# Patient Record
Sex: FEMALE | Race: WHITE | Employment: UNEMPLOYED | URBAN - METROPOLITAN AREA
[De-identification: names, ages, dates, MRNs, and addresses within clinical notes are randomized per-mention and may not be internally consistent; named-entity substitution may affect disease eponyms.]

---

## 2018-02-24 ENCOUNTER — HOSPITAL ENCOUNTER (EMERGENCY)
Age: 63
Discharge: HOME | End: 2018-02-24
Attending: EMERGENCY MEDICINE
Payer: MEDICARE

## 2018-02-24 VITALS
TEMPERATURE: 98.2 F | RESPIRATION RATE: 16 BRPM | WEIGHT: 155.87 LBS | HEART RATE: 66 BPM | HEIGHT: 55 IN | BODY MASS INDEX: 36.07 KG/M2 | DIASTOLIC BLOOD PRESSURE: 67 MMHG | OXYGEN SATURATION: 98 % | SYSTOLIC BLOOD PRESSURE: 128 MMHG

## 2018-02-24 DIAGNOSIS — K64.9 HEMORRHOIDS, UNSPECIFIED HEMORRHOID TYPE: ICD-10-CM

## 2018-02-24 DIAGNOSIS — K62.5 RECTAL BLEEDING: Primary | ICD-10-CM

## 2018-02-24 LAB
ALBUMIN SERPL-MCNC: 3.3 G/DL (ref 3.5–5)
ALBUMIN/GLOB SERPL: 0.7 {RATIO} (ref 1.1–2.2)
ALP SERPL-CCNC: 93 U/L (ref 45–117)
ALT SERPL-CCNC: 25 U/L (ref 12–78)
ANION GAP SERPL CALC-SCNC: 6 MMOL/L (ref 5–15)
APPEARANCE UR: ABNORMAL
AST SERPL-CCNC: 51 U/L (ref 15–37)
BACTERIA URNS QL MICRO: ABNORMAL /HPF
BASOPHILS # BLD: 0 K/UL (ref 0–0.1)
BASOPHILS NFR BLD: 0 % (ref 0–1)
BILIRUB SERPL-MCNC: 0.6 MG/DL (ref 0.2–1)
BILIRUB UR QL: NEGATIVE
BUN SERPL-MCNC: 18 MG/DL (ref 6–20)
BUN/CREAT SERPL: 22 (ref 12–20)
CALCIUM SERPL-MCNC: 8.6 MG/DL (ref 8.5–10.1)
CHLORIDE SERPL-SCNC: 101 MMOL/L (ref 97–108)
CO2 SERPL-SCNC: 28 MMOL/L (ref 21–32)
COLOR UR: ABNORMAL
CREAT SERPL-MCNC: 0.82 MG/DL (ref 0.55–1.02)
DIFFERENTIAL METHOD BLD: ABNORMAL
EOSINOPHIL # BLD: 0 K/UL (ref 0–0.4)
EOSINOPHIL NFR BLD: 0 % (ref 0–7)
EPITH CASTS URNS QL MICRO: ABNORMAL /LPF
ERYTHROCYTE [DISTWIDTH] IN BLOOD BY AUTOMATED COUNT: 14 % (ref 11.5–14.5)
GLOBULIN SER CALC-MCNC: 4.8 G/DL (ref 2–4)
GLUCOSE SERPL-MCNC: 93 MG/DL (ref 65–100)
GLUCOSE UR STRIP.AUTO-MCNC: NEGATIVE MG/DL
HCT VFR BLD AUTO: 51.8 % (ref 35–47)
HGB BLD-MCNC: 17.1 G/DL (ref 11.5–16)
HGB UR QL STRIP: ABNORMAL
IMM GRANULOCYTES # BLD: 0 K/UL (ref 0–0.04)
IMM GRANULOCYTES NFR BLD AUTO: 0 % (ref 0–0.5)
KETONES UR QL STRIP.AUTO: ABNORMAL MG/DL
LEUKOCYTE ESTERASE UR QL STRIP.AUTO: ABNORMAL
LYMPHOCYTES # BLD: 0.4 K/UL (ref 0.8–3.5)
LYMPHOCYTES NFR BLD: 4 % (ref 12–49)
MCH RBC QN AUTO: 31 PG (ref 26–34)
MCHC RBC AUTO-ENTMCNC: 33 G/DL (ref 30–36.5)
MCV RBC AUTO: 94 FL (ref 80–99)
MONOCYTES # BLD: 0.4 K/UL (ref 0–1)
MONOCYTES NFR BLD: 4 % (ref 5–13)
NEUTS SEG # BLD: 9.1 K/UL (ref 1.8–8)
NEUTS SEG NFR BLD: 92 % (ref 32–75)
NITRITE UR QL STRIP.AUTO: NEGATIVE
NRBC # BLD: 0 K/UL (ref 0–0.01)
NRBC BLD-RTO: 0 PER 100 WBC
PH UR STRIP: 5 [PH] (ref 5–8)
PLATELET # BLD AUTO: 280 K/UL (ref 150–400)
PMV BLD AUTO: 10 FL (ref 8.9–12.9)
POTASSIUM SERPL-SCNC: 4.8 MMOL/L (ref 3.5–5.1)
PROT SERPL-MCNC: 8.1 G/DL (ref 6.4–8.2)
PROT UR STRIP-MCNC: NEGATIVE MG/DL
RBC # BLD AUTO: 5.51 M/UL (ref 3.8–5.2)
RBC #/AREA URNS HPF: ABNORMAL /HPF (ref 0–5)
RBC MORPH BLD: ABNORMAL
SODIUM SERPL-SCNC: 135 MMOL/L (ref 136–145)
SP GR UR REFRACTOMETRY: 1.02 (ref 1–1.03)
UA: UC IF INDICATED,UAUC: ABNORMAL
UROBILINOGEN UR QL STRIP.AUTO: 0.2 EU/DL (ref 0.2–1)
WBC # BLD AUTO: 9.9 K/UL (ref 3.6–11)
WBC URNS QL MICRO: ABNORMAL /HPF (ref 0–4)

## 2018-02-24 PROCEDURE — 81001 URINALYSIS AUTO W/SCOPE: CPT | Performed by: EMERGENCY MEDICINE

## 2018-02-24 PROCEDURE — 80053 COMPREHEN METABOLIC PANEL: CPT | Performed by: EMERGENCY MEDICINE

## 2018-02-24 PROCEDURE — 36415 COLL VENOUS BLD VENIPUNCTURE: CPT | Performed by: EMERGENCY MEDICINE

## 2018-02-24 PROCEDURE — 85025 COMPLETE CBC W/AUTO DIFF WBC: CPT | Performed by: EMERGENCY MEDICINE

## 2018-02-24 PROCEDURE — 87086 URINE CULTURE/COLONY COUNT: CPT | Performed by: EMERGENCY MEDICINE

## 2018-02-24 PROCEDURE — 99283 EMERGENCY DEPT VISIT LOW MDM: CPT

## 2018-02-24 RX ORDER — LEVOTHYROXINE SODIUM 75 UG/1
75 TABLET ORAL
COMMUNITY

## 2018-02-24 RX ORDER — GUAIFENESIN 100 MG/5ML
81 LIQUID (ML) ORAL DAILY
COMMUNITY

## 2018-02-24 RX ORDER — DOXYCYCLINE 100 MG/1
100 CAPSULE ORAL 2 TIMES DAILY
COMMUNITY
End: 2018-04-28

## 2018-02-24 NOTE — ED PROVIDER NOTES
EMERGENCY DEPARTMENT HISTORY AND PHYSICAL EXAM      Date: 2/24/2018  Patient Name: Ruslan Moore    History of Presenting Illness     Chief Complaint   Patient presents with    Abdominal Pain     Pain across low abd since today with BRB per rectum. Some diarrhea per family. Started on AB yesterday for abscess       History Provided By: Patient's Brother    HPI: Ruslan Moore, 60 y.o. female with PMHx significant for down syndrome and hypothyroidism, presents via wheelchair to the ED for further evaluation of an acute onset of bright red blood rectal bleeding with noticed clots since this morning. The pt's brother reports associated sx of diffuse lower abdominal pain as well. He expresses that the pt lives with her mother in independent living and is unable to specify her discomfort but upon having a BM this morning noticed a moderate amount of BRB per rectum. He discloses that the pt has had several more episodes as the day progressed leading him to bring the pt to the ED. Per brother the pt strains to have BMs but is unsure if she had any internal/external hemorrhoids. The brother notes the pt is currently on Doxycycline for an abscess to the right foot but denies any other complications. The brother denies the pt has had any fevers, chills, chest pain, SOB, nausea, vomiting, or diarrhea. PCP: Betty Cho MD    There are no other complaints, changes, or physical findings at this time. Past History     Past Medical History:  No past medical history on file. Past Surgical History:  No past surgical history on file. Family History:  No family history on file. Social History:  Social History   Substance Use Topics    Smoking status: Not on file    Smokeless tobacco: Not on file    Alcohol use Not on file       Allergies:  No Known Allergies      Review of Systems   Review of Systems   Constitutional: Negative for chills, fatigue and fever. HENT: Negative. Eyes: Negative. Respiratory: Negative for shortness of breath and wheezing. Cardiovascular: Negative for chest pain and leg swelling. Gastrointestinal: Positive for abdominal pain (diffuse lower ) and anal bleeding (BRB ). Negative for blood in stool, constipation, diarrhea, nausea and vomiting. Endocrine: Negative. Genitourinary: Negative for difficulty urinating and dysuria. Musculoskeletal: Negative. Skin: Positive for wound (abscess right foot ). Negative for rash. Allergic/Immunologic: Negative. Neurological: Negative for weakness and numbness. Hematological: Negative. Psychiatric/Behavioral: Negative. Physical Exam   Physical Exam   Constitutional: She is oriented to person, place, and time. She appears well-developed and well-nourished. No distress. HENT:   Head: Normocephalic and atraumatic. Mouth/Throat: Oropharynx is clear and moist.   Eyes: Conjunctivae and EOM are normal.   Neck: Neck supple. No JVD present. No tracheal deviation present. Cardiovascular: Normal rate, regular rhythm and intact distal pulses. Exam reveals no gallop and no friction rub. No murmur heard. Pulmonary/Chest: Effort normal and breath sounds normal. No stridor. No respiratory distress. She has no wheezes. Abdominal: Soft. Bowel sounds are normal. She exhibits no distension and no mass. There is no tenderness. There is no guarding. Musculoskeletal: Normal range of motion. She exhibits no edema or tenderness. No deformity   Neurological: She is alert and oriented to person, place, and time. She has normal strength. No focal deficits   Skin: Skin is warm, dry and intact. No rash noted. Psychiatric: She has a normal mood and affect. Her behavior is normal. Judgment and thought content normal.   Nursing note and vitals reviewed.         Diagnostic Study Results     Labs -     Recent Results (from the past 12 hour(s))   CBC WITH AUTOMATED DIFF    Collection Time: 02/24/18  4:51 PM   Result Value Ref Range    WBC 9.9 3.6 - 11.0 K/uL    RBC 5.51 (H) 3.80 - 5.20 M/uL    HGB 17.1 (H) 11.5 - 16.0 g/dL    HCT 51.8 (H) 35.0 - 47.0 %    MCV 94.0 80.0 - 99.0 FL    MCH 31.0 26.0 - 34.0 PG    MCHC 33.0 30.0 - 36.5 g/dL    RDW 14.0 11.5 - 14.5 %    PLATELET 158 738 - 080 K/uL    MPV 10.0 8.9 - 12.9 FL    NRBC 0.0 0  WBC    ABSOLUTE NRBC 0.00 0.00 - 0.01 K/uL    NEUTROPHILS 92 (H) 32 - 75 %    LYMPHOCYTES 4 (L) 12 - 49 %    MONOCYTES 4 (L) 5 - 13 %    EOSINOPHILS 0 0 - 7 %    BASOPHILS 0 0 - 1 %    IMMATURE GRANULOCYTES 0 0.0 - 0.5 %    ABS. NEUTROPHILS 9.1 (H) 1.8 - 8.0 K/UL    ABS. LYMPHOCYTES 0.4 (L) 0.8 - 3.5 K/UL    ABS. MONOCYTES 0.4 0.0 - 1.0 K/UL    ABS. EOSINOPHILS 0.0 0.0 - 0.4 K/UL    ABS. BASOPHILS 0.0 0.0 - 0.1 K/UL    ABS. IMM. GRANS. 0.0 0.00 - 0.04 K/UL    DF SMEAR SCANNED      RBC COMMENTS NORMOCYTIC, NORMOCHROMIC     METABOLIC PANEL, COMPREHENSIVE    Collection Time: 02/24/18  4:51 PM   Result Value Ref Range    Sodium 135 (L) 136 - 145 mmol/L    Potassium 4.8 3.5 - 5.1 mmol/L    Chloride 101 97 - 108 mmol/L    CO2 28 21 - 32 mmol/L    Anion gap 6 5 - 15 mmol/L    Glucose 93 65 - 100 mg/dL    BUN 18 6 - 20 MG/DL    Creatinine 0.82 0.55 - 1.02 MG/DL    BUN/Creatinine ratio 22 (H) 12 - 20      GFR est AA >60 >60 ml/min/1.73m2    GFR est non-AA >60 >60 ml/min/1.73m2    Calcium 8.6 8.5 - 10.1 MG/DL    Bilirubin, total 0.6 0.2 - 1.0 MG/DL    ALT (SGPT) 25 12 - 78 U/L    AST (SGOT) 51 (H) 15 - 37 U/L    Alk.  phosphatase 93 45 - 117 U/L    Protein, total 8.1 6.4 - 8.2 g/dL    Albumin 3.3 (L) 3.5 - 5.0 g/dL    Globulin 4.8 (H) 2.0 - 4.0 g/dL    A-G Ratio 0.7 (L) 1.1 - 2.2     URINALYSIS W/ REFLEX CULTURE    Collection Time: 02/24/18  4:51 PM   Result Value Ref Range    Color YELLOW/STRAW      Appearance CLOUDY (A) CLEAR      Specific gravity 1.021 1.003 - 1.030      pH (UA) 5.0 5.0 - 8.0      Protein NEGATIVE  NEG mg/dL    Glucose NEGATIVE  NEG mg/dL    Ketone TRACE (A) NEG mg/dL    Bilirubin NEGATIVE  NEG Blood LARGE (A) NEG      Urobilinogen 0.2 0.2 - 1.0 EU/dL    Nitrites NEGATIVE  NEG      Leukocyte Esterase MODERATE (A) NEG      WBC 5-10 0 - 4 /hpf    RBC 0-5 0 - 5 /hpf    Epithelial cells MODERATE (A) FEW /lpf    Bacteria 2+ (A) NEG /hpf    UA:UC IF INDICATED URINE CULTURE ORDERED (A) CNI         Medical Decision Making   I am the first provider for this patient. I reviewed the vital signs, available nursing notes, past medical history, past surgical history, family history and social history. Vital Signs-Reviewed the patient's vital signs. Patient Vitals for the past 12 hrs:   Temp Pulse Resp BP SpO2   02/24/18 1939 - 66 16 128/67 98 %   02/24/18 1635 98.2 °F (36.8 °C) 86 16 131/74 94 %       Pulse Oximetry Analysis - 94% on room air    Cardiac Monitor:   Rate: 86 bpm  Rhythm: Normal Sinus Rhythm      Records Reviewed: Nursing Notes, Old Medical Records, Previous Radiology Studies and Previous Laboratory Studies    Provider Notes (Medical Decision Making): Anal fissure, hemorrhoids, diverticulosis, anemia, AVM, malignancy. ED Course:   Initial assessment performed. The patients presenting problems have been discussed, and they are in agreement with the care plan formulated and outlined with them. I have encouraged them to ask questions as they arise throughout their visit. Progress Notes:    Procedure Note - Rectal Exam:   7:25 PM  Performed by: Lashon Harry DO  Chaperoned by: Easton Alston RN  Rectal exam performed. No stool was collected. Other findings: no active bleeding, no clots visualized, internal hemorrhoids  The procedure took 1-15 minutes, and pt tolerated well.     7:28 PM  The pt has been re-evaluated. The pt's sister-in-law reports that the pt had an episode of diarrhea this morning likely from the abx. Critical Care Time: 0 minutes    Disposition:  Discharge Note:  7:28 PM  The patient is ready for discharge.  The patient's signs, symptoms, diagnosis, and discharge instruction have been discussed and the patient has conveyed their understanding. The patient is to follow up as recommended or return to the ER should their symptoms worsen. Plan has been discussed and the patient is in agreement. Written by Emelyn Mtz ED Scribe, as dictated by Florinda Reis DO    PLAN:  1. Discharge Medication List as of 2/24/2018  7:29 PM        2. Follow-up Information     Follow up With Details Comments Contact Info    Ramsey Connell MD Schedule an appointment as soon as possible for a visit  [address] [place], [place] [id number]  [phone]      \[facility]\"" EMERGENCY DEPT  As needed, If symptoms worsen [address] [place] [address]  [phone]        Return to ED if worse     Diagnosis     Clinical Impression:   1. Rectal bleeding    2. Hemorrhoids, unspecified hemorrhoid type        Attestations:    Attestation: This note is prepared by Amber Mtz, acting as Scribe for Florinda Reis DO. Florinda Reis DO: The scribe's documentation has been prepared under my direction and personally reviewed by me in its entirety. I confirm that the note above accurately reflects all work, treatment, procedures, and medical decision making performed by me.

## 2018-02-24 NOTE — ED NOTES
Assumed care of pt from triage. Per family, pt with onset of bright red rectal bleeding today. Pt also c/o lower abdominal pain. Pt placed on monitor x2. VSS. Pt in position of comfort with call bell within reach. No signs of acute distress noted. Family at bedside.

## 2018-02-25 NOTE — ED NOTES
Uma Barton DO   has done a bedside review of the discharge instructions. The patient is in understanding. The patients line(s) are removed. The patient is dressed, and belongings together for discharge.

## 2018-02-25 NOTE — ED NOTES
Dr. Ginette Schwarz reviewed discharge instructions with the patient. The patient verbalized understanding. All questions and concerns were addressed. The patient is discharged via wheelchair in the care of family members with instructions and prescriptions in hand. Pt is alert and oriented x 4. Respirations are clear and unlabored.

## 2018-02-25 NOTE — DISCHARGE INSTRUCTIONS
Hemorrhoids: Care Instructions  Your Care Instructions    Hemorrhoids are enlarged veins that develop in the anal canal. Bleeding during bowel movements, itching, swelling, and rectal pain are the most common symptoms. They can be uncomfortable at times, but hemorrhoids rarely are a serious problem. You can treat most hemorrhoids with simple changes to your diet and bowel habits. These changes include eating more fiber and not straining to pass stools. Most hemorrhoids do not need surgery or other treatment unless they are very large and painful or bleed a lot. Follow-up care is a key part of your treatment and safety. Be sure to make and go to all appointments, and call your doctor if you are having problems. It's also a good idea to know your test results and keep a list of the medicines you take. How can you care for yourself at home? · Sit in a few inches of warm water (sitz bath) 3 times a day and after bowel movements. The warm water helps with pain and itching. · Put ice on your anal area several times a day for 10 minutes at a time. Put a thin cloth between the ice and your skin. Follow this by placing a warm, wet towel on the area for another 10 to 20 minutes. · Take pain medicines exactly as directed. ¨ If the doctor gave you a prescription medicine for pain, take it as prescribed. ¨ If you are not taking a prescription pain medicine, ask your doctor if you can take an over-the-counter medicine. · Keep the anal area clean, but be gentle. Use water and a fragrance-free soap, such as [place], or use baby wipes or medicated pads, such as Tucks. · Wear cotton underwear and loose clothing to decrease moisture in the anal area. · Eat more fiber. Include foods such as whole-grain breads and cereals, raw vegetables, raw and dried fruits, and beans. · Drink plenty of fluids, enough so that your urine is light yellow or clear like water.  If you have kidney, heart, or liver disease and have to limit fluids, talk with your doctor before you increase the amount of fluids you drink. · Use a stool softener that contains bran or psyllium. You can save money by buying bran or psyllium (available in bulk at most health food stores) and sprinkling it on foods or stirring it into fruit juice. Or you can use a product such as Metamucil or Hydrocil. · Practice healthy bowel habits. ¨ Go to the bathroom as soon as you have the urge. ¨ Avoid straining to pass stools. Relax and give yourself time to let things happen naturally. ¨ Do not hold your breath while passing stools. ¨ Do not read while sitting on the toilet. Get off the toilet as soon as you have finished. · Take your medicines exactly as prescribed. Call your doctor if you think you are having a problem with your medicine. When should you call for help? Call 911 anytime you think you may need emergency care. For example, call if:  ? · You pass maroon or very bloody stools. ?Call your doctor now or seek immediate medical care if:  ? · You have increased pain. ? · You have increased bleeding. ? Watch closely for changes in your health, and be sure to contact your doctor if:  ? · Your symptoms have not improved after 3 or 4 days. Where can you learn more? Go to http://josh-gurmeet.info/. Enter F228 in the search box to learn more about \"Hemorrhoids: Care Instructions. \"  Current as of: May 12, 2017  Content Version: 11.4  © 4731-5465 [employer]. Care instructions adapted under license by [employer] (which disclaims liability or warranty for this information). If you have questions about a medical condition or this instruction, always ask your healthcare professional. [place] [address] any warranty or liability for your use of this information. Rectal Bleeding: Care Instructions  Your Care Instructions    Rectal bleeding in small amounts is common.  You may see red spotting on toilet paper or drops of blood in the toilet. Rectal bleeding has many possible causes, from something as minor as hemorrhoids to something as serious as colon cancer. You may need more tests to find the cause of your bleeding. Follow-up care is a key part of your treatment and safety. Be sure to make and go to all appointments, and call your doctor if you are having problems. It's also a good idea to know your test results and keep a list of the medicines you take. How can you care for yourself at home? · Avoid aspirin and other nonsteroidal anti-inflammatory drugs (NSAIDs), such as ibuprofen (Advil, Motrin) and naproxen (Aleve). They can cause you to bleed more. Ask your doctor if you can take acetaminophen (Tylenol). Read and follow all instructions on the label. · Use a stool softener that contains bran or psyllium. You can save money by buying bran or psyllium (available in bulk at most health food stores) and sprinkling it on foods or stirring it into fruit juice. You can also use a product such as Metamucil or Citrucel. · Take your medicines exactly as directed. Call your doctor if you think you are having a problem with your medicine. When should you call for help? Call 911 anytime you think you may need emergency care. For example, call if:  ? · You passed out (lost consciousness). ?Call your doctor now or seek immediate medical care if:  ? · You have new or worse pain. ? · You have new or worse bleeding from the rectum. ? · You are dizzy or light-headed, or you feel like you may faint. ? Watch closely for changes in your health, and be sure to contact your doctor if:  ? · You cannot pass stools or gas. ? · You do not get better as expected. Where can you learn more? Go to http://josh-gurmeet.info/. Enter G326 in the search box to learn more about \"Rectal Bleeding: Care Instructions. \"  Current as of: May 12, 2017  Content Version: 11.4  © 1022-5793 [employer]. Care instructions adapted under license by [employer] (which disclaims liability or warranty for this information). If you have questions about a medical condition or this instruction, always ask your healthcare professional. [address] any warranty or liability for your use of this information.

## 2018-02-26 LAB
BACTERIA SPEC CULT: NORMAL
CC UR VC: NORMAL
SERVICE CMNT-IMP: NORMAL

## 2018-04-28 ENCOUNTER — OFFICE VISIT (OUTPATIENT)
Dept: URGENT CARE | Age: 63
End: 2018-04-28

## 2018-04-28 VITALS
DIASTOLIC BLOOD PRESSURE: 58 MMHG | OXYGEN SATURATION: 97 % | BODY MASS INDEX: 34.71 KG/M2 | HEIGHT: 55 IN | SYSTOLIC BLOOD PRESSURE: 118 MMHG | HEART RATE: 97 BPM | TEMPERATURE: 97.3 F | WEIGHT: 150 LBS | RESPIRATION RATE: 18 BRPM

## 2018-04-28 DIAGNOSIS — J45.909 MILD REACTIVE AIRWAYS DISEASE, UNSPECIFIED WHETHER PERSISTENT: Primary | ICD-10-CM

## 2018-04-28 RX ORDER — BENZONATATE 200 MG/1
200 CAPSULE ORAL
Qty: 21 CAP | Refills: 0 | Status: SHIPPED | OUTPATIENT
Start: 2018-04-28 | End: 2018-05-05

## 2018-04-28 RX ORDER — LORATADINE 10 MG/1
10 TABLET ORAL DAILY
Qty: 15 TAB | Refills: 0 | Status: SHIPPED | OUTPATIENT
Start: 2018-04-28

## 2018-04-28 RX ORDER — ALBUTEROL SULFATE 90 UG/1
2 AEROSOL, METERED RESPIRATORY (INHALATION)
Qty: 1 INHALER | Refills: 0 | Status: SHIPPED | OUTPATIENT
Start: 2018-04-28 | End: 2020-01-01

## 2018-04-28 RX ORDER — METHYLPREDNISOLONE 4 MG/1
TABLET ORAL
Qty: 1 DOSE PACK | Refills: 0 | Status: SHIPPED | OUTPATIENT
Start: 2018-04-28 | End: 2020-01-01

## 2018-04-28 NOTE — PATIENT INSTRUCTIONS
Reactive Airway Disease: Care Instructions  Your Care Instructions    Reactive airway disease is a breathing problem that appears as wheezing, a whistling noise in your airways. It may be caused by a viral or bacterial infection, allergies, tobacco smoke, or something else in the environment. When you are around these triggers, your body releases chemicals that make the airways get tight. Reactive airway disease is a lot like asthma. Both can cause wheezing. But asthma is ongoing, while reactive airway disease may occur only now and then. Tests can be done to tell whether you have asthma. You may take the same medicines used to treat asthma. Good home care and follow-up care with your doctor can help you recover. Follow-up care is a key part of your treatment and safety. Be sure to make and go to all appointments, and call your doctor if you are having problems. It's also a good idea to know your test results and keep a list of the medicines you take. How can you care for yourself at home? · Take your medicines exactly as prescribed. Call your doctor if you think you are having a problem with your medicine. · Do not smoke or allow others to smoke around you. If you need help quitting, talk to your doctor about stop-smoking programs and medicines. These can increase your chances of quitting for good. · If you know what caused your wheezing (such as perfume or the odor of household chemicals), try to avoid it in the future. · Wash your hands several times a day, and consider using hand gels or wipes that contain alcohol. This can prevent colds and other infections. When should you call for help? Call 911 anytime you think you may need emergency care. For example, call if:  ? · You have severe trouble breathing. ? Watch closely for changes in your health, and be sure to contact your doctor if:  ? · You cough up yellow, dark brown, or bloody mucus. ? · You have a fever. ? · Your wheezing gets worse. Where can you learn more? Go to http://josh-gurmeet.info/. Enter J502 in the search box to learn more about \"Reactive Airway Disease: Care Instructions. \"  Current as of: May 12, 2017  Content Version: 11.4  © 7468-0136 Healthwise, [employer] [place]. Care instructions adapted under license by [employer] (which disclaims liability or warranty for this information). If you have questions about a medical condition or this instruction, always ask your healthcare professional. [address] any warranty or liability for your use of this information.

## 2018-04-28 NOTE — PROGRESS NOTES
Patient is a 60 y.o. female presenting with cold symptoms. The history is provided by a relative and a caregiver (brother). The history is limited by the condition of the patient. Cold Symptoms   The history is provided by the patient. This is a new problem. The current episode started 2 days ago. The problem occurs every few minutes. The problem has not changed since onset. The cough is non-productive. There has been no fever. Associated symptoms include rhinorrhea, sore throat, shortness of breath and wheezing. Pertinent negatives include no chest pain and no chills. She has tried nothing for the symptoms. She is not a smoker. Her past medical history does not include asthma. Past Medical History:   Diagnosis Date    Endocrine disease     hypothyroidism    Psychiatric disorder     Down Syndrome        Past Surgical History:   Procedure Laterality Date    HX CHOLECYSTECTOMY           History reviewed. No pertinent family history. Social History     Social History    Marital status: SINGLE     Spouse name: N/A    Number of children: N/A    Years of education: N/A     Occupational History    Not on file. Social History Main Topics    Smoking status: Never Smoker    Smokeless tobacco: Never Used    Alcohol use No    Drug use: No    Sexual activity: Not on file     Other Topics Concern    Not on file     Social History Narrative                ALLERGIES: Review of patient's allergies indicates no known allergies. Review of Systems   Constitutional: Negative for chills. HENT: Positive for rhinorrhea and sore throat. Respiratory: Positive for shortness of breath and wheezing. Cardiovascular: Negative for chest pain. All other systems reviewed and are negative. Vitals:    04/28/18 1806   BP: 118/58   Pulse: 97   Resp: 18   Temp: 97.3 °F (36.3 °C)   SpO2: 97%   Weight: 150 lb (68 kg)   Height: 4' 5\" (1.346 m)       Physical Exam   Constitutional: No distress.    HENT:   Right Ear: Tympanic membrane and ear canal normal.   Left Ear: Tympanic membrane and ear canal normal.   Nose: Nose normal.   Mouth/Throat: No oropharyngeal exudate, posterior oropharyngeal edema or posterior oropharyngeal erythema. Eyes: Conjunctivae are normal. Right eye exhibits no discharge. Left eye exhibits no discharge. Neck: Neck supple. Pulmonary/Chest: Effort normal. No respiratory distress. She has decreased breath sounds. She has no wheezes. She has rhonchi. She has no rales. Lymphadenopathy:     She has no cervical adenopathy. Skin: No rash noted. Nursing note and vitals reviewed. MDM    Procedures      ICD-10-CM ICD-9-CM    1. Mild reactive airways disease, unspecified whether persistent J45.909 493.90      Medications Ordered Today   Medications    benzonatate (TESSALON) 200 mg capsule     Sig: Take 1 Cap by mouth three (3) times daily as needed for Cough for up to 7 days. Dispense:  21 Cap     Refill:  0    albuterol (PROVENTIL HFA, VENTOLIN HFA, PROAIR HFA) 90 mcg/actuation inhaler     Sig: Take 2 Puffs by inhalation every six (6) hours as needed for Wheezing. Dispense:  1 Inhaler     Refill:  0    methylPREDNISolone (MEDROL, KHANH,) 4 mg tablet     Sig: As directed     Dispense:  1 Dose Pack     Refill:  0    loratadine (CLARITIN) 10 mg tablet     Sig: Take 1 Tab by mouth daily. Dispense:  15 Tab     Refill:  0     No results found for any visits on 04/28/18. The patients condition was discussed with the patient and they understand. The patient is to follow up with primary care doctor. If signs and symptoms become worse the pt is to go to the ER. The patient is to take medications as prescribed.

## 2018-04-28 NOTE — MR AVS SNAPSHOT
[address] [address] 
[phone] Patient: Lisandro Munoz MRN: [id number] ILL:[id number] Visit Information Date & Time Provider Department Dept. Phone Encounter #  
 4/28/2018  6:00 PM [facility] [phone] [id number] Follow-up Instructions Return if symptoms worsen or fail to improve, for Follow up with PCP. Upcoming Health Maintenance Date Due Hepatitis C Screening 1955 DTaP/Tdap/Td series (1 - Tdap) 12/4/1975 PAP AKA CERVICAL CYTOLOGY 12/4/1975 FOBT Q 1 YEAR AGE 50-75 12/4/2004 ZOSTER VACCINE AGE 60> 10/4/2014 BREAST CANCER SCRN MAMMOGRAM 4/10/2015 MEDICARE YEARLY EXAM 3/20/2018 Influenza Age 5 to Adult 8/1/2018 Allergies as of 4/28/2018  Review Complete On: 4/28/2018 By: Chantale Glover RN No Known Allergies Current Immunizations  Never Reviewed No immunizations on file. Not reviewed this visit You Were Diagnosed With   
  
 Codes Comments Mild reactive airways disease, unspecified whether persistent    -  Primary ICD-10-CM: J45.909 ICD-9-CM: 493.90 Vitals BP Pulse Temp Resp Height(growth percentile) Weight(growth percentile) 118/58 97 97.3 °F (36.3 °C) 18 4' 5\" (1.346 m) 150 lb (68 kg) SpO2 BMI OB Status Smoking Status 97% 37.54 kg/m2 Postmenopausal Never Smoker BMI and BSA Data Body Mass Index Body Surface Area  
 37.54 kg/m 2 1.59 m 2 Your Updated Medication List  
  
   
This list is accurate as of 4/28/18  6:52 PM.  Always use your most recent med list.  
  
  
  
  
 albuterol 90 mcg/actuation inhaler Commonly known as:  PROVENTIL HFA, VENTOLIN HFA, PROAIR HFA Take 2 Puffs by inhalation every six (6) hours as needed for Wheezing. aspirin 81 mg chewable tablet Take 81 mg by mouth daily. benzonatate 200 mg capsule Commonly known as:  TESSALON  
 Take 1 Cap by mouth three (3) times daily as needed for Cough for up to 7 days. loratadine 10 mg tablet Commonly known as:  Blima [place] Take 1 Tab by mouth daily. methylPREDNISolone 4 mg tablet Commonly known as:  MEDROL (KHANH) As directed  
  
 synthroid 50 mcg tablet Generic drug:  levothyroxine Take 50 mcg by mouth Daily (before breakfast). Prescriptions Printed Refills  
 benzonatate (TESSALON) 200 mg capsule 0 Sig: Take 1 Cap by mouth three (3) times daily as needed for Cough for up to 7 days. Class: Print Route: Oral  
 albuterol (PROVENTIL HFA, VENTOLIN HFA, PROAIR HFA) 90 mcg/actuation inhaler 0 Sig: Take 2 Puffs by inhalation every six (6) hours as needed for Wheezing. Class: Print Route: Inhalation  
 methylPREDNISolone (MEDROL, KHANH,) 4 mg tablet 0 Sig: As directed Class: Print  
 loratadine (CLARITIN) 10 mg tablet 0 Sig: Take 1 Tab by mouth daily. Class: Print Route: Oral  
  
Follow-up Instructions Return if symptoms worsen or fail to improve, for Follow up with PCP. Patient Instructions Reactive Airway Disease: Care Instructions Your Care Instructions Reactive airway disease is a breathing problem that appears as wheezing, a whistling noise in your airways. It may be caused by a viral or bacterial infection, allergies, tobacco smoke, or something else in the environment. When you are around these triggers, your body releases chemicals that make the airways get tight. Reactive airway disease is a lot like asthma. Both can cause wheezing. But asthma is ongoing, while reactive airway disease may occur only now and then. Tests can be done to tell whether you have asthma. You may take the same medicines used to treat asthma. Good home care and follow-up care with your doctor can help you recover. Follow-up care is a key part of your treatment and safety.  Be sure to make and go to all appointments, and call your doctor if you are having problems. It's also a good idea to know your test results and keep a list of the medicines you take. How can you care for yourself at home? · Take your medicines exactly as prescribed. Call your doctor if you think you are having a problem with your medicine. · Do not smoke or allow others to smoke around you. If you need help quitting, talk to your doctor about stop-smoking programs and medicines. These can increase your chances of quitting for good. · If you know what caused your wheezing (such as perfume or the odor of household chemicals), try to avoid it in the future. · Wash your hands several times a day, and consider using hand gels or wipes that contain alcohol. This can prevent colds and other infections. When should you call for help? Call 911 anytime you think you may need emergency care. For example, call if: 
? · You have severe trouble breathing. ? Watch closely for changes in your health, and be sure to contact your doctor if: 
? · You cough up yellow, dark brown, or bloody mucus. ? · You have a fever. ? · Your wheezing gets worse. Where can you learn more? Go to http://josh-gurmeet.info/. Enter R373 in the search box to learn more about \"Reactive Airway Disease: Care Instructions. \" Current as of: May 12, 2017 Content Version: 11.4 © 1449-9716 [employer]. Care instructions adapted under license by [employer] (which disclaims liability or warranty for this information). If you have questions about a medical condition or this instruction, always ask your healthcare professional. [address] any warranty or liability for your use of this information. Introducing [facility]! Carolyn Talbot introduces [employer] patient portal. Now you can access parts of your medical record, email your doctor's office, and request medication refills online. 1. In your internet browser, go to [email or web address] [employer] 2. Click on the First Time User? Click Here link in the Sign In box. You will see the New Member Sign Up page. 3. Enter your [employer] Access Code exactly as it appears below. You will not need to use this code after youve completed the sign-up process. If you do not sign up before the expiration date, you must request a new code. · [employer] Access Code: [email or web address] Expires: 5/25/2018  8:29 PM 
 
4. Enter the last four digits of your Social Security Number (xxxx) and Date of Birth (mm/dd/yyyy) as indicated and click Submit. You will be taken to the next sign-up page. 5. Create a App[employer] ID. This will be your [employer] login ID and cannot be changed, so think of one that is secure and easy to remember. 6. Create a [employer] password. You can change your password at any time. 7. Enter your Password Reset Question and Answer. This can be used at a later time if you forget your password. 8. Enter your e-mail address. You will receive e-mail notification when new information is available in [address] 9. Click Sign Up. You can now view and download portions of your medical record. 10. Click the Download Summary menu link to download a portable copy of your medical information. If you have questions, please visit the Frequently Asked Questions section of the [employer] website. Remember, [employer] is NOT to be used for urgent needs. For medical emergencies, dial 911. Now available from your iPhone and Android! Please provide this summary of care documentation to your next provider. Your primary care clinician is listed as Joseph Lord. If you have any questions after today's visit, please call [phone].

## 2018-11-20 ENCOUNTER — HOSPITAL ENCOUNTER (OUTPATIENT)
Dept: CT IMAGING | Age: 63
Discharge: HOME | End: 2018-11-20
Attending: GENERAL PRACTICE
Payer: MEDICARE

## 2018-11-20 DIAGNOSIS — R41.0 DISORIENTATION, UNSPECIFIED: ICD-10-CM

## 2018-11-20 PROCEDURE — 70470 CT HEAD/BRAIN W/O & W/DYE: CPT

## 2018-11-20 PROCEDURE — 74011636320 HC RX REV CODE- 636/320: Performed by: RADIOLOGY

## 2018-11-20 PROCEDURE — 74011000258 HC RX REV CODE- 258: Performed by: RADIOLOGY

## 2018-11-20 RX ORDER — SODIUM CHLORIDE 0.9 % (FLUSH) 0.9 %
10 SYRINGE (ML) INJECTION
Status: COMPLETED | OUTPATIENT
Start: 2018-11-20 | End: 2018-11-20

## 2018-11-20 RX ADMIN — SODIUM CHLORIDE 100 ML: 900 INJECTION, SOLUTION INTRAVENOUS at 10:40

## 2018-11-20 RX ADMIN — IOPAMIDOL 100 ML: 612 INJECTION, SOLUTION INTRAVENOUS at 10:40

## 2018-11-20 RX ADMIN — Medication 10 ML: at 10:40

## 2020-01-01 ENCOUNTER — TELEPHONE (OUTPATIENT)
Dept: HOSPICE | Facility: HOSPICE | Age: 65
End: 2020-01-01

## 2020-01-01 ENCOUNTER — APPOINTMENT (OUTPATIENT)
Dept: CT IMAGING | Age: 65
DRG: 025 | End: 2020-01-01
Attending: SPECIALIST
Payer: MEDICARE

## 2020-01-01 ENCOUNTER — HOSPITAL ENCOUNTER (INPATIENT)
Age: 65
LOS: 3 days | End: 2020-01-19
Attending: INTERNAL MEDICINE | Admitting: FAMILY MEDICINE

## 2020-01-01 ENCOUNTER — APPOINTMENT (OUTPATIENT)
Dept: CT IMAGING | Age: 65
End: 2020-01-01
Attending: EMERGENCY MEDICINE
Payer: MEDICARE

## 2020-01-01 ENCOUNTER — HOSPITAL ENCOUNTER (INPATIENT)
Age: 65
LOS: 1 days | Discharge: HOSPICE | DRG: 951 | End: 2020-01-16
Attending: FAMILY MEDICINE | Admitting: FAMILY MEDICINE
Payer: OTHER MISCELLANEOUS

## 2020-01-01 ENCOUNTER — APPOINTMENT (OUTPATIENT)
Dept: CT IMAGING | Age: 65
DRG: 025 | End: 2020-01-01
Attending: PSYCHIATRY & NEUROLOGY
Payer: MEDICARE

## 2020-01-01 ENCOUNTER — APPOINTMENT (OUTPATIENT)
Dept: GENERAL RADIOLOGY | Age: 65
DRG: 025 | End: 2020-01-01
Attending: INTERNAL MEDICINE
Payer: MEDICARE

## 2020-01-01 ENCOUNTER — ANESTHESIA (OUTPATIENT)
Dept: SURGERY | Age: 65
DRG: 025 | End: 2020-01-01
Payer: MEDICARE

## 2020-01-01 ENCOUNTER — HOSPICE ADMISSION (OUTPATIENT)
Dept: HOSPICE | Facility: HOSPICE | Age: 65
End: 2020-01-01
Payer: MEDICARE

## 2020-01-01 ENCOUNTER — ANESTHESIA EVENT (OUTPATIENT)
Dept: SURGERY | Age: 65
DRG: 025 | End: 2020-01-01
Payer: MEDICARE

## 2020-01-01 ENCOUNTER — APPOINTMENT (OUTPATIENT)
Dept: CT IMAGING | Age: 65
DRG: 025 | End: 2020-01-01
Attending: NEUROLOGICAL SURGERY
Payer: MEDICARE

## 2020-01-01 ENCOUNTER — HOSPITAL ENCOUNTER (INPATIENT)
Age: 65
LOS: 13 days | Discharge: HOSPICE | DRG: 025 | End: 2020-01-15
Attending: SPECIALIST | Admitting: INTERNAL MEDICINE
Payer: MEDICARE

## 2020-01-01 ENCOUNTER — HOSPITAL ENCOUNTER (EMERGENCY)
Age: 65
Discharge: ANOTHER HOSPITAL | End: 2020-01-02
Attending: EMERGENCY MEDICINE
Payer: MEDICARE

## 2020-01-01 VITALS
TEMPERATURE: 97.4 F | HEART RATE: 67 BPM | OXYGEN SATURATION: 100 % | RESPIRATION RATE: 18 BRPM | WEIGHT: 123.9 LBS | BODY MASS INDEX: 28.67 KG/M2 | DIASTOLIC BLOOD PRESSURE: 40 MMHG | SYSTOLIC BLOOD PRESSURE: 103 MMHG | HEIGHT: 55 IN

## 2020-01-01 VITALS
SYSTOLIC BLOOD PRESSURE: 80 MMHG | DIASTOLIC BLOOD PRESSURE: 40 MMHG | TEMPERATURE: 99 F | RESPIRATION RATE: 16 BRPM | OXYGEN SATURATION: 89 % | HEART RATE: 91 BPM

## 2020-01-01 VITALS
BODY MASS INDEX: 33.67 KG/M2 | RESPIRATION RATE: 15 BRPM | HEART RATE: 77 BPM | SYSTOLIC BLOOD PRESSURE: 120 MMHG | DIASTOLIC BLOOD PRESSURE: 90 MMHG | HEIGHT: 55 IN | WEIGHT: 145.5 LBS | OXYGEN SATURATION: 95 % | TEMPERATURE: 98.7 F

## 2020-01-01 VITALS
RESPIRATION RATE: 12 BRPM | WEIGHT: 136.47 LBS | BODY MASS INDEX: 34.16 KG/M2 | TEMPERATURE: 96.5 F | OXYGEN SATURATION: 90 % | DIASTOLIC BLOOD PRESSURE: 49 MMHG | SYSTOLIC BLOOD PRESSURE: 101 MMHG | HEART RATE: 68 BPM

## 2020-01-01 DIAGNOSIS — R45.1 RESTLESSNESS AND AGITATION: ICD-10-CM

## 2020-01-01 DIAGNOSIS — S06.5XAA SDH (SUBDURAL HEMATOMA): ICD-10-CM

## 2020-01-01 DIAGNOSIS — R06.81 APNEA: ICD-10-CM

## 2020-01-01 DIAGNOSIS — R52 DIFFUSE PAIN: ICD-10-CM

## 2020-01-01 DIAGNOSIS — R56.9 SEIZURE-LIKE ACTIVITY (HCC): ICD-10-CM

## 2020-01-01 DIAGNOSIS — R45.1 RESTLESSNESS: ICD-10-CM

## 2020-01-01 DIAGNOSIS — S06.5XAA SUBDURAL HEMATOMA: Primary | ICD-10-CM

## 2020-01-01 DIAGNOSIS — K11.7 INCREASED OROPHARYNGEAL SECRETIONS: ICD-10-CM

## 2020-01-01 DIAGNOSIS — R06.4 LABORED BREATHING: ICD-10-CM

## 2020-01-01 DIAGNOSIS — G40.219 LOCALIZATION-RELATED (FOCAL) (PARTIAL) SYMPTOMATIC EPILEPSY AND EPILEPTIC SYNDROMES WITH COMPLEX PARTIAL SEIZURES, INTRACTABLE, WITHOUT STATUS EPILEPTICUS (HCC): ICD-10-CM

## 2020-01-01 DIAGNOSIS — Z91.81 AT HIGH RISK FOR INJURY RELATED TO FALL: ICD-10-CM

## 2020-01-01 DIAGNOSIS — R45.1 AGITATION: ICD-10-CM

## 2020-01-01 DIAGNOSIS — R41.89 DECREASED RESPONSIVENESS: ICD-10-CM

## 2020-01-01 DIAGNOSIS — R56.9 SEIZURES (HCC): ICD-10-CM

## 2020-01-01 DIAGNOSIS — Q90.9 DOWN'S SYNDROME: Chronic | ICD-10-CM

## 2020-01-01 DIAGNOSIS — R52 GENERALIZED PAIN: ICD-10-CM

## 2020-01-01 DIAGNOSIS — R63.30 FEEDING DIFFICULTIES: ICD-10-CM

## 2020-01-01 DIAGNOSIS — Z91.89 AT HIGH RISK FOR ASPIRATION: ICD-10-CM

## 2020-01-01 DIAGNOSIS — Z51.5 COMFORT MEASURES ONLY STATUS: ICD-10-CM

## 2020-01-01 LAB
ALBUMIN SERPL-MCNC: 2.5 G/DL (ref 3.5–5)
ALBUMIN SERPL-MCNC: 2.7 G/DL (ref 3.5–5)
ALBUMIN SERPL-MCNC: 2.9 G/DL (ref 3.5–5)
ALBUMIN/GLOB SERPL: 0.6 {RATIO} (ref 1.1–2.2)
ALBUMIN/GLOB SERPL: 0.7 {RATIO} (ref 1.1–2.2)
ALBUMIN/GLOB SERPL: 0.7 {RATIO} (ref 1.1–2.2)
ALP SERPL-CCNC: 104 U/L (ref 45–117)
ALP SERPL-CCNC: 83 U/L (ref 45–117)
ALP SERPL-CCNC: 91 U/L (ref 45–117)
ALT SERPL-CCNC: 14 U/L (ref 12–78)
ALT SERPL-CCNC: 25 U/L (ref 12–78)
ALT SERPL-CCNC: 27 U/L (ref 12–78)
ANION GAP SERPL CALC-SCNC: 2 MMOL/L (ref 5–15)
ANION GAP SERPL CALC-SCNC: 2 MMOL/L (ref 5–15)
ANION GAP SERPL CALC-SCNC: 3 MMOL/L (ref 5–15)
ANION GAP SERPL CALC-SCNC: 3 MMOL/L (ref 5–15)
ANION GAP SERPL CALC-SCNC: 4 MMOL/L (ref 5–15)
ANION GAP SERPL CALC-SCNC: 5 MMOL/L (ref 5–15)
ANION GAP SERPL CALC-SCNC: 5 MMOL/L (ref 5–15)
ANION GAP SERPL CALC-SCNC: 6 MMOL/L (ref 5–15)
ANION GAP SERPL CALC-SCNC: 6 MMOL/L (ref 5–15)
ANION GAP SERPL CALC-SCNC: 7 MMOL/L (ref 5–15)
ANION GAP SERPL CALC-SCNC: 8 MMOL/L (ref 5–15)
APPEARANCE UR: CLEAR
APTT PPP: 23.6 SEC (ref 22.1–32)
AST SERPL-CCNC: 21 U/L (ref 15–37)
AST SERPL-CCNC: 26 U/L (ref 15–37)
AST SERPL-CCNC: 36 U/L (ref 15–37)
BACTERIA URNS QL MICRO: NEGATIVE /HPF
BASOPHILS # BLD: 0 K/UL (ref 0–0.1)
BASOPHILS # BLD: 0.1 K/UL (ref 0–0.1)
BASOPHILS NFR BLD: 0 % (ref 0–1)
BASOPHILS NFR BLD: 1 % (ref 0–1)
BASOPHILS NFR BLD: 2 % (ref 0–1)
BILIRUB SERPL-MCNC: 0.6 MG/DL (ref 0.2–1)
BILIRUB UR QL: NEGATIVE
BUN SERPL-MCNC: 10 MG/DL (ref 6–20)
BUN SERPL-MCNC: 11 MG/DL (ref 6–20)
BUN SERPL-MCNC: 12 MG/DL (ref 6–20)
BUN SERPL-MCNC: 12 MG/DL (ref 6–20)
BUN SERPL-MCNC: 13 MG/DL (ref 6–20)
BUN SERPL-MCNC: 14 MG/DL (ref 6–20)
BUN SERPL-MCNC: 15 MG/DL (ref 6–20)
BUN SERPL-MCNC: 16 MG/DL (ref 6–20)
BUN SERPL-MCNC: 18 MG/DL (ref 6–20)
BUN SERPL-MCNC: 6 MG/DL (ref 6–20)
BUN SERPL-MCNC: 7 MG/DL (ref 6–20)
BUN SERPL-MCNC: 7 MG/DL (ref 6–20)
BUN SERPL-MCNC: 8 MG/DL (ref 6–20)
BUN SERPL-MCNC: 9 MG/DL (ref 6–20)
BUN/CREAT SERPL: 11 (ref 12–20)
BUN/CREAT SERPL: 12 (ref 12–20)
BUN/CREAT SERPL: 14 (ref 12–20)
BUN/CREAT SERPL: 16 (ref 12–20)
BUN/CREAT SERPL: 17 (ref 12–20)
BUN/CREAT SERPL: 17 (ref 12–20)
BUN/CREAT SERPL: 20 (ref 12–20)
BUN/CREAT SERPL: 20 (ref 12–20)
BUN/CREAT SERPL: 23 (ref 12–20)
BUN/CREAT SERPL: 24 (ref 12–20)
BUN/CREAT SERPL: 25 (ref 12–20)
CALCIUM SERPL-MCNC: 7.4 MG/DL (ref 8.5–10.1)
CALCIUM SERPL-MCNC: 7.9 MG/DL (ref 8.5–10.1)
CALCIUM SERPL-MCNC: 8 MG/DL (ref 8.5–10.1)
CALCIUM SERPL-MCNC: 8 MG/DL (ref 8.5–10.1)
CALCIUM SERPL-MCNC: 8.1 MG/DL (ref 8.5–10.1)
CALCIUM SERPL-MCNC: 8.2 MG/DL (ref 8.5–10.1)
CALCIUM SERPL-MCNC: 8.3 MG/DL (ref 8.5–10.1)
CALCIUM SERPL-MCNC: 8.3 MG/DL (ref 8.5–10.1)
CALCIUM SERPL-MCNC: 8.5 MG/DL (ref 8.5–10.1)
CALCIUM SERPL-MCNC: 8.8 MG/DL (ref 8.5–10.1)
CALCIUM SERPL-MCNC: 8.9 MG/DL (ref 8.5–10.1)
CHLORIDE SERPL-SCNC: 100 MMOL/L (ref 97–108)
CHLORIDE SERPL-SCNC: 100 MMOL/L (ref 97–108)
CHLORIDE SERPL-SCNC: 101 MMOL/L (ref 97–108)
CHLORIDE SERPL-SCNC: 102 MMOL/L (ref 97–108)
CHLORIDE SERPL-SCNC: 103 MMOL/L (ref 97–108)
CHLORIDE SERPL-SCNC: 103 MMOL/L (ref 97–108)
CHLORIDE SERPL-SCNC: 104 MMOL/L (ref 97–108)
CHLORIDE SERPL-SCNC: 104 MMOL/L (ref 97–108)
CHLORIDE SERPL-SCNC: 105 MMOL/L (ref 97–108)
CHLORIDE SERPL-SCNC: 105 MMOL/L (ref 97–108)
CHLORIDE SERPL-SCNC: 106 MMOL/L (ref 97–108)
CHLORIDE SERPL-SCNC: 106 MMOL/L (ref 97–108)
CHLORIDE SERPL-SCNC: 107 MMOL/L (ref 97–108)
CHLORIDE SERPL-SCNC: 99 MMOL/L (ref 97–108)
CO2 SERPL-SCNC: 24 MMOL/L (ref 21–32)
CO2 SERPL-SCNC: 25 MMOL/L (ref 21–32)
CO2 SERPL-SCNC: 28 MMOL/L (ref 21–32)
CO2 SERPL-SCNC: 29 MMOL/L (ref 21–32)
CO2 SERPL-SCNC: 31 MMOL/L (ref 21–32)
CO2 SERPL-SCNC: 32 MMOL/L (ref 21–32)
CO2 SERPL-SCNC: 33 MMOL/L (ref 21–32)
CO2 SERPL-SCNC: 34 MMOL/L (ref 21–32)
CO2 SERPL-SCNC: 35 MMOL/L (ref 21–32)
COLOR UR: NORMAL
CREAT SERPL-MCNC: 0.49 MG/DL (ref 0.55–1.02)
CREAT SERPL-MCNC: 0.5 MG/DL (ref 0.55–1.02)
CREAT SERPL-MCNC: 0.56 MG/DL (ref 0.55–1.02)
CREAT SERPL-MCNC: 0.58 MG/DL (ref 0.55–1.02)
CREAT SERPL-MCNC: 0.59 MG/DL (ref 0.55–1.02)
CREAT SERPL-MCNC: 0.59 MG/DL (ref 0.55–1.02)
CREAT SERPL-MCNC: 0.6 MG/DL (ref 0.55–1.02)
CREAT SERPL-MCNC: 0.65 MG/DL (ref 0.55–1.02)
CREAT SERPL-MCNC: 0.66 MG/DL (ref 0.55–1.02)
CREAT SERPL-MCNC: 0.7 MG/DL (ref 0.55–1.02)
CREAT SERPL-MCNC: 0.73 MG/DL (ref 0.55–1.02)
CREAT SERPL-MCNC: 0.74 MG/DL (ref 0.55–1.02)
CREAT SERPL-MCNC: 0.76 MG/DL (ref 0.55–1.02)
CREAT SERPL-MCNC: 0.91 MG/DL (ref 0.55–1.02)
DIFFERENTIAL METHOD BLD: ABNORMAL
DIFFERENTIAL METHOD BLD: NORMAL
EOSINOPHIL # BLD: 0 K/UL (ref 0–0.4)
EOSINOPHIL # BLD: 0.1 K/UL (ref 0–0.4)
EOSINOPHIL # BLD: 0.2 K/UL (ref 0–0.4)
EOSINOPHIL # BLD: 0.3 K/UL (ref 0–0.4)
EOSINOPHIL NFR BLD: 0 % (ref 0–7)
EOSINOPHIL NFR BLD: 0 % (ref 0–7)
EOSINOPHIL NFR BLD: 1 % (ref 0–7)
EOSINOPHIL NFR BLD: 2 % (ref 0–7)
EOSINOPHIL NFR BLD: 3 % (ref 0–7)
EOSINOPHIL NFR BLD: 5 % (ref 0–7)
EOSINOPHIL NFR BLD: 6 % (ref 0–7)
EPITH CASTS URNS QL MICRO: NORMAL /LPF
ERYTHROCYTE [DISTWIDTH] IN BLOOD BY AUTOMATED COUNT: 12.7 % (ref 11.5–14.5)
ERYTHROCYTE [DISTWIDTH] IN BLOOD BY AUTOMATED COUNT: 12.8 % (ref 11.5–14.5)
ERYTHROCYTE [DISTWIDTH] IN BLOOD BY AUTOMATED COUNT: 12.9 % (ref 11.5–14.5)
ERYTHROCYTE [DISTWIDTH] IN BLOOD BY AUTOMATED COUNT: 13 % (ref 11.5–14.5)
ERYTHROCYTE [DISTWIDTH] IN BLOOD BY AUTOMATED COUNT: 13.1 % (ref 11.5–14.5)
ERYTHROCYTE [DISTWIDTH] IN BLOOD BY AUTOMATED COUNT: 13.2 % (ref 11.5–14.5)
ERYTHROCYTE [DISTWIDTH] IN BLOOD BY AUTOMATED COUNT: 13.3 % (ref 11.5–14.5)
ERYTHROCYTE [DISTWIDTH] IN BLOOD BY AUTOMATED COUNT: 13.4 % (ref 11.5–14.5)
ERYTHROCYTE [DISTWIDTH] IN BLOOD BY AUTOMATED COUNT: 13.6 % (ref 11.5–14.5)
GLOBULIN SER CALC-MCNC: 3.8 G/DL (ref 2–4)
GLOBULIN SER CALC-MCNC: 3.9 G/DL (ref 2–4)
GLOBULIN SER CALC-MCNC: 4.1 G/DL (ref 2–4)
GLUCOSE BLD STRIP.AUTO-MCNC: 101 MG/DL (ref 65–100)
GLUCOSE BLD STRIP.AUTO-MCNC: 103 MG/DL (ref 65–100)
GLUCOSE BLD STRIP.AUTO-MCNC: 105 MG/DL (ref 65–100)
GLUCOSE BLD STRIP.AUTO-MCNC: 107 MG/DL (ref 65–100)
GLUCOSE BLD STRIP.AUTO-MCNC: 113 MG/DL (ref 65–100)
GLUCOSE BLD STRIP.AUTO-MCNC: 114 MG/DL (ref 65–100)
GLUCOSE BLD STRIP.AUTO-MCNC: 120 MG/DL (ref 65–100)
GLUCOSE BLD STRIP.AUTO-MCNC: 122 MG/DL (ref 65–100)
GLUCOSE BLD STRIP.AUTO-MCNC: 127 MG/DL (ref 65–100)
GLUCOSE BLD STRIP.AUTO-MCNC: 128 MG/DL (ref 65–100)
GLUCOSE BLD STRIP.AUTO-MCNC: 132 MG/DL (ref 65–100)
GLUCOSE BLD STRIP.AUTO-MCNC: 69 MG/DL (ref 65–100)
GLUCOSE BLD STRIP.AUTO-MCNC: 69 MG/DL (ref 65–100)
GLUCOSE BLD STRIP.AUTO-MCNC: 86 MG/DL (ref 65–100)
GLUCOSE BLD STRIP.AUTO-MCNC: 92 MG/DL (ref 65–100)
GLUCOSE BLD STRIP.AUTO-MCNC: 99 MG/DL (ref 65–100)
GLUCOSE SERPL-MCNC: 104 MG/DL (ref 65–100)
GLUCOSE SERPL-MCNC: 112 MG/DL (ref 65–100)
GLUCOSE SERPL-MCNC: 112 MG/DL (ref 65–100)
GLUCOSE SERPL-MCNC: 115 MG/DL (ref 65–100)
GLUCOSE SERPL-MCNC: 119 MG/DL (ref 65–100)
GLUCOSE SERPL-MCNC: 119 MG/DL (ref 65–100)
GLUCOSE SERPL-MCNC: 130 MG/DL (ref 65–100)
GLUCOSE SERPL-MCNC: 133 MG/DL (ref 65–100)
GLUCOSE SERPL-MCNC: 140 MG/DL (ref 65–100)
GLUCOSE SERPL-MCNC: 141 MG/DL (ref 65–100)
GLUCOSE SERPL-MCNC: 80 MG/DL (ref 65–100)
GLUCOSE SERPL-MCNC: 83 MG/DL (ref 65–100)
GLUCOSE SERPL-MCNC: 92 MG/DL (ref 65–100)
GLUCOSE SERPL-MCNC: 95 MG/DL (ref 65–100)
GLUCOSE UR STRIP.AUTO-MCNC: NEGATIVE MG/DL
HCT VFR BLD AUTO: 35.7 % (ref 35–47)
HCT VFR BLD AUTO: 36.4 % (ref 35–47)
HCT VFR BLD AUTO: 37.9 % (ref 35–47)
HCT VFR BLD AUTO: 39.1 % (ref 35–47)
HCT VFR BLD AUTO: 39.3 % (ref 35–47)
HCT VFR BLD AUTO: 39.4 % (ref 35–47)
HCT VFR BLD AUTO: 39.6 % (ref 35–47)
HCT VFR BLD AUTO: 40 % (ref 35–47)
HCT VFR BLD AUTO: 40 % (ref 35–47)
HCT VFR BLD AUTO: 41.3 % (ref 35–47)
HCT VFR BLD AUTO: 43.1 % (ref 35–47)
HCT VFR BLD AUTO: 46 % (ref 35–47)
HCT VFR BLD AUTO: 47 % (ref 35–47)
HGB BLD-MCNC: 11.4 G/DL (ref 11.5–16)
HGB BLD-MCNC: 11.9 G/DL (ref 11.5–16)
HGB BLD-MCNC: 12.2 G/DL (ref 11.5–16)
HGB BLD-MCNC: 12.6 G/DL (ref 11.5–16)
HGB BLD-MCNC: 12.7 G/DL (ref 11.5–16)
HGB BLD-MCNC: 12.9 G/DL (ref 11.5–16)
HGB BLD-MCNC: 13.1 G/DL (ref 11.5–16)
HGB BLD-MCNC: 13.2 G/DL (ref 11.5–16)
HGB BLD-MCNC: 13.3 G/DL (ref 11.5–16)
HGB BLD-MCNC: 13.3 G/DL (ref 11.5–16)
HGB BLD-MCNC: 14.1 G/DL (ref 11.5–16)
HGB BLD-MCNC: 14.6 G/DL (ref 11.5–16)
HGB BLD-MCNC: 15.3 G/DL (ref 11.5–16)
HGB UR QL STRIP: NEGATIVE
HYALINE CASTS URNS QL MICRO: NORMAL /LPF (ref 0–5)
IMM GRANULOCYTES # BLD AUTO: 0 K/UL (ref 0–0.04)
IMM GRANULOCYTES NFR BLD AUTO: 0 % (ref 0–0.5)
INR PPP: 1.1 (ref 0.9–1.1)
KETONES UR QL STRIP.AUTO: NEGATIVE MG/DL
LEUKOCYTE ESTERASE UR QL STRIP.AUTO: NEGATIVE
LEVETIRACETAM SERPL-MCNC: 80.3 UG/ML (ref 10–40)
LYMPHOCYTES # BLD: 0.3 K/UL (ref 0.8–3.5)
LYMPHOCYTES # BLD: 0.5 K/UL (ref 0.8–3.5)
LYMPHOCYTES # BLD: 0.6 K/UL (ref 0.8–3.5)
LYMPHOCYTES # BLD: 0.6 K/UL (ref 0.8–3.5)
LYMPHOCYTES # BLD: 0.7 K/UL (ref 0.8–3.5)
LYMPHOCYTES # BLD: 0.9 K/UL (ref 0.8–3.5)
LYMPHOCYTES # BLD: 1 K/UL (ref 0.8–3.5)
LYMPHOCYTES # BLD: 1.1 K/UL (ref 0.8–3.5)
LYMPHOCYTES NFR BLD: 13 % (ref 12–49)
LYMPHOCYTES NFR BLD: 14 % (ref 12–49)
LYMPHOCYTES NFR BLD: 14 % (ref 12–49)
LYMPHOCYTES NFR BLD: 15 % (ref 12–49)
LYMPHOCYTES NFR BLD: 17 % (ref 12–49)
LYMPHOCYTES NFR BLD: 18 % (ref 12–49)
LYMPHOCYTES NFR BLD: 19 % (ref 12–49)
LYMPHOCYTES NFR BLD: 19 % (ref 12–49)
LYMPHOCYTES NFR BLD: 20 % (ref 12–49)
LYMPHOCYTES NFR BLD: 4 % (ref 12–49)
LYMPHOCYTES NFR BLD: 6 % (ref 12–49)
LYMPHOCYTES NFR BLD: 9 % (ref 12–49)
MAGNESIUM SERPL-MCNC: 1.7 MG/DL (ref 1.6–2.4)
MAGNESIUM SERPL-MCNC: 1.9 MG/DL (ref 1.6–2.4)
MAGNESIUM SERPL-MCNC: 1.9 MG/DL (ref 1.6–2.4)
MAGNESIUM SERPL-MCNC: 2 MG/DL (ref 1.6–2.4)
MAGNESIUM SERPL-MCNC: 2 MG/DL (ref 1.6–2.4)
MAGNESIUM SERPL-MCNC: 2.1 MG/DL (ref 1.6–2.4)
MAGNESIUM SERPL-MCNC: 2.1 MG/DL (ref 1.6–2.4)
MAGNESIUM SERPL-MCNC: 2.3 MG/DL (ref 1.6–2.4)
MAGNESIUM SERPL-MCNC: 2.4 MG/DL (ref 1.6–2.4)
MAGNESIUM SERPL-MCNC: 2.4 MG/DL (ref 1.6–2.4)
MAGNESIUM SERPL-MCNC: 2.6 MG/DL (ref 1.6–2.4)
MCH RBC QN AUTO: 30.7 PG (ref 26–34)
MCH RBC QN AUTO: 30.8 PG (ref 26–34)
MCH RBC QN AUTO: 30.8 PG (ref 26–34)
MCH RBC QN AUTO: 30.9 PG (ref 26–34)
MCH RBC QN AUTO: 31.1 PG (ref 26–34)
MCH RBC QN AUTO: 31.2 PG (ref 26–34)
MCH RBC QN AUTO: 31.2 PG (ref 26–34)
MCH RBC QN AUTO: 31.4 PG (ref 26–34)
MCH RBC QN AUTO: 31.5 PG (ref 26–34)
MCH RBC QN AUTO: 31.7 PG (ref 26–34)
MCH RBC QN AUTO: 31.7 PG (ref 26–34)
MCH RBC QN AUTO: 31.8 PG (ref 26–34)
MCH RBC QN AUTO: 31.9 PG (ref 26–34)
MCHC RBC AUTO-ENTMCNC: 31.1 G/DL (ref 30–36.5)
MCHC RBC AUTO-ENTMCNC: 31.8 G/DL (ref 30–36.5)
MCHC RBC AUTO-ENTMCNC: 31.9 G/DL (ref 30–36.5)
MCHC RBC AUTO-ENTMCNC: 32.2 G/DL (ref 30–36.5)
MCHC RBC AUTO-ENTMCNC: 32.2 G/DL (ref 30–36.5)
MCHC RBC AUTO-ENTMCNC: 32.3 G/DL (ref 30–36.5)
MCHC RBC AUTO-ENTMCNC: 32.5 G/DL (ref 30–36.5)
MCHC RBC AUTO-ENTMCNC: 32.7 G/DL (ref 30–36.5)
MCHC RBC AUTO-ENTMCNC: 32.7 G/DL (ref 30–36.5)
MCHC RBC AUTO-ENTMCNC: 33.2 G/DL (ref 30–36.5)
MCHC RBC AUTO-ENTMCNC: 33.3 G/DL (ref 30–36.5)
MCHC RBC AUTO-ENTMCNC: 33.3 G/DL (ref 30–36.5)
MCHC RBC AUTO-ENTMCNC: 33.6 G/DL (ref 30–36.5)
MCV RBC AUTO: 102.2 FL (ref 80–99)
MCV RBC AUTO: 94.5 FL (ref 80–99)
MCV RBC AUTO: 94.5 FL (ref 80–99)
MCV RBC AUTO: 94.8 FL (ref 80–99)
MCV RBC AUTO: 95.5 FL (ref 80–99)
MCV RBC AUTO: 95.6 FL (ref 80–99)
MCV RBC AUTO: 95.7 FL (ref 80–99)
MCV RBC AUTO: 95.8 FL (ref 80–99)
MCV RBC AUTO: 96 FL (ref 80–99)
MCV RBC AUTO: 96.4 FL (ref 80–99)
MCV RBC AUTO: 96.5 FL (ref 80–99)
MCV RBC AUTO: 96.6 FL (ref 80–99)
MCV RBC AUTO: 97.1 FL (ref 80–99)
MONOCYTES # BLD: 0.2 K/UL (ref 0–1)
MONOCYTES # BLD: 0.5 K/UL (ref 0–1)
MONOCYTES # BLD: 0.6 K/UL (ref 0–1)
MONOCYTES # BLD: 0.6 K/UL (ref 0–1)
MONOCYTES # BLD: 0.7 K/UL (ref 0–1)
MONOCYTES # BLD: 1.1 K/UL (ref 0–1)
MONOCYTES NFR BLD: 10 % (ref 5–13)
MONOCYTES NFR BLD: 12 % (ref 5–13)
MONOCYTES NFR BLD: 12 % (ref 5–13)
MONOCYTES NFR BLD: 13 % (ref 5–13)
MONOCYTES NFR BLD: 14 % (ref 5–13)
MONOCYTES NFR BLD: 16 % (ref 5–13)
MONOCYTES NFR BLD: 2 % (ref 5–13)
MONOCYTES NFR BLD: 8 % (ref 5–13)
MONOCYTES NFR BLD: 9 % (ref 5–13)
NEUTS SEG # BLD: 2.2 K/UL (ref 1.8–8)
NEUTS SEG # BLD: 2.7 K/UL (ref 1.8–8)
NEUTS SEG # BLD: 2.8 K/UL (ref 1.8–8)
NEUTS SEG # BLD: 2.8 K/UL (ref 1.8–8)
NEUTS SEG # BLD: 3.2 K/UL (ref 1.8–8)
NEUTS SEG # BLD: 3.5 K/UL (ref 1.8–8)
NEUTS SEG # BLD: 4 K/UL (ref 1.8–8)
NEUTS SEG # BLD: 4.6 K/UL (ref 1.8–8)
NEUTS SEG # BLD: 4.6 K/UL (ref 1.8–8)
NEUTS SEG # BLD: 5.2 K/UL (ref 1.8–8)
NEUTS SEG # BLD: 6.9 K/UL (ref 1.8–8)
NEUTS SEG # BLD: 7.5 K/UL (ref 1.8–8)
NEUTS SEG NFR BLD: 60 % (ref 32–75)
NEUTS SEG NFR BLD: 62 % (ref 32–75)
NEUTS SEG NFR BLD: 63 % (ref 32–75)
NEUTS SEG NFR BLD: 65 % (ref 32–75)
NEUTS SEG NFR BLD: 66 % (ref 32–75)
NEUTS SEG NFR BLD: 66 % (ref 32–75)
NEUTS SEG NFR BLD: 68 % (ref 32–75)
NEUTS SEG NFR BLD: 72 % (ref 32–75)
NEUTS SEG NFR BLD: 72 % (ref 32–75)
NEUTS SEG NFR BLD: 79 % (ref 32–75)
NEUTS SEG NFR BLD: 85 % (ref 32–75)
NEUTS SEG NFR BLD: 94 % (ref 32–75)
NITRITE UR QL STRIP.AUTO: NEGATIVE
NRBC # BLD: 0 K/UL (ref 0–0.01)
NRBC BLD-RTO: 0 PER 100 WBC
PH UR STRIP: 7 [PH] (ref 5–8)
PHOSPHATE SERPL-MCNC: 1.8 MG/DL (ref 2.6–4.7)
PHOSPHATE SERPL-MCNC: 1.9 MG/DL (ref 2.6–4.7)
PHOSPHATE SERPL-MCNC: 2.4 MG/DL (ref 2.6–4.7)
PHOSPHATE SERPL-MCNC: 2.9 MG/DL (ref 2.6–4.7)
PHOSPHATE SERPL-MCNC: 3 MG/DL (ref 2.6–4.7)
PHOSPHATE SERPL-MCNC: 3 MG/DL (ref 2.6–4.7)
PHOSPHATE SERPL-MCNC: 3.2 MG/DL (ref 2.6–4.7)
PHOSPHATE SERPL-MCNC: 3.5 MG/DL (ref 2.6–4.7)
PLATELET # BLD AUTO: 192 K/UL (ref 150–400)
PLATELET # BLD AUTO: 210 K/UL (ref 150–400)
PLATELET # BLD AUTO: 220 K/UL (ref 150–400)
PLATELET # BLD AUTO: 227 K/UL (ref 150–400)
PLATELET # BLD AUTO: 243 K/UL (ref 150–400)
PLATELET # BLD AUTO: 247 K/UL (ref 150–400)
PLATELET # BLD AUTO: 261 K/UL (ref 150–400)
PLATELET # BLD AUTO: 276 K/UL (ref 150–400)
PLATELET # BLD AUTO: 283 K/UL (ref 150–400)
PLATELET # BLD AUTO: 286 K/UL (ref 150–400)
PLATELET # BLD AUTO: 287 K/UL (ref 150–400)
PLATELET # BLD AUTO: 305 K/UL (ref 150–400)
PLATELET # BLD AUTO: 321 K/UL (ref 150–400)
PMV BLD AUTO: 10 FL (ref 8.9–12.9)
PMV BLD AUTO: 10 FL (ref 8.9–12.9)
PMV BLD AUTO: 10.1 FL (ref 8.9–12.9)
PMV BLD AUTO: 10.2 FL (ref 8.9–12.9)
PMV BLD AUTO: 10.2 FL (ref 8.9–12.9)
PMV BLD AUTO: 10.3 FL (ref 8.9–12.9)
PMV BLD AUTO: 10.3 FL (ref 8.9–12.9)
PMV BLD AUTO: 10.4 FL (ref 8.9–12.9)
PMV BLD AUTO: 10.5 FL (ref 8.9–12.9)
PMV BLD AUTO: 10.6 FL (ref 8.9–12.9)
PMV BLD AUTO: 9.8 FL (ref 8.9–12.9)
POTASSIUM SERPL-SCNC: 3.2 MMOL/L (ref 3.5–5.1)
POTASSIUM SERPL-SCNC: 3.3 MMOL/L (ref 3.5–5.1)
POTASSIUM SERPL-SCNC: 3.3 MMOL/L (ref 3.5–5.1)
POTASSIUM SERPL-SCNC: 3.4 MMOL/L (ref 3.5–5.1)
POTASSIUM SERPL-SCNC: 3.4 MMOL/L (ref 3.5–5.1)
POTASSIUM SERPL-SCNC: 3.5 MMOL/L (ref 3.5–5.1)
POTASSIUM SERPL-SCNC: 3.6 MMOL/L (ref 3.5–5.1)
POTASSIUM SERPL-SCNC: 3.9 MMOL/L (ref 3.5–5.1)
POTASSIUM SERPL-SCNC: 3.9 MMOL/L (ref 3.5–5.1)
POTASSIUM SERPL-SCNC: 4 MMOL/L (ref 3.5–5.1)
POTASSIUM SERPL-SCNC: 4.2 MMOL/L (ref 3.5–5.1)
POTASSIUM SERPL-SCNC: 4.3 MMOL/L (ref 3.5–5.1)
PROT SERPL-MCNC: 6.4 G/DL (ref 6.4–8.2)
PROT SERPL-MCNC: 6.5 G/DL (ref 6.4–8.2)
PROT SERPL-MCNC: 7 G/DL (ref 6.4–8.2)
PROT UR STRIP-MCNC: NEGATIVE MG/DL
PROTHROMBIN TIME: 11.1 SEC (ref 9–11.1)
RBC # BLD AUTO: 3.7 M/UL (ref 3.8–5.2)
RBC # BLD AUTO: 3.81 M/UL (ref 3.8–5.2)
RBC # BLD AUTO: 3.96 M/UL (ref 3.8–5.2)
RBC # BLD AUTO: 4.09 M/UL (ref 3.8–5.2)
RBC # BLD AUTO: 4.11 M/UL (ref 3.8–5.2)
RBC # BLD AUTO: 4.14 M/UL (ref 3.8–5.2)
RBC # BLD AUTO: 4.16 M/UL (ref 3.8–5.2)
RBC # BLD AUTO: 4.17 M/UL (ref 3.8–5.2)
RBC # BLD AUTO: 4.22 M/UL (ref 3.8–5.2)
RBC # BLD AUTO: 4.3 M/UL (ref 3.8–5.2)
RBC # BLD AUTO: 4.44 M/UL (ref 3.8–5.2)
RBC # BLD AUTO: 4.6 M/UL (ref 3.8–5.2)
RBC # BLD AUTO: 4.8 M/UL (ref 3.8–5.2)
RBC #/AREA URNS HPF: NORMAL /HPF (ref 0–5)
RBC MORPH BLD: ABNORMAL
SERVICE CMNT-IMP: ABNORMAL
SERVICE CMNT-IMP: NORMAL
SODIUM SERPL-SCNC: 134 MMOL/L (ref 136–145)
SODIUM SERPL-SCNC: 136 MMOL/L (ref 136–145)
SODIUM SERPL-SCNC: 137 MMOL/L (ref 136–145)
SODIUM SERPL-SCNC: 138 MMOL/L (ref 136–145)
SODIUM SERPL-SCNC: 138 MMOL/L (ref 136–145)
SODIUM SERPL-SCNC: 139 MMOL/L (ref 136–145)
SODIUM SERPL-SCNC: 140 MMOL/L (ref 136–145)
SODIUM SERPL-SCNC: 140 MMOL/L (ref 136–145)
SODIUM SERPL-SCNC: 141 MMOL/L (ref 136–145)
SODIUM SERPL-SCNC: 142 MMOL/L (ref 136–145)
SP GR UR REFRACTOMETRY: 1.02 (ref 1–1.03)
THERAPEUTIC RANGE,PTTT: NORMAL SECS (ref 58–77)
UA: UC IF INDICATED,UAUC: NORMAL
UROBILINOGEN UR QL STRIP.AUTO: 1 EU/DL (ref 0.2–1)
VALPROATE SERPL-MCNC: 81 UG/ML (ref 50–100)
WBC # BLD AUTO: 3.7 K/UL (ref 3.6–11)
WBC # BLD AUTO: 4.2 K/UL (ref 3.6–11)
WBC # BLD AUTO: 4.5 K/UL (ref 3.6–11)
WBC # BLD AUTO: 4.6 K/UL (ref 3.6–11)
WBC # BLD AUTO: 4.8 K/UL (ref 3.6–11)
WBC # BLD AUTO: 5.3 K/UL (ref 3.6–11)
WBC # BLD AUTO: 5.6 K/UL (ref 3.6–11)
WBC # BLD AUTO: 6.4 K/UL (ref 3.6–11)
WBC # BLD AUTO: 6.7 K/UL (ref 3.6–11)
WBC # BLD AUTO: 6.8 K/UL (ref 3.6–11)
WBC # BLD AUTO: 6.9 K/UL (ref 3.6–11)
WBC # BLD AUTO: 8 K/UL (ref 3.6–11)
WBC # BLD AUTO: 8.2 K/UL (ref 3.6–11)
WBC URNS QL MICRO: NORMAL /HPF (ref 0–4)

## 2020-01-01 PROCEDURE — 74011000250 HC RX REV CODE- 250: Performed by: ANESTHESIOLOGY

## 2020-01-01 PROCEDURE — 85025 COMPLETE CBC W/AUTO DIFF WBC: CPT

## 2020-01-01 PROCEDURE — 80048 BASIC METABOLIC PNL TOTAL CA: CPT

## 2020-01-01 PROCEDURE — 83735 ASSAY OF MAGNESIUM: CPT

## 2020-01-01 PROCEDURE — 88304 TISSUE EXAM BY PATHOLOGIST: CPT

## 2020-01-01 PROCEDURE — 82962 GLUCOSE BLOOD TEST: CPT

## 2020-01-01 PROCEDURE — 74011250637 HC RX REV CODE- 250/637: Performed by: INTERNAL MEDICINE

## 2020-01-01 PROCEDURE — 99285 EMERGENCY DEPT VISIT HI MDM: CPT

## 2020-01-01 PROCEDURE — 92610 EVALUATE SWALLOWING FUNCTION: CPT | Performed by: SPEECH-LANGUAGE PATHOLOGIST

## 2020-01-01 PROCEDURE — 97116 GAIT TRAINING THERAPY: CPT

## 2020-01-01 PROCEDURE — 3336500001 HSPC ELECTION

## 2020-01-01 PROCEDURE — 92526 ORAL FUNCTION THERAPY: CPT

## 2020-01-01 PROCEDURE — 84100 ASSAY OF PHOSPHORUS: CPT

## 2020-01-01 PROCEDURE — 77030002946 HC SUT NRLN J&J -B: Performed by: SPECIALIST

## 2020-01-01 PROCEDURE — C1713 ANCHOR/SCREW BN/BN,TIS/BN: HCPCS | Performed by: SPECIALIST

## 2020-01-01 PROCEDURE — 36415 COLL VENOUS BLD VENIPUNCTURE: CPT

## 2020-01-01 PROCEDURE — 0656 HSPC GENERAL INPATIENT

## 2020-01-01 PROCEDURE — 74011250636 HC RX REV CODE- 250/636: Performed by: ANESTHESIOLOGY

## 2020-01-01 PROCEDURE — 97162 PT EVAL MOD COMPLEX 30 MIN: CPT

## 2020-01-01 PROCEDURE — 74011250637 HC RX REV CODE- 250/637: Performed by: PSYCHIATRY & NEUROLOGY

## 2020-01-01 PROCEDURE — 74011000258 HC RX REV CODE- 258: Performed by: FAMILY MEDICINE

## 2020-01-01 PROCEDURE — 77030040361 HC SLV COMPR DVT MDII -B: Performed by: SPECIALIST

## 2020-01-01 PROCEDURE — 74011250637 HC RX REV CODE- 250/637: Performed by: ANESTHESIOLOGY

## 2020-01-01 PROCEDURE — 74011250637 HC RX REV CODE- 250/637: Performed by: NURSE PRACTITIONER

## 2020-01-01 PROCEDURE — 74011250636 HC RX REV CODE- 250/636: Performed by: FAMILY MEDICINE

## 2020-01-01 PROCEDURE — 74011000250 HC RX REV CODE- 250: Performed by: FAMILY MEDICINE

## 2020-01-01 PROCEDURE — 65610000006 HC RM INTENSIVE CARE

## 2020-01-01 PROCEDURE — 80164 ASSAY DIPROPYLACETIC ACD TOT: CPT

## 2020-01-01 PROCEDURE — 74011250636 HC RX REV CODE- 250/636

## 2020-01-01 PROCEDURE — 80053 COMPREHEN METABOLIC PANEL: CPT

## 2020-01-01 PROCEDURE — 65270000029 HC RM PRIVATE

## 2020-01-01 PROCEDURE — 94762 N-INVAS EAR/PLS OXIMTRY CONT: CPT

## 2020-01-01 PROCEDURE — 77030008684 HC TU ET CUF COVD -B: Performed by: ANESTHESIOLOGY

## 2020-01-01 PROCEDURE — 77030009081 HC CLP NEUR GUN SET MEDT -B: Performed by: SPECIALIST

## 2020-01-01 PROCEDURE — 74011250636 HC RX REV CODE- 250/636: Performed by: SPECIALIST

## 2020-01-01 PROCEDURE — 74011000258 HC RX REV CODE- 258: Performed by: ANESTHESIOLOGY

## 2020-01-01 PROCEDURE — 70450 CT HEAD/BRAIN W/O DYE: CPT

## 2020-01-01 PROCEDURE — 81001 URINALYSIS AUTO W/SCOPE: CPT

## 2020-01-01 PROCEDURE — G0299 HHS/HOSPICE OF RN EA 15 MIN: HCPCS

## 2020-01-01 PROCEDURE — 74011000258 HC RX REV CODE- 258: Performed by: NURSE PRACTITIONER

## 2020-01-01 PROCEDURE — 74011250636 HC RX REV CODE- 250/636: Performed by: NURSE PRACTITIONER

## 2020-01-01 PROCEDURE — 74011000258 HC RX REV CODE- 258: Performed by: NURSE ANESTHETIST, CERTIFIED REGISTERED

## 2020-01-01 PROCEDURE — 95714 VEEG EA 12-26 HR UNMNTR: CPT | Performed by: INTERNAL MEDICINE

## 2020-01-01 PROCEDURE — 74011250636 HC RX REV CODE- 250/636: Performed by: INTERNAL MEDICINE

## 2020-01-01 PROCEDURE — P9045 ALBUMIN (HUMAN), 5%, 250 ML: HCPCS

## 2020-01-01 PROCEDURE — 74011250637 HC RX REV CODE- 250/637: Performed by: HOSPITALIST

## 2020-01-01 PROCEDURE — 74011250636 HC RX REV CODE- 250/636: Performed by: HOSPITALIST

## 2020-01-01 PROCEDURE — 97535 SELF CARE MNGMENT TRAINING: CPT

## 2020-01-01 PROCEDURE — 99233 SBSQ HOSP IP/OBS HIGH 50: CPT | Performed by: FAMILY MEDICINE

## 2020-01-01 PROCEDURE — 02HV33Z INSERTION OF INFUSION DEVICE INTO SUPERIOR VENA CAVA, PERCUTANEOUS APPROACH: ICD-10-PCS

## 2020-01-01 PROCEDURE — 74011250636 HC RX REV CODE- 250/636: Performed by: NURSE ANESTHETIST, CERTIFIED REGISTERED

## 2020-01-01 PROCEDURE — 74011250637 HC RX REV CODE- 250/637: Performed by: FAMILY MEDICINE

## 2020-01-01 PROCEDURE — 36592 COLLECT BLOOD FROM PICC: CPT

## 2020-01-01 PROCEDURE — 74011250637 HC RX REV CODE- 250/637: Performed by: SPECIALIST

## 2020-01-01 PROCEDURE — 51701 INSERT BLADDER CATHETER: CPT

## 2020-01-01 PROCEDURE — 77030011640 HC PAD GRND REM COVD -A: Performed by: SPECIALIST

## 2020-01-01 PROCEDURE — 65660000000 HC RM CCU STEPDOWN

## 2020-01-01 PROCEDURE — 77010033678 HC OXYGEN DAILY

## 2020-01-01 PROCEDURE — 76210000016 HC OR PH I REC 1 TO 1.5 HR: Performed by: SPECIALIST

## 2020-01-01 PROCEDURE — 74011000250 HC RX REV CODE- 250: Performed by: NURSE ANESTHETIST, CERTIFIED REGISTERED

## 2020-01-01 PROCEDURE — 85610 PROTHROMBIN TIME: CPT

## 2020-01-01 PROCEDURE — 76010000171 HC OR TIME 2 TO 2.5 HR INTENSV-TIER 1: Performed by: SPECIALIST

## 2020-01-01 PROCEDURE — 77030014650 HC SEAL MTRX FLOSEL BAXT -C: Performed by: SPECIALIST

## 2020-01-01 PROCEDURE — 74011000250 HC RX REV CODE- 250: Performed by: INTERNAL MEDICINE

## 2020-01-01 PROCEDURE — 74011000272 HC RX REV CODE- 272: Performed by: SPECIALIST

## 2020-01-01 PROCEDURE — 74011000250 HC RX REV CODE- 250: Performed by: SPECIALIST

## 2020-01-01 PROCEDURE — 95816 EEG AWAKE AND DROWSY: CPT | Performed by: ANESTHESIOLOGY

## 2020-01-01 PROCEDURE — 77030026438 HC STYL ET INTUB CARD -A: Performed by: ANESTHESIOLOGY

## 2020-01-01 PROCEDURE — 77030040506 HC DRN WND MDII -A: Performed by: SPECIALIST

## 2020-01-01 PROCEDURE — 74011000250 HC RX REV CODE- 250: Performed by: NURSE PRACTITIONER

## 2020-01-01 PROCEDURE — 76060000035 HC ANESTHESIA 2 TO 2.5 HR: Performed by: SPECIALIST

## 2020-01-01 PROCEDURE — 76937 US GUIDE VASCULAR ACCESS: CPT

## 2020-01-01 PROCEDURE — 70496 CT ANGIOGRAPHY HEAD: CPT

## 2020-01-01 PROCEDURE — 74018 RADEX ABDOMEN 1 VIEW: CPT

## 2020-01-01 PROCEDURE — 74011000258 HC RX REV CODE- 258: Performed by: SPECIALIST

## 2020-01-01 PROCEDURE — 85730 THROMBOPLASTIN TIME PARTIAL: CPT

## 2020-01-01 PROCEDURE — 71045 X-RAY EXAM CHEST 1 VIEW: CPT

## 2020-01-01 PROCEDURE — C9254 INJECTION, LACOSAMIDE: HCPCS | Performed by: ANESTHESIOLOGY

## 2020-01-01 PROCEDURE — 99233 SBSQ HOSP IP/OBS HIGH 50: CPT | Performed by: INTERNAL MEDICINE

## 2020-01-01 PROCEDURE — 97530 THERAPEUTIC ACTIVITIES: CPT

## 2020-01-01 PROCEDURE — 74011250636 HC RX REV CODE- 250/636: Performed by: EMERGENCY MEDICINE

## 2020-01-01 PROCEDURE — 77030034696 HC CATH URETH FOL 2W BARD -A: Performed by: SPECIALIST

## 2020-01-01 PROCEDURE — 77030003029 HC SUT VCRL J&J -B: Performed by: SPECIALIST

## 2020-01-01 PROCEDURE — 74011636320 HC RX REV CODE- 636/320: Performed by: EMERGENCY MEDICINE

## 2020-01-01 PROCEDURE — 77030020365 HC SOL INJ SOD CL 0.9% 50ML

## 2020-01-01 PROCEDURE — 80177 DRUG SCRN QUAN LEVETIRACETAM: CPT

## 2020-01-01 PROCEDURE — 77030018836 HC SOL IRR NACL ICUM -A: Performed by: SPECIALIST

## 2020-01-01 PROCEDURE — 85027 COMPLETE CBC AUTOMATED: CPT

## 2020-01-01 PROCEDURE — 99223 1ST HOSP IP/OBS HIGH 75: CPT | Performed by: FAMILY MEDICINE

## 2020-01-01 PROCEDURE — 74011000258 HC RX REV CODE- 258: Performed by: INTERNAL MEDICINE

## 2020-01-01 PROCEDURE — 92610 EVALUATE SWALLOWING FUNCTION: CPT

## 2020-01-01 PROCEDURE — 009400Z DRAINAGE OF INTRACRANIAL SUBDURAL SPACE WITH DRAINAGE DEVICE, OPEN APPROACH: ICD-10-PCS | Performed by: SPECIALIST

## 2020-01-01 PROCEDURE — 77030040704 HC NSL TU RETAIN SYS BRDL PRO AMR -B

## 2020-01-01 PROCEDURE — 77030004472 HC BUR TAPR MEDT -B: Performed by: SPECIALIST

## 2020-01-01 PROCEDURE — 77030014008 HC SPNG HEMSTAT J&J -C: Performed by: SPECIALIST

## 2020-01-01 PROCEDURE — 95714 VEEG EA 12-26 HR UNMNTR: CPT | Performed by: PSYCHIATRY & NEUROLOGY

## 2020-01-01 PROCEDURE — 36573 INSJ PICC RS&I 5 YR+: CPT | Performed by: INTERNAL MEDICINE

## 2020-01-01 PROCEDURE — 0DH67UZ INSERTION OF FEEDING DEVICE INTO STOMACH, VIA NATURAL OR ARTIFICIAL OPENING: ICD-10-PCS | Performed by: INTERNAL MEDICINE

## 2020-01-01 PROCEDURE — 51798 US URINE CAPACITY MEASURE: CPT

## 2020-01-01 PROCEDURE — C1751 CATH, INF, PER/CENT/MIDLINE: HCPCS

## 2020-01-01 PROCEDURE — 77030003892 HC BIT DRL TWST MEDT -B: Performed by: SPECIALIST

## 2020-01-01 PROCEDURE — 97165 OT EVAL LOW COMPLEX 30 MIN: CPT

## 2020-01-01 PROCEDURE — G0300 HHS/HOSPICE OF LPN EA 15 MIN: HCPCS

## 2020-01-01 DEVICE — SCREW BONE L4MM DIA1.5MM CRANIOMAXILLOFACIAL GLD TI ST: Type: IMPLANTABLE DEVICE | Site: CRANIAL | Status: FUNCTIONAL

## 2020-01-01 DEVICE — PLATE BONE SZ 1.5MM REG 6 H SLV DBL Y SHP TRAUMAONE LORENZ: Type: IMPLANTABLE DEVICE | Site: CRANIAL | Status: FUNCTIONAL

## 2020-01-01 RX ORDER — FENTANYL CITRATE 50 UG/ML
INJECTION, SOLUTION INTRAMUSCULAR; INTRAVENOUS AS NEEDED
Status: DISCONTINUED | OUTPATIENT
Start: 2020-01-01 | End: 2020-01-01 | Stop reason: HOSPADM

## 2020-01-01 RX ORDER — ONDANSETRON 2 MG/ML
4 INJECTION INTRAMUSCULAR; INTRAVENOUS AS NEEDED
Status: DISCONTINUED | OUTPATIENT
Start: 2020-01-01 | End: 2020-01-01 | Stop reason: HOSPADM

## 2020-01-01 RX ORDER — MAGNESIUM SULFATE HEPTAHYDRATE 40 MG/ML
2 INJECTION, SOLUTION INTRAVENOUS ONCE
Status: COMPLETED | OUTPATIENT
Start: 2020-01-01 | End: 2020-01-01

## 2020-01-01 RX ORDER — QUETIAPINE FUMARATE 25 MG/1
TABLET, FILM COATED ORAL
Status: DISPENSED
Start: 2020-01-01 | End: 2020-01-01

## 2020-01-01 RX ORDER — SODIUM CHLORIDE 0.9 % (FLUSH) 0.9 %
5-40 SYRINGE (ML) INJECTION AS NEEDED
Status: DISCONTINUED | OUTPATIENT
Start: 2020-01-01 | End: 2020-01-01 | Stop reason: HOSPADM

## 2020-01-01 RX ORDER — MIDAZOLAM HYDROCHLORIDE 1 MG/ML
1 INJECTION, SOLUTION INTRAMUSCULAR; INTRAVENOUS AS NEEDED
Status: DISCONTINUED | OUTPATIENT
Start: 2020-01-01 | End: 2020-01-01 | Stop reason: HOSPADM

## 2020-01-01 RX ORDER — LEVOTHYROXINE SODIUM 150 UG/1
75 TABLET ORAL DAILY
Status: DISCONTINUED | OUTPATIENT
Start: 2020-01-01 | End: 2020-01-01 | Stop reason: HOSPADM

## 2020-01-01 RX ORDER — MORPHINE SULFATE 2 MG/ML
2 INJECTION, SOLUTION INTRAMUSCULAR; INTRAVENOUS
Status: DISCONTINUED | OUTPATIENT
Start: 2020-01-01 | End: 2020-01-01 | Stop reason: HOSPADM

## 2020-01-01 RX ORDER — DEXTROSE MONOHYDRATE 100 MG/ML
INJECTION, SOLUTION INTRAVENOUS
Status: DISPENSED
Start: 2020-01-01 | End: 2020-01-01

## 2020-01-01 RX ORDER — GLYCOPYRROLATE 0.2 MG/ML
INJECTION INTRAMUSCULAR; INTRAVENOUS AS NEEDED
Status: DISCONTINUED | OUTPATIENT
Start: 2020-01-01 | End: 2020-01-01 | Stop reason: HOSPADM

## 2020-01-01 RX ORDER — POTASSIUM CHLORIDE 14.9 MG/ML
10 INJECTION INTRAVENOUS
Status: COMPLETED | OUTPATIENT
Start: 2020-01-01 | End: 2020-01-01

## 2020-01-01 RX ORDER — PHENYLEPHRINE HCL IN 0.9% NACL 0.4MG/10ML
SYRINGE (ML) INTRAVENOUS AS NEEDED
Status: DISCONTINUED | OUTPATIENT
Start: 2020-01-01 | End: 2020-01-01 | Stop reason: HOSPADM

## 2020-01-01 RX ORDER — MIDAZOLAM HYDROCHLORIDE 1 MG/ML
0.5 INJECTION, SOLUTION INTRAMUSCULAR; INTRAVENOUS
Status: DISCONTINUED | OUTPATIENT
Start: 2020-01-01 | End: 2020-01-01 | Stop reason: HOSPADM

## 2020-01-01 RX ORDER — HALOPERIDOL 5 MG/ML
2 INJECTION INTRAMUSCULAR ONCE
Status: DISCONTINUED | OUTPATIENT
Start: 2020-01-01 | End: 2020-01-01

## 2020-01-01 RX ORDER — FACIAL-BODY WIPES
10 EACH TOPICAL
Status: DISCONTINUED | OUTPATIENT
Start: 2020-01-01 | End: 2020-01-01 | Stop reason: HOSPADM

## 2020-01-01 RX ORDER — SODIUM CHLORIDE, SODIUM LACTATE, POTASSIUM CHLORIDE, CALCIUM CHLORIDE 600; 310; 30; 20 MG/100ML; MG/100ML; MG/100ML; MG/100ML
INJECTION, SOLUTION INTRAVENOUS
Status: DISCONTINUED | OUTPATIENT
Start: 2020-01-01 | End: 2020-01-01 | Stop reason: HOSPADM

## 2020-01-01 RX ORDER — LIDOCAINE HYDROCHLORIDE 10 MG/ML
0.1 INJECTION, SOLUTION EPIDURAL; INFILTRATION; INTRACAUDAL; PERINEURAL AS NEEDED
Status: DISCONTINUED | OUTPATIENT
Start: 2020-01-01 | End: 2020-01-01 | Stop reason: HOSPADM

## 2020-01-01 RX ORDER — LORATADINE 10 MG/1
10 TABLET ORAL DAILY
Status: DISCONTINUED | OUTPATIENT
Start: 2020-01-01 | End: 2020-01-01

## 2020-01-01 RX ORDER — ACETAMINOPHEN 325 MG/1
650 TABLET ORAL ONCE
Status: DISCONTINUED | OUTPATIENT
Start: 2020-01-01 | End: 2020-01-01 | Stop reason: HOSPADM

## 2020-01-01 RX ORDER — SODIUM CHLORIDE 0.9 % (FLUSH) 0.9 %
5-40 SYRINGE (ML) INJECTION EVERY 8 HOURS
Status: DISCONTINUED | OUTPATIENT
Start: 2020-01-01 | End: 2020-01-01 | Stop reason: HOSPADM

## 2020-01-01 RX ORDER — LORAZEPAM 2 MG/ML
2 INJECTION INTRAMUSCULAR
Status: DISCONTINUED | OUTPATIENT
Start: 2020-01-01 | End: 2020-01-01

## 2020-01-01 RX ORDER — MORPHINE SULFATE 10 MG/ML
2 INJECTION, SOLUTION INTRAMUSCULAR; INTRAVENOUS
Status: DISCONTINUED | OUTPATIENT
Start: 2020-01-01 | End: 2020-01-01 | Stop reason: HOSPADM

## 2020-01-01 RX ORDER — ALBUMIN HUMAN 50 G/1000ML
25 SOLUTION INTRAVENOUS ONCE
Status: COMPLETED | OUTPATIENT
Start: 2020-01-01 | End: 2020-01-01

## 2020-01-01 RX ORDER — SODIUM CHLORIDE 9 MG/ML
25 INJECTION, SOLUTION INTRAVENOUS CONTINUOUS
Status: DISCONTINUED | OUTPATIENT
Start: 2020-01-01 | End: 2020-01-01 | Stop reason: HOSPADM

## 2020-01-01 RX ORDER — SODIUM CHLORIDE, SODIUM LACTATE, POTASSIUM CHLORIDE, CALCIUM CHLORIDE 600; 310; 30; 20 MG/100ML; MG/100ML; MG/100ML; MG/100ML
500 INJECTION, SOLUTION INTRAVENOUS ONCE
Status: COMPLETED | OUTPATIENT
Start: 2020-01-01 | End: 2020-01-01

## 2020-01-01 RX ORDER — MORPHINE SULFATE 2 MG/ML
2 INJECTION, SOLUTION INTRAMUSCULAR; INTRAVENOUS
Status: DISCONTINUED | OUTPATIENT
Start: 2020-01-01 | End: 2020-01-01 | Stop reason: ALTCHOICE

## 2020-01-01 RX ORDER — DOCUSATE SODIUM 50 MG/5ML
100 LIQUID ORAL DAILY
Status: DISCONTINUED | OUTPATIENT
Start: 2020-01-01 | End: 2020-01-01 | Stop reason: HOSPADM

## 2020-01-01 RX ORDER — QUETIAPINE FUMARATE 25 MG/1
25 TABLET, FILM COATED ORAL 2 TIMES DAILY
COMMUNITY

## 2020-01-01 RX ORDER — SODIUM CHLORIDE, SODIUM LACTATE, POTASSIUM CHLORIDE, CALCIUM CHLORIDE 600; 310; 30; 20 MG/100ML; MG/100ML; MG/100ML; MG/100ML
100 INJECTION, SOLUTION INTRAVENOUS CONTINUOUS
Status: DISCONTINUED | OUTPATIENT
Start: 2020-01-01 | End: 2020-01-01 | Stop reason: HOSPADM

## 2020-01-01 RX ORDER — SODIUM,POTASSIUM PHOSPHATES 280-250MG
2 POWDER IN PACKET (EA) ORAL 2 TIMES DAILY
Status: COMPLETED | OUTPATIENT
Start: 2020-01-01 | End: 2020-01-01

## 2020-01-01 RX ORDER — EPHEDRINE SULFATE/0.9% NACL/PF 50 MG/5 ML
5 SYRINGE (ML) INTRAVENOUS AS NEEDED
Status: DISCONTINUED | OUTPATIENT
Start: 2020-01-01 | End: 2020-01-01 | Stop reason: HOSPADM

## 2020-01-01 RX ORDER — CEFAZOLIN SODIUM 1 G/3ML
INJECTION, POWDER, FOR SOLUTION INTRAMUSCULAR; INTRAVENOUS AS NEEDED
Status: DISCONTINUED | OUTPATIENT
Start: 2020-01-01 | End: 2020-01-01 | Stop reason: HOSPADM

## 2020-01-01 RX ORDER — LACOSAMIDE 50 MG/1
100 TABLET ORAL EVERY 12 HOURS
Status: DISCONTINUED | OUTPATIENT
Start: 2020-01-01 | End: 2020-01-01

## 2020-01-01 RX ORDER — LABETALOL HYDROCHLORIDE 5 MG/ML
10 INJECTION, SOLUTION INTRAVENOUS
Status: DISCONTINUED | OUTPATIENT
Start: 2020-01-01 | End: 2020-01-01

## 2020-01-01 RX ORDER — GLYCOPYRROLATE 0.2 MG/ML
0.2 INJECTION INTRAMUSCULAR; INTRAVENOUS
Status: DISCONTINUED | OUTPATIENT
Start: 2020-01-01 | End: 2020-01-01 | Stop reason: HOSPADM

## 2020-01-01 RX ORDER — MICONAZOLE NITRATE 2 %
POWDER (GRAM) TOPICAL 2 TIMES DAILY
Status: DISCONTINUED | OUTPATIENT
Start: 2020-01-01 | End: 2020-01-01 | Stop reason: HOSPADM

## 2020-01-01 RX ORDER — ROPIVACAINE HYDROCHLORIDE 5 MG/ML
150 INJECTION, SOLUTION EPIDURAL; INFILTRATION; PERINEURAL AS NEEDED
Status: DISCONTINUED | OUTPATIENT
Start: 2020-01-01 | End: 2020-01-01 | Stop reason: HOSPADM

## 2020-01-01 RX ORDER — FAMOTIDINE 40 MG/5ML
20 POWDER, FOR SUSPENSION ORAL 2 TIMES DAILY
Status: DISCONTINUED | OUTPATIENT
Start: 2020-01-01 | End: 2020-01-01 | Stop reason: HOSPADM

## 2020-01-01 RX ORDER — FENTANYL CITRATE 50 UG/ML
50 INJECTION, SOLUTION INTRAMUSCULAR; INTRAVENOUS AS NEEDED
Status: DISCONTINUED | OUTPATIENT
Start: 2020-01-01 | End: 2020-01-01 | Stop reason: HOSPADM

## 2020-01-01 RX ORDER — LORAZEPAM 2 MG/ML
2 INJECTION INTRAMUSCULAR
Status: COMPLETED | OUTPATIENT
Start: 2020-01-01 | End: 2020-01-01

## 2020-01-01 RX ORDER — HYDRALAZINE HYDROCHLORIDE 20 MG/ML
10 INJECTION INTRAMUSCULAR; INTRAVENOUS
Status: DISCONTINUED | OUTPATIENT
Start: 2020-01-01 | End: 2020-01-01

## 2020-01-01 RX ORDER — LORAZEPAM 2 MG/ML
1 INJECTION INTRAMUSCULAR EVERY 4 HOURS
Status: DISCONTINUED | OUTPATIENT
Start: 2020-01-01 | End: 2020-01-01 | Stop reason: HOSPADM

## 2020-01-01 RX ORDER — LORAZEPAM 2 MG/ML
1 INJECTION INTRAMUSCULAR ONCE
Status: COMPLETED | OUTPATIENT
Start: 2020-01-01 | End: 2020-01-01

## 2020-01-01 RX ORDER — PROPOFOL 10 MG/ML
INJECTION, EMULSION INTRAVENOUS AS NEEDED
Status: DISCONTINUED | OUTPATIENT
Start: 2020-01-01 | End: 2020-01-01 | Stop reason: HOSPADM

## 2020-01-01 RX ORDER — HYDROCODONE BITARTRATE AND ACETAMINOPHEN 5; 325 MG/1; MG/1
1 TABLET ORAL
Status: DISCONTINUED | OUTPATIENT
Start: 2020-01-01 | End: 2020-01-01

## 2020-01-01 RX ORDER — MAGNESIUM SULFATE 1 G/100ML
1 INJECTION INTRAVENOUS ONCE
Status: COMPLETED | OUTPATIENT
Start: 2020-01-01 | End: 2020-01-01

## 2020-01-01 RX ORDER — TRAZODONE HYDROCHLORIDE 50 MG/1
50 TABLET ORAL
Status: DISCONTINUED | OUTPATIENT
Start: 2020-01-01 | End: 2020-01-01 | Stop reason: HOSPADM

## 2020-01-01 RX ORDER — ONDANSETRON 2 MG/ML
4 INJECTION INTRAMUSCULAR; INTRAVENOUS
Status: DISCONTINUED | OUTPATIENT
Start: 2020-01-01 | End: 2020-01-01 | Stop reason: HOSPADM

## 2020-01-01 RX ORDER — QUETIAPINE FUMARATE 25 MG/1
25 TABLET, FILM COATED ORAL 2 TIMES DAILY
Status: DISCONTINUED | OUTPATIENT
Start: 2020-01-01 | End: 2020-01-01

## 2020-01-01 RX ORDER — ALBUMIN HUMAN 50 G/1000ML
SOLUTION INTRAVENOUS
Status: COMPLETED
Start: 2020-01-01 | End: 2020-01-01

## 2020-01-01 RX ORDER — LIDOCAINE HYDROCHLORIDE 20 MG/ML
INJECTION, SOLUTION EPIDURAL; INFILTRATION; INTRACAUDAL; PERINEURAL AS NEEDED
Status: DISCONTINUED | OUTPATIENT
Start: 2020-01-01 | End: 2020-01-01 | Stop reason: HOSPADM

## 2020-01-01 RX ORDER — ONDANSETRON 2 MG/ML
INJECTION INTRAMUSCULAR; INTRAVENOUS AS NEEDED
Status: DISCONTINUED | OUTPATIENT
Start: 2020-01-01 | End: 2020-01-01 | Stop reason: HOSPADM

## 2020-01-01 RX ORDER — FENTANYL CITRATE 50 UG/ML
25 INJECTION, SOLUTION INTRAMUSCULAR; INTRAVENOUS
Status: DISCONTINUED | OUTPATIENT
Start: 2020-01-01 | End: 2020-01-01 | Stop reason: HOSPADM

## 2020-01-01 RX ORDER — FACIAL-BODY WIPES
10 EACH TOPICAL DAILY PRN
Status: DISCONTINUED | OUTPATIENT
Start: 2020-01-01 | End: 2020-01-01 | Stop reason: HOSPADM

## 2020-01-01 RX ORDER — LACOSAMIDE 50 MG/1
50 TABLET ORAL EVERY 12 HOURS
Status: COMPLETED | OUTPATIENT
Start: 2020-01-01 | End: 2020-01-01

## 2020-01-01 RX ORDER — NALOXONE HYDROCHLORIDE 0.4 MG/ML
0.4 INJECTION, SOLUTION INTRAMUSCULAR; INTRAVENOUS; SUBCUTANEOUS AS NEEDED
Status: DISCONTINUED | OUTPATIENT
Start: 2020-01-01 | End: 2020-01-01 | Stop reason: HOSPADM

## 2020-01-01 RX ORDER — CEFAZOLIN SODIUM/WATER 2 G/20 ML
2 SYRINGE (ML) INTRAVENOUS EVERY 8 HOURS
Status: COMPLETED | OUTPATIENT
Start: 2020-01-01 | End: 2020-01-01

## 2020-01-01 RX ORDER — LEVETIRACETAM 500 MG/1
1000 TABLET ORAL 2 TIMES DAILY
Status: DISCONTINUED | OUTPATIENT
Start: 2020-01-01 | End: 2020-01-01

## 2020-01-01 RX ORDER — TRAZODONE HYDROCHLORIDE 50 MG/1
50 TABLET ORAL EVERY EVENING
Status: DISCONTINUED | OUTPATIENT
Start: 2020-01-01 | End: 2020-01-01

## 2020-01-01 RX ORDER — LEVOTHYROXINE SODIUM 75 UG/1
75 TABLET ORAL
Status: DISCONTINUED | OUTPATIENT
Start: 2020-01-01 | End: 2020-01-01

## 2020-01-01 RX ORDER — HALOPERIDOL 5 MG/ML
INJECTION INTRAMUSCULAR
Status: DISPENSED
Start: 2020-01-01 | End: 2020-01-01

## 2020-01-01 RX ORDER — SODIUM CHLORIDE, SODIUM LACTATE, POTASSIUM CHLORIDE, CALCIUM CHLORIDE 600; 310; 30; 20 MG/100ML; MG/100ML; MG/100ML; MG/100ML
1000 INJECTION, SOLUTION INTRAVENOUS CONTINUOUS
Status: DISCONTINUED | OUTPATIENT
Start: 2020-01-01 | End: 2020-01-01 | Stop reason: HOSPADM

## 2020-01-01 RX ORDER — ONDANSETRON 2 MG/ML
4 INJECTION INTRAMUSCULAR; INTRAVENOUS
Status: DISCONTINUED | OUTPATIENT
Start: 2020-01-01 | End: 2020-01-01 | Stop reason: SDUPTHER

## 2020-01-01 RX ORDER — ROCURONIUM BROMIDE 10 MG/ML
INJECTION, SOLUTION INTRAVENOUS AS NEEDED
Status: DISCONTINUED | OUTPATIENT
Start: 2020-01-01 | End: 2020-01-01 | Stop reason: HOSPADM

## 2020-01-01 RX ORDER — LORAZEPAM 2 MG/ML
1 INJECTION INTRAMUSCULAR EVERY 6 HOURS
Status: DISCONTINUED | OUTPATIENT
Start: 2020-01-01 | End: 2020-01-01

## 2020-01-01 RX ORDER — HALOPERIDOL 5 MG/ML
2 INJECTION INTRAMUSCULAR ONCE
Status: COMPLETED | OUTPATIENT
Start: 2020-01-01 | End: 2020-01-01

## 2020-01-01 RX ORDER — QUETIAPINE FUMARATE 25 MG/1
25 TABLET, FILM COATED ORAL
Status: DISCONTINUED | OUTPATIENT
Start: 2020-01-01 | End: 2020-01-01 | Stop reason: HOSPADM

## 2020-01-01 RX ORDER — LORAZEPAM 2 MG/ML
INJECTION INTRAMUSCULAR
Status: DISPENSED
Start: 2020-01-01 | End: 2020-01-01

## 2020-01-01 RX ORDER — FENTANYL CITRATE 50 UG/ML
25-50 INJECTION, SOLUTION INTRAMUSCULAR; INTRAVENOUS
Status: DISCONTINUED | OUTPATIENT
Start: 2020-01-01 | End: 2020-01-01

## 2020-01-01 RX ORDER — QUETIAPINE FUMARATE 25 MG/1
12.5 TABLET, FILM COATED ORAL DAILY
Status: DISCONTINUED | OUTPATIENT
Start: 2020-01-01 | End: 2020-01-01 | Stop reason: HOSPADM

## 2020-01-01 RX ORDER — POTASSIUM CHLORIDE 29.8 MG/ML
20 INJECTION INTRAVENOUS
Status: COMPLETED | OUTPATIENT
Start: 2020-01-01 | End: 2020-01-01

## 2020-01-01 RX ORDER — SCOLOPAMINE TRANSDERMAL SYSTEM 1 MG/1
1 PATCH, EXTENDED RELEASE TRANSDERMAL
Status: DISCONTINUED | OUTPATIENT
Start: 2020-01-01 | End: 2020-01-01 | Stop reason: HOSPADM

## 2020-01-01 RX ORDER — SENNOSIDES 8.6 MG/1
2 TABLET ORAL DAILY
Status: DISCONTINUED | OUTPATIENT
Start: 2020-01-01 | End: 2020-01-01 | Stop reason: HOSPADM

## 2020-01-01 RX ORDER — DEXAMETHASONE SODIUM PHOSPHATE 4 MG/ML
INJECTION, SOLUTION INTRA-ARTICULAR; INTRALESIONAL; INTRAMUSCULAR; INTRAVENOUS; SOFT TISSUE AS NEEDED
Status: DISCONTINUED | OUTPATIENT
Start: 2020-01-01 | End: 2020-01-01 | Stop reason: HOSPADM

## 2020-01-01 RX ORDER — HYDROMORPHONE HYDROCHLORIDE 1 MG/ML
0.2 INJECTION, SOLUTION INTRAMUSCULAR; INTRAVENOUS; SUBCUTANEOUS
Status: ACTIVE | OUTPATIENT
Start: 2020-01-01 | End: 2020-01-01

## 2020-01-01 RX ORDER — ACETAMINOPHEN 650 MG/1
650 SUPPOSITORY RECTAL
Status: DISCONTINUED | OUTPATIENT
Start: 2020-01-01 | End: 2020-01-01 | Stop reason: HOSPADM

## 2020-01-01 RX ORDER — KETOROLAC TROMETHAMINE 30 MG/ML
15 INJECTION, SOLUTION INTRAMUSCULAR; INTRAVENOUS
Status: DISCONTINUED | OUTPATIENT
Start: 2020-01-01 | End: 2020-01-01 | Stop reason: HOSPADM

## 2020-01-01 RX ORDER — DIPHENHYDRAMINE HYDROCHLORIDE 50 MG/ML
12.5 INJECTION, SOLUTION INTRAMUSCULAR; INTRAVENOUS AS NEEDED
Status: ACTIVE | OUTPATIENT
Start: 2020-01-01 | End: 2020-01-01

## 2020-01-01 RX ORDER — LIDOCAINE HYDROCHLORIDE AND EPINEPHRINE 10; 10 MG/ML; UG/ML
INJECTION, SOLUTION INFILTRATION; PERINEURAL AS NEEDED
Status: DISCONTINUED | OUTPATIENT
Start: 2020-01-01 | End: 2020-01-01 | Stop reason: HOSPADM

## 2020-01-01 RX ORDER — LORAZEPAM 2 MG/ML
2 INJECTION INTRAMUSCULAR
Status: DISCONTINUED | OUTPATIENT
Start: 2020-01-01 | End: 2020-01-01 | Stop reason: HOSPADM

## 2020-01-01 RX ORDER — SODIUM CHLORIDE 0.9 % (FLUSH) 0.9 %
10 SYRINGE (ML) INJECTION
Status: COMPLETED | OUTPATIENT
Start: 2020-01-01 | End: 2020-01-01

## 2020-01-01 RX ORDER — SODIUM CHLORIDE 9 MG/ML
1000 INJECTION, SOLUTION INTRAVENOUS ONCE
Status: COMPLETED | OUTPATIENT
Start: 2020-01-01 | End: 2020-01-01

## 2020-01-01 RX ORDER — VALPROIC ACID 250 MG/5ML
500 SOLUTION ORAL EVERY 8 HOURS
Status: DISCONTINUED | OUTPATIENT
Start: 2020-01-01 | End: 2020-01-01 | Stop reason: HOSPADM

## 2020-01-01 RX ORDER — DOCUSATE SODIUM 100 MG/1
100 CAPSULE, LIQUID FILLED ORAL 2 TIMES DAILY
Status: DISCONTINUED | OUTPATIENT
Start: 2020-01-01 | End: 2020-01-01

## 2020-01-01 RX ORDER — GLYCOPYRROLATE 0.2 MG/ML
0.2 INJECTION INTRAMUSCULAR; INTRAVENOUS
Status: DISCONTINUED | OUTPATIENT
Start: 2020-01-01 | End: 2020-01-01

## 2020-01-01 RX ORDER — LORAZEPAM 2 MG/ML
1 CONCENTRATE ORAL
COMMUNITY

## 2020-01-01 RX ORDER — HYDROMORPHONE HYDROCHLORIDE 1 MG/ML
0.5 INJECTION, SOLUTION INTRAMUSCULAR; INTRAVENOUS; SUBCUTANEOUS
Status: DISCONTINUED | OUTPATIENT
Start: 2020-01-01 | End: 2020-01-01 | Stop reason: ALTCHOICE

## 2020-01-01 RX ORDER — PROPOFOL 10 MG/ML
INJECTION, EMULSION INTRAVENOUS
Status: DISCONTINUED | OUTPATIENT
Start: 2020-01-01 | End: 2020-01-01 | Stop reason: HOSPADM

## 2020-01-01 RX ORDER — HYDROXYZINE HYDROCHLORIDE 10 MG/5ML
25 SYRUP ORAL
Status: DISCONTINUED | OUTPATIENT
Start: 2020-01-01 | End: 2020-01-01

## 2020-01-01 RX ORDER — QUETIAPINE FUMARATE 25 MG/1
25 TABLET, FILM COATED ORAL 2 TIMES DAILY
Status: DISCONTINUED | OUTPATIENT
Start: 2020-01-01 | End: 2020-01-01 | Stop reason: SDUPTHER

## 2020-01-01 RX ORDER — POTASSIUM CHLORIDE 14.9 MG/ML
10 INJECTION INTRAVENOUS EVERY 4 HOURS
Status: COMPLETED | OUTPATIENT
Start: 2020-01-01 | End: 2020-01-01

## 2020-01-01 RX ORDER — OXYCODONE HYDROCHLORIDE 5 MG/1
5 TABLET ORAL AS NEEDED
Status: DISCONTINUED | OUTPATIENT
Start: 2020-01-01 | End: 2020-01-01 | Stop reason: HOSPADM

## 2020-01-01 RX ORDER — SODIUM CHLORIDE 9 MG/ML
75 INJECTION, SOLUTION INTRAVENOUS CONTINUOUS
Status: DISCONTINUED | OUTPATIENT
Start: 2020-01-01 | End: 2020-01-01

## 2020-01-01 RX ORDER — QUETIAPINE FUMARATE 25 MG/1
25 TABLET, FILM COATED ORAL ONCE
Status: COMPLETED | OUTPATIENT
Start: 2020-01-01 | End: 2020-01-01

## 2020-01-01 RX ORDER — SUCCINYLCHOLINE CHLORIDE 20 MG/ML
INJECTION INTRAMUSCULAR; INTRAVENOUS AS NEEDED
Status: DISCONTINUED | OUTPATIENT
Start: 2020-01-01 | End: 2020-01-01 | Stop reason: HOSPADM

## 2020-01-01 RX ORDER — LEVETIRACETAM 500 MG/1
500 TABLET ORAL 2 TIMES DAILY
Status: DISCONTINUED | OUTPATIENT
Start: 2020-01-01 | End: 2020-01-01

## 2020-01-01 RX ORDER — LORAZEPAM 2 MG/ML
1 INJECTION INTRAMUSCULAR
Status: DISCONTINUED | OUTPATIENT
Start: 2020-01-01 | End: 2020-01-01 | Stop reason: HOSPADM

## 2020-01-01 RX ORDER — QUETIAPINE FUMARATE 25 MG/1
25 TABLET, FILM COATED ORAL
Status: DISCONTINUED | OUTPATIENT
Start: 2020-01-01 | End: 2020-01-01

## 2020-01-01 RX ADMIN — POTASSIUM & SODIUM PHOSPHATES POWDER PACK 280-160-250 MG 2 PACKET: 280-160-250 PACK at 11:01

## 2020-01-01 RX ADMIN — LORAZEPAM 1 MG: 2 INJECTION INTRAMUSCULAR; INTRAVENOUS at 15:47

## 2020-01-01 RX ADMIN — LORAZEPAM 2 MG: 2 INJECTION, SOLUTION INTRAMUSCULAR; INTRAVENOUS at 00:36

## 2020-01-01 RX ADMIN — LORAZEPAM 1 MG: 2 INJECTION INTRAMUSCULAR; INTRAVENOUS at 00:16

## 2020-01-01 RX ADMIN — ALBUMIN (HUMAN) 12.5 G: 12.5 INJECTION, SOLUTION INTRAVENOUS at 23:30

## 2020-01-01 RX ADMIN — LORAZEPAM 1 MG: 2 INJECTION INTRAMUSCULAR; INTRAVENOUS at 16:30

## 2020-01-01 RX ADMIN — Medication 10 ML: at 22:07

## 2020-01-01 RX ADMIN — SODIUM CHLORIDE, SODIUM LACTATE, POTASSIUM CHLORIDE, AND CALCIUM CHLORIDE 500 ML: 600; 310; 30; 20 INJECTION, SOLUTION INTRAVENOUS at 12:02

## 2020-01-01 RX ADMIN — Medication 10 ML: at 15:57

## 2020-01-01 RX ADMIN — SODIUM CHLORIDE, SODIUM LACTATE, POTASSIUM CHLORIDE, AND CALCIUM CHLORIDE 500 ML: 600; 310; 30; 20 INJECTION, SOLUTION INTRAVENOUS at 14:07

## 2020-01-01 RX ADMIN — LORAZEPAM 1 MG: 2 INJECTION INTRAMUSCULAR; INTRAVENOUS at 20:14

## 2020-01-01 RX ADMIN — LEVETIRACETAM 1500 MG: 100 SOLUTION ORAL at 22:36

## 2020-01-01 RX ADMIN — SODIUM CHLORIDE, SODIUM LACTATE, POTASSIUM CHLORIDE, AND CALCIUM CHLORIDE 500 ML/HR: 600; 310; 30; 20 INJECTION, SOLUTION INTRAVENOUS at 13:00

## 2020-01-01 RX ADMIN — VALPROATE SODIUM 375 MG: 100 INJECTION, SOLUTION INTRAVENOUS at 12:30

## 2020-01-01 RX ADMIN — Medication 10 ML: at 06:11

## 2020-01-01 RX ADMIN — LORAZEPAM 1 MG: 2 INJECTION INTRAMUSCULAR; INTRAVENOUS at 07:59

## 2020-01-01 RX ADMIN — MICONAZOLE NITRATE 2 % TOPICAL POWDER: at 08:34

## 2020-01-01 RX ADMIN — Medication 10 ML: at 14:03

## 2020-01-01 RX ADMIN — Medication 10 ML: at 21:31

## 2020-01-01 RX ADMIN — LEVETIRACETAM 500 MG: 500 TABLET ORAL at 18:32

## 2020-01-01 RX ADMIN — MICONAZOLE NITRATE 2 % TOPICAL POWDER: at 18:00

## 2020-01-01 RX ADMIN — POTASSIUM CHLORIDE 10 MEQ: 200 INJECTION, SOLUTION INTRAVENOUS at 08:34

## 2020-01-01 RX ADMIN — LORAZEPAM 1 MG: 2 INJECTION INTRAMUSCULAR; INTRAVENOUS at 19:33

## 2020-01-01 RX ADMIN — POTASSIUM & SODIUM PHOSPHATES POWDER PACK 280-160-250 MG 2 PACKET: 280-160-250 PACK at 17:44

## 2020-01-01 RX ADMIN — HYDROXYZINE HYDROCHLORIDE 25 MG: 10 SYRUP ORAL at 20:39

## 2020-01-01 RX ADMIN — DEXMEDETOMIDINE HYDROCHLORIDE 0.4 MCG/KG/HR: 100 INJECTION, SOLUTION, CONCENTRATE INTRAVENOUS at 20:51

## 2020-01-01 RX ADMIN — SODIUM CHLORIDE 100 MG: 9 INJECTION, SOLUTION INTRAVENOUS at 21:08

## 2020-01-01 RX ADMIN — LORAZEPAM 1 MG: 2 INJECTION INTRAMUSCULAR; INTRAVENOUS at 23:29

## 2020-01-01 RX ADMIN — SUGAMMADEX 200 MG: 100 INJECTION, SOLUTION INTRAVENOUS at 21:46

## 2020-01-01 RX ADMIN — POTASSIUM CHLORIDE 20 MEQ: 400 INJECTION, SOLUTION INTRAVENOUS at 08:34

## 2020-01-01 RX ADMIN — Medication 10 ML: at 14:00

## 2020-01-01 RX ADMIN — SENNOSIDES 17.2 MG: 8.6 TABLET, FILM COATED ORAL at 10:10

## 2020-01-01 RX ADMIN — Medication 10 ML: at 21:35

## 2020-01-01 RX ADMIN — LORAZEPAM 1 MG: 2 INJECTION INTRAMUSCULAR; INTRAVENOUS at 18:08

## 2020-01-01 RX ADMIN — Medication 10 ML: at 05:33

## 2020-01-01 RX ADMIN — Medication 10 ML: at 05:47

## 2020-01-01 RX ADMIN — LORAZEPAM 2 MG: 2 INJECTION INTRAMUSCULAR; INTRAVENOUS at 16:21

## 2020-01-01 RX ADMIN — Medication 10 ML: at 05:51

## 2020-01-01 RX ADMIN — LORAZEPAM 1 MG: 2 INJECTION INTRAMUSCULAR; INTRAVENOUS at 11:03

## 2020-01-01 RX ADMIN — POTASSIUM CHLORIDE 10 MEQ: 200 INJECTION, SOLUTION INTRAVENOUS at 07:00

## 2020-01-01 RX ADMIN — SODIUM CHLORIDE 1000 ML: 900 INJECTION, SOLUTION INTRAVENOUS at 03:13

## 2020-01-01 RX ADMIN — FAMOTIDINE 20 MG: 10 INJECTION, SOLUTION INTRAVENOUS at 14:13

## 2020-01-01 RX ADMIN — Medication 2 G: at 21:25

## 2020-01-01 RX ADMIN — DEXTROSE MONOHYDRATE 125 ML: 100 INJECTION, SOLUTION INTRAVENOUS at 02:34

## 2020-01-01 RX ADMIN — LEVETIRACETAM 1000 MG: 100 SOLUTION ORAL at 22:11

## 2020-01-01 RX ADMIN — KETOROLAC TROMETHAMINE 15 MG: 30 INJECTION, SOLUTION INTRAMUSCULAR at 18:04

## 2020-01-01 RX ADMIN — SODIUM CHLORIDE 100 MG: 9 INJECTION, SOLUTION INTRAVENOUS at 21:20

## 2020-01-01 RX ADMIN — MORPHINE SULFATE 2 MG: 2 INJECTION, SOLUTION INTRAMUSCULAR; INTRAVENOUS at 08:03

## 2020-01-01 RX ADMIN — MORPHINE SULFATE 2 MG: 2 INJECTION, SOLUTION INTRAMUSCULAR; INTRAVENOUS at 10:05

## 2020-01-01 RX ADMIN — Medication 10 ML: at 13:10

## 2020-01-01 RX ADMIN — POTASSIUM & SODIUM PHOSPHATES POWDER PACK 280-160-250 MG 2 PACKET: 280-160-250 PACK at 11:09

## 2020-01-01 RX ADMIN — MORPHINE SULFATE 2 MG: 2 INJECTION, SOLUTION INTRAMUSCULAR; INTRAVENOUS at 13:13

## 2020-01-01 RX ADMIN — LEVOTHYROXINE SODIUM 75 MCG: 75 TABLET ORAL at 07:00

## 2020-01-01 RX ADMIN — MICONAZOLE NITRATE 2 % TOPICAL POWDER: at 11:00

## 2020-01-01 RX ADMIN — Medication 10 ML: at 14:09

## 2020-01-01 RX ADMIN — LEVOTHYROXINE SODIUM 75 MCG: 75 TABLET ORAL at 06:48

## 2020-01-01 RX ADMIN — QUETIAPINE FUMARATE 25 MG: 25 TABLET ORAL at 18:10

## 2020-01-01 RX ADMIN — VALPROATE SODIUM 375 MG: 100 INJECTION, SOLUTION INTRAVENOUS at 06:43

## 2020-01-01 RX ADMIN — MICONAZOLE NITRATE 2 % TOPICAL POWDER: at 17:10

## 2020-01-01 RX ADMIN — Medication 10 ML: at 22:36

## 2020-01-01 RX ADMIN — LORAZEPAM 1 MG: 2 INJECTION INTRAMUSCULAR; INTRAVENOUS at 07:35

## 2020-01-01 RX ADMIN — VALPROIC ACID 500 MG: 250 SOLUTION ORAL at 21:26

## 2020-01-01 RX ADMIN — SENNOSIDES 17.2 MG: 8.6 TABLET, FILM COATED ORAL at 09:18

## 2020-01-01 RX ADMIN — MICONAZOLE NITRATE 2 % TOPICAL POWDER: at 10:12

## 2020-01-01 RX ADMIN — MICONAZOLE NITRATE 2 % TOPICAL POWDER: at 18:50

## 2020-01-01 RX ADMIN — LACOSAMIDE 100 MG: 50 TABLET, FILM COATED ORAL at 21:06

## 2020-01-01 RX ADMIN — Medication 10 ML: at 06:30

## 2020-01-01 RX ADMIN — FAMOTIDINE 20 MG: 10 INJECTION, SOLUTION INTRAVENOUS at 02:24

## 2020-01-01 RX ADMIN — FAMOTIDINE 20 MG: 40 POWDER, FOR SUSPENSION ORAL at 18:09

## 2020-01-01 RX ADMIN — FAMOTIDINE 20 MG: 10 INJECTION, SOLUTION INTRAVENOUS at 02:15

## 2020-01-01 RX ADMIN — POTASSIUM CHLORIDE 10 MEQ: 200 INJECTION, SOLUTION INTRAVENOUS at 06:28

## 2020-01-01 RX ADMIN — LEVETIRACETAM 500 MG: 100 INJECTION, SOLUTION, CONCENTRATE INTRAVENOUS at 18:48

## 2020-01-01 RX ADMIN — Medication 10 ML: at 18:16

## 2020-01-01 RX ADMIN — GLYCOPYRROLATE 0.2 MG: 0.2 INJECTION, SOLUTION INTRAMUSCULAR; INTRAVENOUS at 09:56

## 2020-01-01 RX ADMIN — LEVOTHYROXINE SODIUM 75 MCG: 75 TABLET ORAL at 07:02

## 2020-01-01 RX ADMIN — FAMOTIDINE 20 MG: 10 INJECTION, SOLUTION INTRAVENOUS at 16:00

## 2020-01-01 RX ADMIN — FAMOTIDINE 20 MG: 40 POWDER, FOR SUSPENSION ORAL at 08:28

## 2020-01-01 RX ADMIN — LACOSAMIDE 100 MG: 50 TABLET, FILM COATED ORAL at 20:44

## 2020-01-01 RX ADMIN — TRAZODONE HYDROCHLORIDE 50 MG: 50 TABLET ORAL at 18:32

## 2020-01-01 RX ADMIN — FAMOTIDINE 20 MG: 10 INJECTION, SOLUTION INTRAVENOUS at 15:57

## 2020-01-01 RX ADMIN — Medication 10 ML: at 21:56

## 2020-01-01 RX ADMIN — LACOSAMIDE 50 MG: 50 TABLET, FILM COATED ORAL at 20:40

## 2020-01-01 RX ADMIN — LORAZEPAM 2 MG: 2 INJECTION INTRAMUSCULAR; INTRAVENOUS at 01:57

## 2020-01-01 RX ADMIN — LEVETIRACETAM 1000 MG: 100 SOLUTION ORAL at 00:58

## 2020-01-01 RX ADMIN — ROCURONIUM BROMIDE 30 MG: 10 SOLUTION INTRAVENOUS at 20:03

## 2020-01-01 RX ADMIN — LORAZEPAM 2 MG: 2 INJECTION INTRAMUSCULAR; INTRAVENOUS at 01:21

## 2020-01-01 RX ADMIN — Medication 10 ML: at 21:02

## 2020-01-01 RX ADMIN — Medication 10 ML: at 05:48

## 2020-01-01 RX ADMIN — QUETIAPINE FUMARATE 25 MG: 25 TABLET ORAL at 17:09

## 2020-01-01 RX ADMIN — LORAZEPAM 1 MG: 2 INJECTION INTRAMUSCULAR; INTRAVENOUS at 02:31

## 2020-01-01 RX ADMIN — FAMOTIDINE 20 MG: 40 POWDER, FOR SUSPENSION ORAL at 09:28

## 2020-01-01 RX ADMIN — Medication 10 ML: at 14:20

## 2020-01-01 RX ADMIN — POTASSIUM CHLORIDE 10 MEQ: 200 INJECTION, SOLUTION INTRAVENOUS at 05:42

## 2020-01-01 RX ADMIN — LEVETIRACETAM 1000 MG: 100 INJECTION, SOLUTION, CONCENTRATE INTRAVENOUS at 22:36

## 2020-01-01 RX ADMIN — Medication 10 ML: at 18:24

## 2020-01-01 RX ADMIN — Medication 10 ML: at 15:49

## 2020-01-01 RX ADMIN — SODIUM CHLORIDE 100 MG: 9 INJECTION, SOLUTION INTRAVENOUS at 08:16

## 2020-01-01 RX ADMIN — MICONAZOLE NITRATE 2 % TOPICAL POWDER: at 09:19

## 2020-01-01 RX ADMIN — LEVETIRACETAM 1500 MG: 100 SOLUTION ORAL at 22:06

## 2020-01-01 RX ADMIN — SODIUM CHLORIDE, POTASSIUM CHLORIDE, SODIUM LACTATE AND CALCIUM CHLORIDE: 600; 310; 30; 20 INJECTION, SOLUTION INTRAVENOUS at 19:39

## 2020-01-01 RX ADMIN — Medication 10 ML: at 05:42

## 2020-01-01 RX ADMIN — GLYCOPYRROLATE 0.2 MG: 0.2 INJECTION, SOLUTION INTRAMUSCULAR; INTRAVENOUS at 17:23

## 2020-01-01 RX ADMIN — Medication 10 ML: at 00:59

## 2020-01-01 RX ADMIN — MORPHINE SULFATE 2 MG: 2 INJECTION, SOLUTION INTRAMUSCULAR; INTRAVENOUS at 20:06

## 2020-01-01 RX ADMIN — FAMOTIDINE 20 MG: 10 INJECTION, SOLUTION INTRAVENOUS at 03:44

## 2020-01-01 RX ADMIN — FAMOTIDINE 20 MG: 40 POWDER, FOR SUSPENSION ORAL at 17:44

## 2020-01-01 RX ADMIN — LORAZEPAM 1 MG: 2 INJECTION INTRAMUSCULAR; INTRAVENOUS at 15:27

## 2020-01-01 RX ADMIN — KETOROLAC TROMETHAMINE 15 MG: 30 INJECTION, SOLUTION INTRAMUSCULAR at 12:12

## 2020-01-01 RX ADMIN — LORAZEPAM 1 MG: 2 INJECTION INTRAMUSCULAR; INTRAVENOUS at 12:30

## 2020-01-01 RX ADMIN — MICONAZOLE NITRATE 2 % TOPICAL POWDER: at 18:15

## 2020-01-01 RX ADMIN — MICONAZOLE NITRATE 2 % TOPICAL POWDER: at 17:43

## 2020-01-01 RX ADMIN — LEVETIRACETAM 1500 MG: 100 INJECTION, SOLUTION INTRAVENOUS at 11:35

## 2020-01-01 RX ADMIN — DOCUSATE SODIUM 100 MG: 100 CAPSULE, LIQUID FILLED ORAL at 18:32

## 2020-01-01 RX ADMIN — LORAZEPAM 1 MG: 2 INJECTION INTRAMUSCULAR; INTRAVENOUS at 04:07

## 2020-01-01 RX ADMIN — Medication 10 ML: at 15:20

## 2020-01-01 RX ADMIN — Medication 10 ML: at 05:30

## 2020-01-01 RX ADMIN — MORPHINE SULFATE 2 MG: 2 INJECTION, SOLUTION INTRAMUSCULAR; INTRAVENOUS at 19:17

## 2020-01-01 RX ADMIN — Medication 10 ML: at 13:16

## 2020-01-01 RX ADMIN — LORAZEPAM 1 MG: 2 INJECTION INTRAMUSCULAR; INTRAVENOUS at 20:33

## 2020-01-01 RX ADMIN — Medication 10 ML: at 13:15

## 2020-01-01 RX ADMIN — SODIUM CHLORIDE 100 MG: 9 INJECTION, SOLUTION INTRAVENOUS at 19:59

## 2020-01-01 RX ADMIN — DEXTROSE MONOHYDRATE 125 ML: 100 INJECTION, SOLUTION INTRAVENOUS at 07:05

## 2020-01-01 RX ADMIN — POTASSIUM CHLORIDE 10 MEQ: 200 INJECTION, SOLUTION INTRAVENOUS at 12:24

## 2020-01-01 RX ADMIN — QUETIAPINE FUMARATE 25 MG: 25 TABLET ORAL at 09:32

## 2020-01-01 RX ADMIN — MICONAZOLE NITRATE 2 % TOPICAL POWDER: at 09:35

## 2020-01-01 RX ADMIN — SODIUM CHLORIDE 100 MG: 9 INJECTION, SOLUTION INTRAVENOUS at 09:51

## 2020-01-01 RX ADMIN — Medication 10 ML: at 21:26

## 2020-01-01 RX ADMIN — Medication 2 G: at 12:11

## 2020-01-01 RX ADMIN — LACOSAMIDE 100 MG: 50 TABLET, FILM COATED ORAL at 08:27

## 2020-01-01 RX ADMIN — GLYCOPYRROLATE 0.2 MG: 0.2 INJECTION, SOLUTION INTRAMUSCULAR; INTRAVENOUS at 20:53

## 2020-01-01 RX ADMIN — Medication 10 ML: at 06:16

## 2020-01-01 RX ADMIN — Medication 10 ML: at 22:46

## 2020-01-01 RX ADMIN — FENTANYL CITRATE 50 MCG: 50 INJECTION, SOLUTION INTRAMUSCULAR; INTRAVENOUS at 19:43

## 2020-01-01 RX ADMIN — LEVETIRACETAM 1000 MG: 100 INJECTION, SOLUTION, CONCENTRATE INTRAVENOUS at 09:18

## 2020-01-01 RX ADMIN — LEVETIRACETAM 1000 MG: 100 INJECTION, SOLUTION, CONCENTRATE INTRAVENOUS at 09:42

## 2020-01-01 RX ADMIN — MICONAZOLE NITRATE 2 % TOPICAL POWDER: at 12:30

## 2020-01-01 RX ADMIN — FAMOTIDINE 20 MG: 10 INJECTION, SOLUTION INTRAVENOUS at 13:50

## 2020-01-01 RX ADMIN — Medication 10 ML: at 06:00

## 2020-01-01 RX ADMIN — LEVETIRACETAM 1000 MG: 100 SOLUTION ORAL at 12:28

## 2020-01-01 RX ADMIN — LORAZEPAM 1 MG: 2 INJECTION INTRAMUSCULAR; INTRAVENOUS at 04:12

## 2020-01-01 RX ADMIN — SODIUM CHLORIDE 500 ML: 900 INJECTION, SOLUTION INTRAVENOUS at 05:57

## 2020-01-01 RX ADMIN — SODIUM CHLORIDE 75 ML/HR: 900 INJECTION, SOLUTION INTRAVENOUS at 12:00

## 2020-01-01 RX ADMIN — CEFAZOLIN 2 G: 330 INJECTION, POWDER, FOR SOLUTION INTRAMUSCULAR; INTRAVENOUS at 19:54

## 2020-01-01 RX ADMIN — FAMOTIDINE 20 MG: 40 POWDER, FOR SUSPENSION ORAL at 10:12

## 2020-01-01 RX ADMIN — HYDROXYZINE HYDROCHLORIDE 25 MG: 10 SYRUP ORAL at 02:20

## 2020-01-01 RX ADMIN — LEVETIRACETAM 500 MG: 100 INJECTION, SOLUTION INTRAVENOUS at 09:58

## 2020-01-01 RX ADMIN — MICONAZOLE NITRATE 2 % TOPICAL POWDER: at 20:17

## 2020-01-01 RX ADMIN — MICONAZOLE NITRATE 2 % TOPICAL POWDER: at 08:47

## 2020-01-01 RX ADMIN — LIDOCAINE HYDROCHLORIDE 60 MG: 20 INJECTION, SOLUTION EPIDURAL; INFILTRATION; INTRACAUDAL; PERINEURAL at 19:43

## 2020-01-01 RX ADMIN — DEXAMETHASONE SODIUM PHOSPHATE 4 MG: 4 INJECTION, SOLUTION INTRAMUSCULAR; INTRAVENOUS at 19:52

## 2020-01-01 RX ADMIN — QUETIAPINE FUMARATE 25 MG: 25 TABLET ORAL at 09:19

## 2020-01-01 RX ADMIN — LORAZEPAM 2 MG: 2 INJECTION INTRAMUSCULAR; INTRAVENOUS at 22:45

## 2020-01-01 RX ADMIN — DOCUSATE SODIUM 100 MG: 50 LIQUID ORAL at 09:30

## 2020-01-01 RX ADMIN — LORAZEPAM 1 MG: 2 INJECTION INTRAMUSCULAR; INTRAVENOUS at 20:06

## 2020-01-01 RX ADMIN — LEVETIRACETAM 1000 MG: 100 SOLUTION ORAL at 23:38

## 2020-01-01 RX ADMIN — VALPROIC ACID 500 MG: 250 SOLUTION ORAL at 13:00

## 2020-01-01 RX ADMIN — POTASSIUM CHLORIDE 10 MEQ: 200 INJECTION, SOLUTION INTRAVENOUS at 09:50

## 2020-01-01 RX ADMIN — VALPROATE SODIUM 375 MG: 100 INJECTION, SOLUTION INTRAVENOUS at 02:26

## 2020-01-01 RX ADMIN — LORAZEPAM 1 MG: 2 INJECTION INTRAMUSCULAR; INTRAVENOUS at 06:42

## 2020-01-01 RX ADMIN — POTASSIUM CHLORIDE 10 MEQ: 200 INJECTION, SOLUTION INTRAVENOUS at 07:11

## 2020-01-01 RX ADMIN — LEVETIRACETAM 500 MG: 100 INJECTION, SOLUTION INTRAVENOUS at 19:50

## 2020-01-01 RX ADMIN — FAMOTIDINE 20 MG: 40 POWDER, FOR SUSPENSION ORAL at 18:07

## 2020-01-01 RX ADMIN — SUCCINYLCHOLINE CHLORIDE 140 MG: 20 INJECTION, SOLUTION INTRAMUSCULAR; INTRAVENOUS at 19:43

## 2020-01-01 RX ADMIN — LEVETIRACETAM 1000 MG: 100 SOLUTION ORAL at 23:51

## 2020-01-01 RX ADMIN — ROCURONIUM BROMIDE 5 MG: 10 SOLUTION INTRAVENOUS at 19:43

## 2020-01-01 RX ADMIN — PROPOFOL 160 MG: 10 INJECTION, EMULSION INTRAVENOUS at 19:43

## 2020-01-01 RX ADMIN — Medication 10 ML: at 16:04

## 2020-01-01 RX ADMIN — LEVETIRACETAM 1500 MG: 100 INJECTION, SOLUTION INTRAVENOUS at 22:07

## 2020-01-01 RX ADMIN — SENNOSIDES 17.2 MG: 8.6 TABLET, FILM COATED ORAL at 08:27

## 2020-01-01 RX ADMIN — SODIUM CHLORIDE 75 ML/HR: 900 INJECTION, SOLUTION INTRAVENOUS at 16:04

## 2020-01-01 RX ADMIN — SODIUM CHLORIDE 75 ML/HR: 900 INJECTION, SOLUTION INTRAVENOUS at 05:31

## 2020-01-01 RX ADMIN — GLYCOPYRROLATE 0.2 MG: 0.2 INJECTION, SOLUTION INTRAMUSCULAR; INTRAVENOUS at 18:19

## 2020-01-01 RX ADMIN — LACOSAMIDE 100 MG: 50 TABLET, FILM COATED ORAL at 08:33

## 2020-01-01 RX ADMIN — SENNOSIDES 17.2 MG: 8.6 TABLET, FILM COATED ORAL at 09:29

## 2020-01-01 RX ADMIN — SODIUM CHLORIDE 75 ML/HR: 900 INJECTION, SOLUTION INTRAVENOUS at 02:00

## 2020-01-01 RX ADMIN — SODIUM CHLORIDE 1000 ML: 900 INJECTION, SOLUTION INTRAVENOUS at 15:17

## 2020-01-01 RX ADMIN — Medication 10 ML: at 16:01

## 2020-01-01 RX ADMIN — LEVETIRACETAM 1000 MG: 100 SOLUTION ORAL at 22:37

## 2020-01-01 RX ADMIN — FAMOTIDINE 20 MG: 40 POWDER, FOR SUSPENSION ORAL at 17:24

## 2020-01-01 RX ADMIN — LEVETIRACETAM 1000 MG: 100 INJECTION, SOLUTION, CONCENTRATE INTRAVENOUS at 22:10

## 2020-01-01 RX ADMIN — SODIUM CHLORIDE 75 ML/HR: 900 INJECTION, SOLUTION INTRAVENOUS at 18:14

## 2020-01-01 RX ADMIN — QUETIAPINE FUMARATE 25 MG: 25 TABLET ORAL at 21:25

## 2020-01-01 RX ADMIN — TRAZODONE HYDROCHLORIDE 50 MG: 50 TABLET ORAL at 04:17

## 2020-01-01 RX ADMIN — QUETIAPINE FUMARATE 25 MG: 25 TABLET ORAL at 21:34

## 2020-01-01 RX ADMIN — ACETAMINOPHEN 650 MG: 650 SOLUTION ORAL at 17:24

## 2020-01-01 RX ADMIN — Medication 10 ML: at 21:25

## 2020-01-01 RX ADMIN — LORAZEPAM 2 MG: 2 INJECTION INTRAMUSCULAR; INTRAVENOUS at 06:37

## 2020-01-01 RX ADMIN — POTASSIUM CHLORIDE 10 MEQ: 200 INJECTION, SOLUTION INTRAVENOUS at 06:48

## 2020-01-01 RX ADMIN — SODIUM CHLORIDE 100 MG: 9 INJECTION, SOLUTION INTRAVENOUS at 09:30

## 2020-01-01 RX ADMIN — LORAZEPAM 1 MG: 2 INJECTION INTRAMUSCULAR; INTRAVENOUS at 23:50

## 2020-01-01 RX ADMIN — Medication 10 ML: at 22:10

## 2020-01-01 RX ADMIN — GLYCOPYRROLATE 0.2 MG: 0.2 INJECTION, SOLUTION INTRAMUSCULAR; INTRAVENOUS at 13:13

## 2020-01-01 RX ADMIN — POTASSIUM BICARBONATE 20 MEQ: 782 TABLET, EFFERVESCENT ORAL at 18:10

## 2020-01-01 RX ADMIN — MORPHINE SULFATE 2 MG: 2 INJECTION, SOLUTION INTRAMUSCULAR; INTRAVENOUS at 15:27

## 2020-01-01 RX ADMIN — QUETIAPINE FUMARATE 25 MG: 25 TABLET ORAL at 00:36

## 2020-01-01 RX ADMIN — MAGNESIUM SULFATE HEPTAHYDRATE 1 G: 1 INJECTION, SOLUTION INTRAVENOUS at 11:51

## 2020-01-01 RX ADMIN — FAMOTIDINE 20 MG: 40 POWDER, FOR SUSPENSION ORAL at 08:33

## 2020-01-01 RX ADMIN — Medication 10 ML: at 22:11

## 2020-01-01 RX ADMIN — LEVOTHYROXINE SODIUM 75 MCG: 75 TABLET ORAL at 06:40

## 2020-01-01 RX ADMIN — LEVETIRACETAM 1500 MG: 100 INJECTION, SOLUTION INTRAVENOUS at 10:58

## 2020-01-01 RX ADMIN — PHENYLEPHRINE HYDROCHLORIDE 120 MCG/MIN: 10 INJECTION INTRAVENOUS at 19:46

## 2020-01-01 RX ADMIN — Medication 2 G: at 04:35

## 2020-01-01 RX ADMIN — POTASSIUM CHLORIDE 10 MEQ: 200 INJECTION, SOLUTION INTRAVENOUS at 08:44

## 2020-01-01 RX ADMIN — TRAZODONE HYDROCHLORIDE 50 MG: 50 TABLET ORAL at 00:58

## 2020-01-01 RX ADMIN — Medication 10 ML: at 06:12

## 2020-01-01 RX ADMIN — LEVETIRACETAM 1000 MG: 100 SOLUTION ORAL at 11:30

## 2020-01-01 RX ADMIN — LEVETIRACETAM 1500 MG: 100 SOLUTION ORAL at 11:01

## 2020-01-01 RX ADMIN — MAGNESIUM SULFATE HEPTAHYDRATE 2 G: 40 INJECTION, SOLUTION INTRAVENOUS at 12:08

## 2020-01-01 RX ADMIN — SODIUM CHLORIDE 2000 MG: 900 INJECTION, SOLUTION INTRAVENOUS at 02:00

## 2020-01-01 RX ADMIN — LEVETIRACETAM 1000 MG: 100 SOLUTION ORAL at 22:00

## 2020-01-01 RX ADMIN — LACOSAMIDE 100 MG: 50 TABLET, FILM COATED ORAL at 08:46

## 2020-01-01 RX ADMIN — IOPAMIDOL 60 ML: 755 INJECTION, SOLUTION INTRAVENOUS at 15:48

## 2020-01-01 RX ADMIN — GLYCOPYRROLATE 0.2 MG: 0.2 INJECTION, SOLUTION INTRAMUSCULAR; INTRAVENOUS at 20:30

## 2020-01-01 RX ADMIN — LORAZEPAM 2 MG: 2 INJECTION INTRAMUSCULAR; INTRAVENOUS at 22:51

## 2020-01-01 RX ADMIN — LEVETIRACETAM 500 MG: 100 INJECTION, SOLUTION INTRAVENOUS at 23:45

## 2020-01-01 RX ADMIN — PROPOFOL 75 MCG/KG/MIN: 10 INJECTION, EMULSION INTRAVENOUS at 19:54

## 2020-01-01 RX ADMIN — FAMOTIDINE 20 MG: 40 POWDER, FOR SUSPENSION ORAL at 09:09

## 2020-01-01 RX ADMIN — QUETIAPINE FUMARATE 25 MG: 25 TABLET ORAL at 09:05

## 2020-01-01 RX ADMIN — FENTANYL CITRATE 25 MCG: 50 INJECTION, SOLUTION INTRAMUSCULAR; INTRAVENOUS at 11:20

## 2020-01-01 RX ADMIN — ONDANSETRON HYDROCHLORIDE 4 MG: 2 INJECTION, SOLUTION INTRAMUSCULAR; INTRAVENOUS at 21:11

## 2020-01-01 RX ADMIN — TRAZODONE HYDROCHLORIDE 50 MG: 50 TABLET ORAL at 21:26

## 2020-01-01 RX ADMIN — MICONAZOLE NITRATE 2 % TOPICAL POWDER: at 17:06

## 2020-01-01 RX ADMIN — LORAZEPAM 1 MG: 2 INJECTION INTRAMUSCULAR; INTRAVENOUS at 12:05

## 2020-01-01 RX ADMIN — POTASSIUM CHLORIDE 20 MEQ: 400 INJECTION, SOLUTION INTRAVENOUS at 06:13

## 2020-01-01 RX ADMIN — QUETIAPINE FUMARATE 12.5 MG: 25 TABLET ORAL at 09:30

## 2020-01-01 RX ADMIN — POTASSIUM BICARBONATE 20 MEQ: 782 TABLET, EFFERVESCENT ORAL at 12:31

## 2020-01-01 RX ADMIN — ALBUMIN HUMAN 12.5 G: 50 SOLUTION INTRAVENOUS at 23:30

## 2020-01-01 RX ADMIN — FAMOTIDINE 20 MG: 40 POWDER, FOR SUSPENSION ORAL at 09:25

## 2020-01-01 RX ADMIN — LEVETIRACETAM 1000 MG: 100 SOLUTION ORAL at 10:02

## 2020-01-01 RX ADMIN — FAMOTIDINE 20 MG: 40 POWDER, FOR SUSPENSION ORAL at 17:09

## 2020-01-01 RX ADMIN — QUETIAPINE FUMARATE 25 MG: 25 TABLET ORAL at 21:26

## 2020-01-01 RX ADMIN — DOCUSATE SODIUM 100 MG: 50 LIQUID ORAL at 09:04

## 2020-01-01 RX ADMIN — VALPROATE SODIUM 375 MG: 100 INJECTION, SOLUTION INTRAVENOUS at 19:33

## 2020-01-01 RX ADMIN — VALPROIC ACID 500 MG: 250 SOLUTION ORAL at 05:27

## 2020-01-01 RX ADMIN — FAMOTIDINE 20 MG: 40 POWDER, FOR SUSPENSION ORAL at 17:06

## 2020-01-01 RX ADMIN — MICONAZOLE NITRATE 2 % TOPICAL POWDER: at 09:30

## 2020-01-01 RX ADMIN — POTASSIUM BICARBONATE 20 MEQ: 782 TABLET, EFFERVESCENT ORAL at 17:44

## 2020-01-01 RX ADMIN — MICONAZOLE NITRATE 2 % TOPICAL POWDER: at 18:16

## 2020-01-01 RX ADMIN — LEVOTHYROXINE SODIUM 75 MCG: 75 TABLET ORAL at 07:03

## 2020-01-01 RX ADMIN — MICONAZOLE NITRATE 2 % TOPICAL POWDER: at 17:44

## 2020-01-01 RX ADMIN — POTASSIUM CHLORIDE 10 MEQ: 200 INJECTION, SOLUTION INTRAVENOUS at 07:15

## 2020-01-01 RX ADMIN — LEVOTHYROXINE SODIUM 75 MCG: 75 TABLET ORAL at 14:07

## 2020-01-01 RX ADMIN — POTASSIUM & SODIUM PHOSPHATES POWDER PACK 280-160-250 MG 2 PACKET: 280-160-250 PACK at 17:49

## 2020-01-01 RX ADMIN — LACOSAMIDE 100 MG: 50 TABLET, FILM COATED ORAL at 20:06

## 2020-01-01 RX ADMIN — SENNOSIDES 17.2 MG: 8.6 TABLET, FILM COATED ORAL at 09:30

## 2020-01-01 RX ADMIN — Medication 10 ML: at 06:09

## 2020-01-01 RX ADMIN — Medication 10 ML: at 05:24

## 2020-01-01 RX ADMIN — LORAZEPAM 2 MG: 2 INJECTION INTRAMUSCULAR; INTRAVENOUS at 05:50

## 2020-01-01 RX ADMIN — LEVETIRACETAM 1000 MG: 100 SOLUTION ORAL at 12:11

## 2020-01-01 RX ADMIN — DOCUSATE SODIUM 100 MG: 50 LIQUID ORAL at 09:18

## 2020-01-01 RX ADMIN — MICONAZOLE NITRATE 2 % TOPICAL POWDER: at 18:23

## 2020-01-01 RX ADMIN — Medication 120 MCG: at 19:46

## 2020-01-01 RX ADMIN — LEVETIRACETAM 1500 MG: 100 SOLUTION ORAL at 11:03

## 2020-01-01 RX ADMIN — LORAZEPAM 1 MG: 2 INJECTION, SOLUTION INTRAMUSCULAR; INTRAVENOUS at 09:06

## 2020-01-01 RX ADMIN — Medication 10 ML: at 05:31

## 2020-01-01 RX ADMIN — MICONAZOLE NITRATE 2 % TOPICAL POWDER: at 08:39

## 2020-01-01 RX ADMIN — POTASSIUM BICARBONATE 20 MEQ: 782 TABLET, EFFERVESCENT ORAL at 10:03

## 2020-01-01 RX ADMIN — Medication 10 ML: at 06:10

## 2020-01-01 RX ADMIN — FAMOTIDINE 20 MG: 10 INJECTION, SOLUTION INTRAVENOUS at 02:25

## 2020-01-01 RX ADMIN — SODIUM CHLORIDE 1000 ML: 900 INJECTION, SOLUTION INTRAVENOUS at 09:07

## 2020-01-01 RX ADMIN — MICONAZOLE NITRATE 2 % TOPICAL POWDER: at 08:17

## 2020-01-01 RX ADMIN — TRAZODONE HYDROCHLORIDE 50 MG: 50 TABLET ORAL at 21:31

## 2020-01-01 RX ADMIN — MORPHINE SULFATE 2 MG: 2 INJECTION, SOLUTION INTRAMUSCULAR; INTRAVENOUS at 11:10

## 2020-01-01 RX ADMIN — Medication 10 ML: at 05:23

## 2020-01-01 RX ADMIN — MICONAZOLE NITRATE 2 % TOPICAL POWDER: at 08:28

## 2020-01-01 RX ADMIN — GLYCOPYRROLATE 0.2 MG: 0.2 INJECTION, SOLUTION INTRAMUSCULAR; INTRAVENOUS at 04:16

## 2020-01-01 RX ADMIN — SENNOSIDES 17.2 MG: 8.6 TABLET, FILM COATED ORAL at 09:05

## 2020-01-01 RX ADMIN — DOCUSATE SODIUM 100 MG: 50 LIQUID ORAL at 08:27

## 2020-01-01 RX ADMIN — DOCUSATE SODIUM 100 MG: 50 LIQUID ORAL at 09:29

## 2020-01-01 RX ADMIN — FAMOTIDINE 20 MG: 40 POWDER, FOR SUSPENSION ORAL at 09:30

## 2020-01-01 RX ADMIN — LEVOTHYROXINE SODIUM 75 MCG: 75 TABLET ORAL at 06:32

## 2020-01-01 RX ADMIN — DOCUSATE SODIUM 100 MG: 50 LIQUID ORAL at 10:12

## 2020-01-01 RX ADMIN — FAMOTIDINE 20 MG: 40 POWDER, FOR SUSPENSION ORAL at 18:22

## 2020-01-01 RX ADMIN — Medication 10 ML: at 15:21

## 2020-01-01 RX ADMIN — LEVETIRACETAM 1000 MG: 100 SOLUTION ORAL at 11:49

## 2020-01-01 RX ADMIN — Medication 10 ML: at 15:58

## 2020-01-01 RX ADMIN — HALOPERIDOL LACTATE 2 MG: 5 INJECTION INTRAMUSCULAR at 18:06

## 2020-01-01 RX ADMIN — MORPHINE SULFATE 2 MG: 2 INJECTION, SOLUTION INTRAMUSCULAR; INTRAVENOUS at 20:53

## 2020-01-01 RX ADMIN — LORAZEPAM 1 MG: 2 INJECTION, SOLUTION INTRAMUSCULAR; INTRAVENOUS at 08:56

## 2020-01-02 PROBLEM — Q90.9 DOWN'S SYNDROME: Chronic | Status: ACTIVE | Noted: 2020-01-01

## 2020-01-02 PROBLEM — S06.5XAA SDH (SUBDURAL HEMATOMA): Status: ACTIVE | Noted: 2020-01-01

## 2020-01-02 PROBLEM — E03.9 ACQUIRED HYPOTHYROIDISM: Chronic | Status: ACTIVE | Noted: 2020-01-01

## 2020-01-02 NOTE — ED NOTES
Patient arrives to ER via EMS coming from home. EMS states that the pt has down syndrome. Patient's family and home healthcare CNA states that since yesterday pt has started to appear lethargic/altered, and have increased fatigue/extremity weakness. EMS states upon arrival pt was leaning on L side and appears to be weaker on L side. Pt is able to move all extremities without difficulty upon arrival to ER. Family states pt appeared to have an abnormal gait at home, which is not at baseline. Pt family states sometimes the patient \"plays games\" when it comes to her symptoms/complaints/medical hx.

## 2020-01-02 NOTE — ED NOTES
TRANSFER - OUT REPORT:    Verbal report given to Nicole Gonzalez RN (name) on Mordechai March  being transferred to [facility] ER   Report consisted of patients Situation, Background, Assessment and   Recommendations(SBAR). Information from the following report(s) SBAR was reviewed with the receiving nurse. Lines:   Peripheral IV 01/02/20 Left Antecubital (Active)   Site Assessment Clean, dry, & intact 1/2/2020  1:33 PM   Phlebitis Assessment 0 1/2/2020  1:33 PM   Infiltration Assessment 0 1/2/2020  1:33 PM   Dressing Status Clean, dry, & intact 1/2/2020  1:33 PM   Dressing Type Tape;Transparent 1/2/2020  1:33 PM   Hub Color/Line Status Pink;Flushed 1/2/2020  1:33 PM       Peripheral IV 01/02/20 Right Hand (Active)   Site Assessment Clean, dry, & intact 1/2/2020  5:00 PM   Phlebitis Assessment 0 1/2/2020  5:00 PM   Infiltration Assessment 0 1/2/2020  5:00 PM   Dressing Status Clean, dry, & intact 1/2/2020  5:00 PM   Dressing Type Tape;Transparent 1/2/2020  5:00 PM   Hub Color/Line Status Blue;Flushed 1/2/2020  5:00 PM        Opportunity for questions and clarification was provided. Patient transported by [address] [facility] transport [facility].

## 2020-01-02 NOTE — ED NOTES
Transport has been made for pt to go to [facility] within the hour.

## 2020-01-02 NOTE — ED PROVIDER NOTES
EMERGENCY DEPARTMENT HISTORY AND PHYSICAL EXAM      Date: 1/2/2020  Patient Name: Chris Manning  Patient Age and Sex: 71 y.o. female    History of Presenting Illness     Chief Complaint   Patient presents with    Fatigue    Altered mental status       History Provided By: Patient    Ability to gather history was limited by: Down syndrome    HPI: Chris Manning, 71 y.o. female with history of Down syndrome, hypothyroidism, brought to the emergency department by her home caretaker for concerns for altered mental status. Since this morning patient has been noted to be generally limp, withdrawn. Having difficulty standing on her feet without significant assistance, which is not normal for her. She has been slumped over to her left at times. She has not complained of any pain. There have been no other notable focal neuro deficits. Her last seen normal was approximately 18 hours ago. The patient herself is unable to provide any meaningful history. Straight was obtained from her caretaker. Pt denies any other alleviating or exacerbating factors. There are no other complaints, changes or physical findings at this time. Past Medical History:   Diagnosis Date    Endocrine disease     hypothyroidism    Psychiatric disorder     Down Syndrome     Past Surgical History:   Procedure Laterality Date    HX CHOLECYSTECTOMY         PCP: Argelia Guevara NP    Past History     Past Medical History:  Past Medical History:   Diagnosis Date    Endocrine disease     hypothyroidism    Psychiatric disorder     Down Syndrome       Past Surgical History:  Past Surgical History:   Procedure Laterality Date    HX CHOLECYSTECTOMY         Family History:  History reviewed. No pertinent family history.     Social History:  Social History     Tobacco Use    Smoking status: Never Smoker    Smokeless tobacco: Never Used   Substance Use Topics    Alcohol use: No    Drug use: No       Allergies:  No Known Allergies    Current Medications:  No current facility-administered medications on file prior to encounter. Current Outpatient Medications on File Prior to Encounter   Medication Sig Dispense Refill    calcium-cholecalciferol, d3, (CALCIUM 600 + D) 600-125 mg-unit tab Take  by mouth.  QUEtiapine (SEROQUEL) 25 mg tablet Take 25 mg by mouth two (2) times a day.  trazodone HCl (TRAZODONE PO) Take  by mouth.  loratadine (CLARITIN) 10 mg tablet Take 1 Tab by mouth daily. 15 Tab 0    aspirin 81 mg chewable tablet Take 81 mg by mouth daily.  levothyroxine (SYNTHROID) 50 mcg tablet Take 50 mcg by mouth Daily (before breakfast).  [DISCONTINUED] albuterol (PROVENTIL HFA, VENTOLIN HFA, PROAIR HFA) 90 mcg/actuation inhaler Take 2 Puffs by inhalation every six (6) hours as needed for Wheezing. 1 Inhaler 0    [DISCONTINUED] methylPREDNISolone (MEDROL, KHANH,) 4 mg tablet As directed 1 Dose Pack 0       Review of Systems   Review of Systems   Constitutional: Positive for fatigue. Negative for fever. Neurological: Positive for weakness ( generalized. No focal weakness). Negative for seizures, facial asymmetry and headaches. All other systems reviewed and are negative. Physical Exam   Vital Signs  Patient Vitals for the past 24 hrs:   Temp Pulse Resp BP SpO2   01/02/20 1855 98.7 °F (37.1 °C) 77 15 120/90 95 %   01/02/20 1832  78 16 120/52 94 %   01/02/20 1830  74 17 98/47 95 %   01/02/20 1822  77 15  95 %   01/02/20 1745  79 14 119/60    01/02/20 1730  81 13 113/43    01/02/20 1630  82 20 107/86 98 %   01/02/20 1400  78 20 102/59    01/02/20 1330  80 16 106/53 95 %   01/02/20 1307 97.6 °F (36.4 °C) 80 14 101/40 96 %       Physical Exam  Vitals signs and nursing note reviewed. Constitutional:       General: She is not in acute distress. Appearance: She is well-developed. HENT:      Head: Normocephalic and atraumatic. Mouth/Throat:      Mouth: Mucous membranes are dry.    Eyes:      General: Right eye: No discharge. Left eye: No discharge. Extraocular Movements:      Right eye: Abnormal extraocular motion present. Conjunctiva/sclera: Conjunctivae normal.      Comments: Right eye is medially deviated. Sounds as though this ending waxes and wanes chronically. pupils equally round and reactive   Neck:      Musculoskeletal: Normal range of motion and neck supple. Cardiovascular:      Rate and Rhythm: Normal rate and regular rhythm. Heart sounds: Normal heart sounds. No murmur. Pulmonary:      Effort: Pulmonary effort is normal. No respiratory distress. Breath sounds: Normal breath sounds. No wheezing. Abdominal:      General: There is no distension. Palpations: Abdomen is soft. Tenderness: There is no tenderness. Musculoskeletal: Normal range of motion. General: No deformity. Skin:     General: Skin is warm and dry. Findings: No rash. Neurological:      General: No focal deficit present. Mental Status: She is alert and oriented to person, place, and time. Sensory: Sensation is intact. Motor: Motor function is intact. No weakness or abnormal muscle tone. Comments: No focal deficits noted other than medially deviated right eye.  5 out of 5 strength in all 4 extremities. Apparently normal sensation. Psychiatric:         Mood and Affect: Affect is blunt. Behavior: Behavior is slowed and withdrawn. Cognition and Memory: Cognition is impaired.          Diagnostic Study Results   Labs  Recent Results (from the past 24 hour(s))   CBC WITH AUTOMATED DIFF    Collection Time: 01/02/20  2:16 PM   Result Value Ref Range    WBC 6.7 3.6 - 11.0 K/uL    RBC 4.80 3.80 - 5.20 M/uL    HGB 15.3 11.5 - 16.0 g/dL    HCT 46.0 35.0 - 47.0 %    MCV 95.8 80.0 - 99.0 FL    MCH 31.9 26.0 - 34.0 PG    MCHC 33.3 30.0 - 36.5 g/dL    RDW 13.6 11.5 - 14.5 %    PLATELET 686 878 - 952 K/uL    MPV 10.1 8.9 - 12.9 FL    NRBC 0.0 0 PER 100 WBC    ABSOLUTE NRBC 0.00 0.00 - 0.01 K/uL    NEUTROPHILS 79 (H) 32 - 75 %    LYMPHOCYTES 9 (L) 12 - 49 %    MONOCYTES 10 5 - 13 %    EOSINOPHILS 1 0 - 7 %    BASOPHILS 1 0 - 1 %    IMMATURE GRANULOCYTES 0 0.0 - 0.5 %    ABS. NEUTROPHILS 5.2 1.8 - 8.0 K/UL    ABS. LYMPHOCYTES 0.6 (L) 0.8 - 3.5 K/UL    ABS. MONOCYTES 0.7 0.0 - 1.0 K/UL    ABS. EOSINOPHILS 0.1 0.0 - 0.4 K/UL    ABS. BASOPHILS 0.1 0.0 - 0.1 K/UL    ABS. IMM. GRANS. 0.0 0.00 - 0.04 K/UL    DF AUTOMATED      RBC COMMENTS NORMOCYTIC, NORMOCHROMIC     METABOLIC PANEL, COMPREHENSIVE    Collection Time: 01/02/20  2:16 PM   Result Value Ref Range    Sodium 141 136 - 145 mmol/L    Potassium 4.0 3.5 - 5.1 mmol/L    Chloride 105 97 - 108 mmol/L    CO2 29 21 - 32 mmol/L    Anion gap 7 5 - 15 mmol/L    Glucose 104 (H) 65 - 100 mg/dL    BUN 15 6 - 20 MG/DL    Creatinine 0.91 0.55 - 1.02 MG/DL    BUN/Creatinine ratio 16 12 - 20      GFR est AA >60 >60 ml/min/1.73m2    GFR est non-AA >60 >60 ml/min/1.73m2    Calcium 8.9 8.5 - 10.1 MG/DL    Bilirubin, total 0.6 0.2 - 1.0 MG/DL    ALT (SGPT) 27 12 - 78 U/L    AST (SGOT) 26 15 - 37 U/L    Alk.  phosphatase 104 45 - 117 U/L    Protein, total 7.0 6.4 - 8.2 g/dL    Albumin 2.9 (L) 3.5 - 5.0 g/dL    Globulin 4.1 (H) 2.0 - 4.0 g/dL    A-G Ratio 0.7 (L) 1.1 - 2.2     URINALYSIS W/ REFLEX CULTURE    Collection Time: 01/02/20  2:31 PM   Result Value Ref Range    Color YELLOW/STRAW      Appearance CLEAR CLEAR      Specific gravity 1.020 1.003 - 1.030      pH (UA) 7.0 5.0 - 8.0      Protein NEGATIVE  NEG mg/dL    Glucose NEGATIVE  NEG mg/dL    Ketone NEGATIVE  NEG mg/dL    Bilirubin NEGATIVE  NEG      Blood NEGATIVE  NEG      Urobilinogen 1.0 0.2 - 1.0 EU/dL    Nitrites NEGATIVE  NEG      Leukocyte Esterase NEGATIVE  NEG      WBC 0-4 0 - 4 /hpf    RBC 0-5 0 - 5 /hpf    Epithelial cells FEW FEW /lpf    Bacteria NEGATIVE  NEG /hpf    UA:UC IF INDICATED CULTURE NOT INDICATED BY UA RESULT CNI      Hyaline cast 2-5 0 - 5 /lpf Radiologic Studies  CTA HEAD NECK W CONT   Final Result   IMPRESSION:       CTA Head:   1. No evidence of significant stenosis or aneurysm. 2. Redemonstrated bilateral chronic subdural hematomas, left larger than right,   with rightward midline shift and descending transtentorial herniation. CTA Neck:   1. No evidence of significant stenosis. CT HEAD WO CONT   Final Result   IMPRESSION:   1. Predominantly hypodense left cerebral convexity subdural fluid collection   with hyperdense membranes, most consistent with a chronic subdural hematoma. Mass effect results in 10 mm of rightward midline shift and descending   transtentorial herniation with effacement of the suprasellar cistern. No large   acute component is identified within the subdural hematoma. 2. Additional small right cerebral convexity hypodense subdural fluid collection   measuring up to 8 mm in maximal thickness, most consistent with an additional   chronic subdural hematoma. The findings were called to [facility] on 1/2/2020 at 4:11 PM by Dr. Henry Parra. [phone]           CT Results  (Last 48 hours)               01/02/20 1548  CTA HEAD NECK W CONT Final result    Impression:  IMPRESSION:        CTA Head:   1. No evidence of significant stenosis or aneurysm. 2. Redemonstrated bilateral chronic subdural hematomas, left larger than right,   with rightward midline shift and descending transtentorial herniation. CTA Neck:   1. No evidence of significant stenosis. Narrative:  EXAM:  CTA HEAD NECK W CONT       INDICATION:   altered mental status, limp, slumping over, right eye deviated   inward. Limited exam due to Down's Syndrome       COMPARISON:  CT head 1/2/2020. CONTRAST:  60 mL of Isovue-370. TECHNIQUE:  Unenhanced  images were obtained to localize the volume for   acquisition.   Multislice helical axial CT angiography was performed from the   aortic arch to the top of the head during uneventful rapid bolus intravenous   contrast administration. Coronal and sagittal reformations and 3D post   processing was performed. CT dose reduction was achieved through use of a   standardized protocol tailored for this examination and automatic exposure   control for dose modulation. FINDINGS:       CTA Head:   There is no evidence of large vessel occlusion or flow-limiting stenosis of the   intracranial internal carotid, anterior cerebral, and middle cerebral arteries. The anterior communicating artery is patent. There is no evidence of large vessel occlusion or flow-limiting stenosis of the   intracranial vertebral arteries, basilar artery, or posterior cerebral arteries. Diminutive basilar artery on a congenital basis, with fetal origin of the   bilateral posterior cerebral arteries. There is no evidence of aneurysm or vascular malformation. The dural venous   sinuses and deep cerebral venous system are patent. No evidence of abnormal   parenchymal enhancement on delayed phase images. Redemonstrated bilateral   chronic subdural hematomas, left larger than right, with rightward midline shift   and descending transtentorial herniation. CTA NECK:   NASCET method was utilized for calculating stenosis. The aortic arch is unremarkable. The common carotid arteries are normal in   course and caliber bilaterally. There is no evidence of significant stenosis in   the cervical right internal carotid artery. There is no evidence of significant   stenosis in the cervical left internal carotid artery. There is a codominant vertebrobasilar arterial system. The vertebral arteries   and imaged portion of the basilar artery are normal in course, size and contour   without significant stenosis. Visualized soft tissues of the neck are unremarkable. Atrophic thyroid gland. Visualized lung apices are clear. The proximal thoracic esophagus is dilated and   full of debris.  No acute fracture or aggressive osseous lesion. Scoliosis   partially visualized. Multilevel degenerative disc disease in the cervical spine   most advanced at C3-C4 and C5-C6. Multilevel facet arthropathy throughout the   cervical spine. 01/02/20 1548  CT HEAD WO CONT Final result    Impression:  IMPRESSION:   1. Predominantly hypodense left cerebral convexity subdural fluid collection   with hyperdense membranes, most consistent with a chronic subdural hematoma. Mass effect results in 10 mm of rightward midline shift and descending   transtentorial herniation with effacement of the suprasellar cistern. No large   acute component is identified within the subdural hematoma. 2. Additional small right cerebral convexity hypodense subdural fluid collection   measuring up to 8 mm in maximal thickness, most consistent with an additional   chronic subdural hematoma. [facility]11 PM by Dr. Anai Snow. [phone]           Narrative:  EXAM:  CT HEAD WO CONT       INDICATION:   ams       COMPARISON: CT head 1/2/2020. TECHNIQUE: Unenhanced CT of the head was performed using 5 mm images. Brain and   bone windows were generated. CT dose reduction was achieved through use of a   standardized protocol tailored for this examination and automatic exposure   control for dose modulation. FINDINGS:   Predominantly hypodense to the left cerebral convexity subdural fluid collection   measuring up to 2.0 cm in maximal thickness. There are hyperdense septations   noted within the fluid collection, indicating a chronic component. No other   obvious hyperdense components are identified. There is mass effect on the   adjacent brain parenchyma, and resultant 10 mm of rightward midline shift. There   is also descending transtentorial herniation with effacement of the suprasellar   cistern.        There is a small right cerebral convexity hypodense subdural fluid collection   which measures up to 8 mm in maximal thickness along the frontal convexity. There is mild local mass effect on the adjacent brain parenchyma. There is no acute infarct. The paranasal sinuses, mastoid air cells, and middle   ears are clear. The orbital contents are within normal limits. Disconjugate gaze   with medial deviation of the right globe. There are no significant osseous or   extracranial soft tissue lesions. CXR Results  (Last 48 hours)    None          Procedures   CRITICAL CARE (ASAP ONLY)  Performed by: Marielle Rodriguez MD  Authorized by: Marielle Rodriguez MD     Critical care provider statement:     Critical care time (minutes):  40    Critical care was necessary to treat or prevent imminent or life-threatening deterioration of the following conditions:  CNS failure or compromise    Critical care was time spent personally by me on the following activities:  Discussions with consultants, evaluation of patient's response to treatment, examination of patient, ordering and review of laboratory studies, ordering and review of radiographic studies, pulse oximetry and ordering and performing treatments and interventions        Medical Decision Making     Provider Notes (Medical Decision Making):   48-year-old female with generalized fatigue and weakness, unable to stand on her own feet without assistance, since last night. No other focal neurologic deficits. No fevers. Of note her Seroquel dose was recently increased about a week ago. She has dry mucous membranes, appears withdrawn. She has no focal neurologic deficits as best I can tell. She does have medially deviated right eye, which is chronically waxing and waning. Her laboratories and urinalysis are all normal.  Afebrile. Will check CTA to rule out posterior brainstem infarct.      Mac Lewis MD  2:54 PM    CT demonstrates very large subdural hematoma with midline shift and some evidence of herniation. Neurosurgery Dr. Omar Mai was emergently consulted, recommends transfer to [facility], likely needs neurosurgical intervention and decompression. I spoke with the intensivist Dr. Joce Hills, who accepted the patient for ICU transfer at [facility]. She remained hemodynamically stable, alert/arousable, protecting her airway. She was stable for transfer without any additional airway interventions. Family at bedside was informed and is in agreement with this plan. Aleksandar Andersen MD     Consult required? Yes neurosurgery, Dr. Omar Mai      Medications Administered During ED Course:  Medications   0.9% sodium chloride infusion 1,000 mL (0 mL IntraVENous IV Completed 1/2/20 1818)   iopamidol (ISOVUE-370) 76 % injection 100 mL (60 mL IntraVENous Given 1/2/20 1548)   sodium chloride (NS) flush 10 mL (10 mL IntraVENous Given 1/2/20 1549)          Diagnosis and Disposition     Disposition:  Transferred to Another Facility    Clinical Impression:   1. Subdural hematoma ([address]        Attestation:  I personally performed the services described in this documentation on this date 1/2/2020 for patient Brandon Quiñones. Bhavna Maria MD        I was the first provider for this patient on this visit. To the best of my ability I reviewed relevant prior medical records, electrocardiograms, laboratories, and radiologic studies. The patient's presenting problems were discussed, and the patient was in agreement with the care plan formulated and outlined with them. Bhavna Maria MD    Please note that this dictation was completed with Dragon voice recognition software. Quite often unanticipated grammatical, syntax, homophones, and other interpretive errors are inadvertently transcribed by the computer software. Please disregard these errors and excuse any errors that have escaped final proofreading.

## 2020-01-02 NOTE — ED NOTES
Awaiting call from transfer center in regards to what unit and room number pt will be transferred to at [facility].

## 2020-01-02 NOTE — ED NOTES
Bedside verbal report given to [facility] team. Patients brother signed EMTALA transfer form. Copy made of EMTALA and placed in scan box.

## 2020-01-02 NOTE — ANESTHESIA PREPROCEDURE EVALUATION
Relevant Problems   No relevant active problems       Anesthetic History   No history of anesthetic complications            Review of Systems / Medical History  Patient summary reviewed, nursing notes reviewed and pertinent labs reviewed    Pulmonary  Within defined limits                 Neuro/Psych   Within defined limits           Cardiovascular  Within defined limits                     GI/Hepatic/Renal  Within defined limits              Endo/Other      Hypothyroidism       Other Findings   Comments: Down's Sx  Chronic subdural hematoma              Anesthetic Plan    ASA: 4, emergent  Anesthesia type: general          Induction: Intravenous  Anesthetic plan and risks discussed with: Patient and Healthcare power of

## 2020-01-03 NOTE — PROGRESS NOTES
Neurosurgery Progress Note  Cara Sandifer, ACNP-BC  [phone]        Admit Date: 2020   LOS: 1 day        Daily Progress Note: 1/3/2020    POD:1 Day Post-Op    S/P: Procedure(s):  CRANIOTOMY LEFT FRONTAL FOR SUBDURAL HEMATOMA    Subjective: The patient has a history of Down's syndrome. She was brought to the ER by caregivers due to lethargy and confusion. She also has been having balance issues and difficulty standing recently. She was found to have a large chronic SDH on the left with membranes in it. She transferred to [facility] where she was taken to the OR by Dr. Ko Roblero for a craniotomy to evacuate the SDH. This morning she presents in the ICU with a subdural drain in place. She is trying to get up out of bed and is in mitts. They are working on obtaining a sitter for her. Unable to obtain ROS due to lack of verbal skills. Objective:     Vital signs  Temp (24hrs), Av.7 °F (36.5 °C), Min:96.9 °F (36.1 °C), Max:98.7 °F (37.1 °C)   No intake/output data recorded.  1901 -  0700  In: 1975 [I.V.:1675]  Out: 1330 [Urine:1100; Drains:130]    Visit Vitals  /56   Pulse 73   Temp 97.5 °F (36.4 °C)   Resp 14   Wt 61.8 kg (136 lb 3.9 oz)   SpO2 92%   BMI 34.10 kg/m²    O2 Flow Rate (L/min): 2 l/min O2 Device: Room air     Pain control  Pain Assessment  Pain Scale 1: Adult Nonverbal Pain Scale    PT/OT  Gait                 Physical Exam:  Gen:NAD. Neuro: Awake, alert. Orriented x 1. Answers yes to all other questions. Difficulty following commands. Says \"yep\" Unsure of her speech baseline. Affect pleasant. PERRL. Dysconjugate gaze. Face symmetric. Tongue midline. LOVE spontaneously. Difficult to assess strength at this time. Gait deferred. Skin: Scalp dressing with some dried blood on it. LAMAR drain 130 cc sanguineous drainage since surgery.     CT head without contrast on 01/02/20 shows predominantly hypodense left cerebral convexity subdural fluid collection with hyperdense membranes, most consistent with a chronic subdural hematoma. Mass effect results in 10 mm of rightward midline shift and descending transtentorial herniation with effacement of the suprasellar cistern. No large acute component is identified within the subdural hematoma. Additional small right cerebral convexity hypodense subdural fluid collection measuring up to 8 mm in maximal thickness, most consistent with an additional chronic subdural hematoma. CT head without contrast on 01/03/20 shows interval left frontal craniotomy and drainage of large left multiloculated chronic subdural hematoma. Postoperative findings on the left. Residual right frontal/temporal convexity chronic subdural hematoma without definite interval change. Near complete return of central structures to the midline. 24 hour results:    Recent Results (from the past 24 hour(s))   CBC WITH AUTOMATED DIFF    Collection Time: 01/02/20  2:16 PM   Result Value Ref Range    WBC 6.7 3.6 - 11.0 K/uL    RBC 4.80 3.80 - 5.20 M/uL    HGB 15.3 11.5 - 16.0 g/dL    HCT 46.0 35.0 - 47.0 %    MCV 95.8 80.0 - 99.0 FL    MCH 31.9 26.0 - 34.0 PG    MCHC 33.3 30.0 - 36.5 g/dL    RDW 13.6 11.5 - 14.5 %    PLATELET 277 329 - 987 K/uL    MPV 10.1 8.9 - 12.9 FL    NRBC 0.0 0  WBC    ABSOLUTE NRBC 0.00 0.00 - 0.01 K/uL    NEUTROPHILS 79 (H) 32 - 75 %    LYMPHOCYTES 9 (L) 12 - 49 %    MONOCYTES 10 5 - 13 %    EOSINOPHILS 1 0 - 7 %    BASOPHILS 1 0 - 1 %    IMMATURE GRANULOCYTES 0 0.0 - 0.5 %    ABS. NEUTROPHILS 5.2 1.8 - 8.0 K/UL    ABS. LYMPHOCYTES 0.6 (L) 0.8 - 3.5 K/UL    ABS. MONOCYTES 0.7 0.0 - 1.0 K/UL    ABS. EOSINOPHILS 0.1 0.0 - 0.4 K/UL    ABS. BASOPHILS 0.1 0.0 - 0.1 K/UL    ABS. IMM.  GRANS. 0.0 0.00 - 0.04 K/UL    DF AUTOMATED      RBC COMMENTS NORMOCYTIC, NORMOCHROMIC     METABOLIC PANEL, COMPREHENSIVE    Collection Time: 01/02/20  2:16 PM   Result Value Ref Range    Sodium 141 136 - 145 mmol/L    Potassium 4.0 3.5 - 5.1 mmol/L    Chloride 105 97 - 108 mmol/L    CO2 29 21 - 32 mmol/L    Anion gap 7 5 - 15 mmol/L    Glucose 104 (H) 65 - 100 mg/dL    BUN 15 6 - 20 MG/DL    Creatinine 0.91 0.55 - 1.02 MG/DL    BUN/Creatinine ratio 16 12 - 20      GFR est AA >60 >60 ml/min/1.73m2    GFR est non-AA >60 >60 ml/min/1.73m2    Calcium 8.9 8.5 - 10.1 MG/DL    Bilirubin, total 0.6 0.2 - 1.0 MG/DL    ALT (SGPT) 27 12 - 78 U/L    AST (SGOT) 26 15 - 37 U/L    Alk. phosphatase 104 45 - 117 U/L    Protein, total 7.0 6.4 - 8.2 g/dL    Albumin 2.9 (L) 3.5 - 5.0 g/dL    Globulin 4.1 (H) 2.0 - 4.0 g/dL    A-G Ratio 0.7 (L) 1.1 - 2.2     URINALYSIS W/ REFLEX CULTURE    Collection Time: 01/02/20  2:31 PM   Result Value Ref Range    Color YELLOW/STRAW      Appearance CLEAR CLEAR      Specific gravity 1.020 1.003 - 1.030      pH (UA) 7.0 5.0 - 8.0      Protein NEGATIVE  NEG mg/dL    Glucose NEGATIVE  NEG mg/dL    Ketone NEGATIVE  NEG mg/dL    Bilirubin NEGATIVE  NEG      Blood NEGATIVE  NEG      Urobilinogen 1.0 0.2 - 1.0 EU/dL    Nitrites NEGATIVE  NEG      Leukocyte Esterase NEGATIVE  NEG      WBC 0-4 0 - 4 /hpf    RBC 0-5 0 - 5 /hpf    Epithelial cells FEW FEW /lpf    Bacteria NEGATIVE  NEG /hpf    UA:UC IF INDICATED CULTURE NOT INDICATED BY UA RESULT CNI      Hyaline cast 2-5 0 - 5 /lpf   METABOLIC PANEL, COMPREHENSIVE    Collection Time: 01/03/20  5:18 AM   Result Value Ref Range    Sodium 139 136 - 145 mmol/L    Potassium 4.0 3.5 - 5.1 mmol/L    Chloride 107 97 - 108 mmol/L    CO2 24 21 - 32 mmol/L    Anion gap 8 5 - 15 mmol/L    Glucose 119 (H) 65 - 100 mg/dL    BUN 11 6 - 20 MG/DL    Creatinine 0.76 0.55 - 1.02 MG/DL    BUN/Creatinine ratio 14 12 - 20      GFR est AA >60 >60 ml/min/1.73m2    GFR est non-AA >60 >60 ml/min/1.73m2    Calcium 8.0 (L) 8.5 - 10.1 MG/DL    Bilirubin, total 0.6 0.2 - 1.0 MG/DL    ALT (SGPT) 25 12 - 78 U/L    AST (SGOT) 36 15 - 37 U/L    Alk.  phosphatase 91 45 - 117 U/L    Protein, total 6.5 6.4 - 8.2 g/dL    Albumin 2.7 (L) 3.5 - 5.0 g/dL    Globulin 3.8 2.0 - 4.0 g/dL    A-G Ratio 0.7 (L) 1.1 - 2.2     MAGNESIUM    Collection Time: 01/03/20  5:18 AM   Result Value Ref Range    Magnesium 1.9 1.6 - 2.4 mg/dL   PHOSPHORUS    Collection Time: 01/03/20  5:18 AM   Result Value Ref Range    Phosphorus 3.0 2.6 - 4.7 MG/DL   PROTHROMBIN TIME + INR    Collection Time: 01/03/20  5:18 AM   Result Value Ref Range    INR 1.1 0.9 - 1.1      Prothrombin time 11.1 9.0 - 11.1 sec   PTT    Collection Time: 01/03/20  5:18 AM   Result Value Ref Range    aPTT 23.6 22.1 - 32.0 sec    aPTT, therapeutic range     58.0 - 77.0 SECS   CBC WITH AUTOMATED DIFF    Collection Time: 01/03/20  5:18 AM   Result Value Ref Range    WBC 8.0 3.6 - 11.0 K/uL    RBC 4.44 3.80 - 5.20 M/uL    HGB 14.1 11.5 - 16.0 g/dL    HCT 43.1 35.0 - 47.0 %    MCV 97.1 80.0 - 99.0 FL    MCH 31.8 26.0 - 34.0 PG    MCHC 32.7 30.0 - 36.5 g/dL    RDW 13.1 11.5 - 14.5 %    PLATELET 302 019 - 884 K/uL    MPV 10.3 8.9 - 12.9 FL    NRBC 0.0 0  WBC    ABSOLUTE NRBC 0.00 0.00 - 0.01 K/uL    NEUTROPHILS 94 (H) 32 - 75 %    LYMPHOCYTES 4 (L) 12 - 49 %    MONOCYTES 2 (L) 5 - 13 %    EOSINOPHILS 0 0 - 7 %    BASOPHILS 0 0 - 1 %    IMMATURE GRANULOCYTES 0 0.0 - 0.5 %    ABS. NEUTROPHILS 7.5 1.8 - 8.0 K/UL    ABS. LYMPHOCYTES 0.3 (L) 0.8 - 3.5 K/UL    ABS. MONOCYTES 0.2 0.0 - 1.0 K/UL    ABS. EOSINOPHILS 0.0 0.0 - 0.4 K/UL    ABS. BASOPHILS 0.0 0.0 - 0.1 K/UL    ABS. IMM. GRANS. 0.0 0.00 - 0.04 K/UL    DF SMEAR SCANNED      RBC COMMENTS NORMOCYTIC, NORMOCHROMIC            Assessment:     Principal Problem:    SDH (subdural hematoma) (HCC) (1/2/2020)    Active Problems:    Down's syndrome (1/2/2020)      Acquired hypothyroidism (1/2/2020)        Plan:   1. Chronic subdural hematoma   - s/p crani 01/02   - cont LAMAR drain   - neuro checks every 2 hours   - PT/OT/Speech   - Keep in ICU today while LAMAR drain in place   - Cont Keppra   - normalize and mobilize  2. Brain compression   - due to #1   - plans as above  3.  Brenden Worley syndrome   - Sitter PRN   -  Supportive care  4.  Hypothyroidism    - cont levothyroxine from home    Activity: up with assist  DVT ppx: SCDs  Dispo: tbd    Plan d/w Dr. Sloan Norman, NP

## 2020-01-03 NOTE — ROUTINE PROCESS
TRANSFER - IN REPORT:    Verbal report received from Tay RN(name) on Marlena Pate  being received from PACU(unit) for routine post - op      Report consisted of patients Situation, Background, Assessment and   Recommendations(SBAR). Information from the following report(s) SBAR, Kardex, Procedure Summary, Intake/Output, MAR and Recent Results was reviewed with the receiving nurse. Opportunity for questions and clarification was provided. Assessment completed upon patients arrival to unit and care assumed.

## 2020-01-03 NOTE — BRIEF OP NOTE
BRIEF OPERATIVE NOTE    Date of Procedure: 1/2/2020   Preoperative Diagnosis: Left Chronic subdural hematoma  Postoperative Diagnosis: same  Procedure(s):  CRANIOTOMY LEFT FRONTAL FORSUBDURAL HEMATOMA  Surgeon(s) and Role:     * Smith Stroud MD - Primary         Surgical Assistant: Or staff      Anesthesia: General   Estimated Blood Loss: 50  Specimens:   ID Type Source Tests Collected by Time Destination   1 : Subdural Membrane Fresh Brain  Smith Stroud MD 1/2/2020 2049 Pathology      Findings: Large chronic subdural hematoma with multiple membranes   Complications: none  Implants: * No implants in log *

## 2020-01-03 NOTE — PROGRESS NOTES
Occupational therapy 1001 -   01.03.2020    Orders acknowledged and chart reviewed in prep for OT evaluation. Discussed patient case with RN who reports patient is not following commands, however, unsure if this is baseline as patient has Downs Syndrome. RN reporting patient responds better when family is present. Will defer at this time and f/u when family is here in order to determine patient true functional ability. Thank you. Frederic Wang, MS, OTR/L

## 2020-01-03 NOTE — CONSULTS
Neurosurgery  Pt seen and examined, full consult to follow. Down's syndrome, unknown if falls. Not acting right today, leaning more. CT shows large left chronic subdural hematoma with multiple membranes. I discussed situation with brother who is NOK and POA. I discussed the risks of bleeding, rebleeding into the area, infection, injury to the brain including a stroke near or far from where we are working which could lead to weakness on one or both sides of the body, difficulty with speech or memory.  Difficulty or inability to get it all out, the possibility of needing more surgery,The risk of heart attack, stroke, coma and death,   blood clots in legs, urine infection or pneumonia     Will proceed with left sided craniotomy and removal of subdural hematoma    Kalina Sanders MD

## 2020-01-03 NOTE — PROGRESS NOTES
Problem: Mobility Impaired (Adult and Pediatric)  Goal: *Acute Goals and Plan of Care (Insert Text)  Description  FUNCTIONAL STATUS PRIOR TO ADMISSION: Patient was independent for gait without use of DME.    HOME SUPPORT PRIOR TO ADMISSION: The patient lived with her son and daughter in-law. Physical Therapy Goals  Initiated 1/3/2020  1. Patient will move from supine to sit and sit to supine  in bed with modified independence within 7 day(s). 2.  Patient will transfer from bed to chair and chair to bed with modified independence using the least restrictive device within 7 day(s). 3.  Patient will perform sit to stand with modified independence within 7 day(s). 4.  Patient will ambulate with modified independence for 100 feet with the least restrictive device within 7 day(s). 5.  Patient will ascend/descend 1 step with no handrail(s) with modified independence within 7 day(s). Outcome: Progressing Towards Goal  PHYSICAL THERAPY EVALUATION  Patient: Birgit Blanton (47 y.o. female)  Date: 1/3/2020  Primary Diagnosis: SDH (subdural hematoma) ([facility]) [S06.5X9A]  Procedure(s) (LRB):  CRANIOTOMY LEFT FRONTAL FORSUBDURAL HEMATOMA (Left) 1 Day Post-Op   Precautions:          ASSESSMENT  Based on the objective data described below, the patient presents with impulsivity, impaired balance, and decreased awareness of need for safety after admission for a left frontal craniotomy d/t a persistent SDH. She has Down's Syndrome and has an intellectual disability at baseline. Her brother indicates that she is typically independent for mobility but that it varies with patient behavior. Noted strong trendelenburg gait greatly increasing trunk sway and diminishing step length. Gait required up to minimal assist x2 for balance and line management. She required additional time for command following and with diminished results. Moderate cues for direction during gait.  Anticipate good progress towards goals but she remains below her baseline at this time. Her brother recently had back surgery and cannot provide physical assist.    Current Level of Function Impacting Discharge (mobility/balance): minimal assist for bed mobility, minimal x2 for gait      Other factors to consider for discharge: her brother is unable to provide physical assist     Patient will benefit from skilled therapy intervention to address the above noted impairments. PLAN :  Recommendations and Planned Interventions: bed mobility training, transfer training, gait training, and therapeutic exercises      Frequency/Duration: Patient will be followed by physical therapy:  5 times a week to address goals. Recommendation for discharge: (in order for the patient to meet his/her long term goals)  A short IP rehab stay vs home with HHPT services     This discharge recommendation:  Has not yet been discussed the attending provider and/or case management    IF patient discharges home will need the following DME: to be determined (TBD)         SUBJECTIVE:   Patient stated Okay.     OBJECTIVE DATA SUMMARY:   HISTORY:    Past Medical History:   Diagnosis Date    Endocrine disease     hypothyroidism    Psychiatric disorder     Down Syndrome     Past Surgical History:   Procedure Laterality Date    HX CHOLECYSTECTOMY         Personal factors and/or comorbidities impacting plan of care:     Home Situation  Home Environment: Apartment  # Steps to Enter: 1  One/Two Story Residence: One story  Current DME Used/Available at Home: Grab bars, Shower chair, Walker, rolling  Tub or Shower Type: Tub/Shower combination    EXAMINATION/PRESENTATION/DECISION MAKING:   Critical Behavior:  Neurologic State: Alert  Orientation Level: (unable to formally assess 2* DOwn's Syndrome)  Cognition: Decreased attention/concentration, Decreased command following, Impaired decision making, Poor safety awareness, Impulsive  Safety/Judgement: Decreased awareness of environment, Decreased awareness of need for assistance, Decreased awareness of need for safety, Decreased insight into deficits, Fall prevention  Hearing:     Skin:    Edema:   Range Of Motion:  AROM: Generally decreased, functional           PROM: Generally decreased, functional           Strength:    Strength: Generally decreased, functional                    Tone & Sensation:   Tone: Abnormal(low tone throughout)              Sensation: (unable to accurately formally assess)               Coordination:  Coordination: Generally decreased, functional  Vision:   Tracking: Unable to test secondary due to decreased visual attention  Functional Mobility:  Bed Mobility:     Supine to Sit: Minimum assistance; Additional time;Assist x1     Scooting: Minimum assistance; Additional time;Assist x1  Transfers:  Sit to Stand: Minimum assistance; Additional time;Assist x1  Stand to Sit: Minimum assistance; Additional time;Assist x1                       Balance:   Sitting: Impaired  Sitting - Static: Good (unsupported)  Sitting - Dynamic: Fair (occasional)  Standing: Impaired; With support  Standing - Static: Constant support; Fair  Standing - Dynamic : Constant support; Fair  Ambulation/Gait Training:  Distance (ft): 30 Feet (ft)  Assistive Device: Gait belt(bilateral HHA)  Ambulation - Level of Assistance: Minimal assistance;Assist x2     Gait Description (WDL): Exceptions to WDL  Gait Abnormalities: Decreased step clearance;Trendelenburg;Trunk sway increased; Shuffling gait        Base of Support: Widened     Speed/Sharmila: Shuffled       Therapeutic Exercises:       Functional Measure:         Physical Therapy Evaluation Charge Determination   History Examination Presentation Decision-Making   MEDIUM  Complexity : 1-2 comorbidities / personal factors will impact the outcome/ POC  MEDIUM Complexity : 3 Standardized tests and measures addressing body structure, function, activity limitation and / or participation in recreation  MEDIUM Complexity : Evolving with changing characteristics  MEDIUM Complexity : FOTO score of 26-74      Based on the above components, the patient evaluation is determined to be of the following complexity level: MEDIUM    Pain Rating:      Activity Tolerance:   Fair  Please refer to the flowsheet for vital signs taken during this treatment. After treatment patient left in no apparent distress:   Caregiver / family present and sitting on bedside commode with nursing PCT present     COMMUNICATION/EDUCATION:   The patients plan of care was discussed with: Registered Nurse. Fall prevention education was provided and the patient/caregiver indicated understanding., Patient/family have participated as able in goal setting and plan of care. , and Patient/family agree to work toward stated goals and plan of care.     Thank you for this referral.  Axel Clemens, PT, DPT   Time Calculation: 30 mins

## 2020-01-03 NOTE — PROGRESS NOTES
Orders acknowledged and chart reviewed in prep for PT evaluation. Discussed patient case with RN who reports patient is not following commands, however, unsure if this is baseline as patient has Downs Syndrome. RN reporting patient responds better when family is present. Will defer at this time and f/u when family is here in order to determine patient true functional ability. Thank you.      Nuha Morales, PT, DPT

## 2020-01-03 NOTE — ANESTHESIA POSTPROCEDURE EVALUATION
Post-Anesthesia Evaluation and Assessment    Patient: Subhash Hassan MRN: [id number]  SSN: [id number]    YOB: 1955  Age: 71 y.o. Sex: female      I have evaluated the patient and they are stable and ready for discharge from the PACU. Cardiovascular Function/Vital Signs  Visit Vitals  /60   Pulse 61   Temp 36.1 °C (96.9 °F)   Resp 10   SpO2 100%       Patient is status post General anesthesia for Procedure(s):  CRANIOTOMY LEFT FRONTAL FORSUBDURAL HEMATOMA. Nausea/Vomiting: None    Postoperative hydration reviewed and adequate. Pain:      Managed    Neurological Status: At baseline    Mental Status, Level of Consciousness: Alert and  oriented to person, place, and time    Pulmonary Status:   O2 Device: CO2 nasal cannula (01/02/20 2204)   Adequate oxygenation and airway patent    Complications related to anesthesia: None    Post-anesthesia assessment completed. No concerns    Signed By: Primitivo Adam MD     January 2, 2020              Procedure(s):  CRANIOTOMY LEFT FRONTAL FORSUBDURAL HEMATOMA.    general    <BSHSIANPOST>    Vitals Value Taken Time   BP 96/42 1/2/2020 10:30 PM   Temp 36.1 °C (96.9 °F) 1/2/2020 10:04 PM   Pulse 61 1/2/2020 10:33 PM   Resp 8 1/2/2020 10:33 PM   SpO2 100 % 1/2/2020 10:33 PM   Vitals shown include unvalidated device data.

## 2020-01-03 NOTE — PROGRESS NOTES
TRANSFER - OUT REPORT:    Verbal report given to Pattie guadarrama(name) on Mordechai March  being transferred to icu2(unit) for routine progression of care       Report consisted of patients Situation, Background, Assessment and   Recommendations(SBAR). Time Pre op antibiotic given:1955  Anesthesia Stop time: 9641  Riojas Present on Transfer to floor:y  Order for Riojas on Chart:y  Discharge Prescriptions with Chart:n    Information from the following report(s) SBAR, Kardex, OR Summary, Procedure Summary, Intake/Output and MAR was reviewed with the receiving nurse. Opportunity for questions and clarification was provided. Is the patient on 02? YES       L/Min 2       Other     Is the patient on a monitor? YES    Is the nurse transporting with the patient? YES    Surgical Waiting Area notified of patient's transfer from PACU?  YES      The following personal items collected during your admission accompanied patient upon transfer:   Dental Appliance:    Vision:    Hearing Aid:    Jewelry:    Clothing:    Other Valuables:    Valuables sent to safe:      NO BELONGINGS IN PACU

## 2020-01-03 NOTE — PROGRESS NOTES
SLP Contact Note    SLP evaluation complete. Rec mechanically-altered ground diet and thin liquids. Full note to follow.       Thank you,  ELODIA Rosas.Ed, [id number] [facility]  Speech-Language Pathologist

## 2020-01-03 NOTE — PROGRESS NOTES
Problem: Self Care Deficits Care Plan (Adult)  Goal: *Acute Goals and Plan of Care (Insert Text)  Description    FUNCTIONAL STATUS PRIOR TO ADMISSION: patient lives with her brother and sister in law and requires up to max A for ADLs at baseline - brother reports he will hand patient her clothing and she will andrew, except shoes and socks. Patient mostly able to bathe herself with verbal cues and supervision and cant self feed with min A. Brother reports patient able to complete bladder hygiene with setup, however, requires up to max A for bowel hygiene. HOME SUPPORT: The patient lived with brother and JOSEP and requires up to max A for ADLs. Occupational Therapy Goals  Initiated 1/3/2020  1. Patient will perform grooming with minimal assistance/contact guard assist within 7 day(s). 2.  Patient will perform lower body dressing with minimal assistance/contact guard assist within 7 day(s). 3.  Patient will perform bathing with minimal assistance/contact guard assist within 7 day(s). 4.  Patient will perform toilet transfers with supervision/set-up within 7 day(s). 5.  Patient will perform all aspects of toileting with minimal assistance/contact guard assist within 7 day(s). 6.  Patient will participate in upper extremity therapeutic exercise/activities with supervision/set-up for 5 minutes within 7 day(s). 7.  Patient will utilize energy conservation techniques during functional activities with verbal and tactile cues within 7 day(s).         Outcome: Not Met    OCCUPATIONAL THERAPY EVALUATION  Patient: Caridad Fitzpatrick (72 y.o. female)  Date: 1/3/2020  Primary Diagnosis: SDH (subdural hematoma) ([facility]) [S06.5X9A]  Procedure(s) (LRB):  CRANIOTOMY LEFT FRONTAL FORSUBDURAL HEMATOMA (Left) 1 Day Post-Op   Precautions: falls        ASSESSMENT  Based on the objective data described below, the patient presents with limited ADL performance s/p admission for SDH with resulting L frontal craniotomy with evacuation of SDH. Today, patient ADLs limited by baseline Down's syndrome, impaired balance, generalized weakness, decreased functional activity tolerance and limited lower body access. At baseline, patient lives with brother and JOSEP who assist with ADLs as needed. Today, patient completed bed mobility with up to min A. Patient completed functional mobility with min A (HHA x1-2). Patient completed toilet transfer with min A and required up to max A for toileting/pericare. Noted patient standing GIULIANA dropped into the 80s/50s, however, stabilized with mobility. Patient left with PCT who was assisting with toileting. Patient required multimodal cues to follow commands for tasks and responds best to familiar people (brother present during session and provided background/PLOF). Will continue to follow - patient may benefit from short IPR stay pending progress. Current Level of Function Impacting Discharge (ADLs/self-care): up to max A for ADLs    Functional Outcome Measure: The patient scored 40/100 on the Barthel Index outcome measure which is indicative of moderate deficits in ADLs. Other factors to consider for discharge: lives with brother and has good support, however, he cannot provide physical assist having just had back sx     Patient will benefit from skilled therapy intervention to address the above noted impairments. PLAN :  Recommendations and Planned Interventions: self care training, functional mobility training, therapeutic exercise, balance training, therapeutic activities, endurance activities, patient education, home safety training, and family training/education    Frequency/Duration: Patient will be followed by occupational therapy 5 times a week to address goals.     Recommendation for discharge: (in order for the patient to meet his/her long term goals)  To be determined: short IPR vs. Susannah Dubonnet pending progress     This discharge recommendation:  Has not yet been discussed the attending provider and/or case management    IF patient discharges home will need the following DME: patient owns DME required for discharge       SUBJECTIVE:   Patient stated I love you.     OBJECTIVE DATA SUMMARY:   HISTORY:   Past Medical History:   Diagnosis Date    Endocrine disease     hypothyroidism    Psychiatric disorder     Down Syndrome     Past Surgical History:   Procedure Laterality Date    HX CHOLECYSTECTOMY         Expanded or extensive additional review of patient history:     Home Situation  Home Environment: Apartment  # Steps to Enter: 1  One/Two Story Residence: One story  Current DME Used/Available at Home: Grab bars, Shower chair, Walker, rolling  Tub or Shower Type: Tub/Shower combination    Hand dominance: Right    EXAMINATION OF PERFORMANCE DEFICITS:  Cognitive/Behavioral Status:  Neurologic State: Alert  Orientation Level: (unable to formally assess 2* DOwn's Syndrome)  Cognition: Decreased attention/concentration;Decreased command following; Impaired decision making;Poor safety awareness; Impulsive  Perception: Appears intact  Perseveration: Perseverates during mobility(on keeping backside covered)  Safety/Judgement: Decreased awareness of environment;Decreased awareness of need for assistance;Decreased awareness of need for safety;Decreased insight into deficits; Fall prevention    Skin: Appears grossly intact    Edema: none noted in BUEs    Hearing:       Vision/Perceptual:    Tracking: Unable to test secondary due to decreased visual attention      Range of Motion:  In BUEs  AROM: Generally decreased, functional  PROM: Generally decreased, functional    Strength: In BUEs  Strength: Generally decreased, functional    Coordination:  Coordination: Generally decreased, functional  Fine Motor Skills-Upper: Left Impaired;Right Impaired    Gross Motor Skills-Upper: Left Intact; Right Intact    Tone & Sensation:  In BUEs  Tone: Abnormal(low tone throughout)  Sensation: (unable to accurately formally assess)    Balance:  Sitting: Impaired  Sitting - Static: Good (unsupported)  Sitting - Dynamic: Fair (occasional)  Standing: Impaired; With support  Standing - Static: Constant support; Fair  Standing - Dynamic : Constant support; Fair    Functional Mobility and Transfers for ADLs:  Bed Mobility:  Supine to Sit: Minimum assistance; Additional time;Assist x1  Scooting: Minimum assistance; Additional time;Assist x1    Transfers:  Sit to Stand: Minimum assistance; Additional time;Assist x1  Stand to Sit: Minimum assistance; Additional time;Assist x1  Toilet Transfer : Minimum assistance; Additional time;Assist x1  Assistive Device : Gait Belt    ADL Assessment:  Feeding: Minimum assistance(Infer min A to open containers 2* FM dexterity - baseline)    Oral Facial Hygiene/Grooming: Minimum assistance(Infer min A to open containers 2* FM dexterity - baseline)    Bathing: Moderate assistance(Infer per obs of func reach, balance, cog- baseline)    Upper Body Dressing: Minimum assistance(Infer per obs of func reach, BUR ROM, cognition)    Lower Body Dressing: Total assistance(to don socks - baseline)    Toileting: Moderate assistance    ADL Intervention and task modifications:  Upper Body [address]: Moderate assistance(2* lines and leads)  Cues: Physical assistance;Verbal cues provided    Lower Body Dressing Assistance  Socks: Total assistance (dependent)  Leg Crossed Method Used: No  Position Performed: Supine  Cues: Don;Physical assistance;Verbal cues provided    Toileting  Toileting Assistance: Moderate assistance  Bladder Hygiene: Moderate assistance  Bowel Hygiene: Maximum assistance  Clothing Management: Maximum assistance  Cues: Physical assistance for pants down;Physical assistance for pants up; Tactile cues provided;Verbal cues provided    Cognitive Retraining  Safety/Judgement: Decreased awareness of environment;Decreased awareness of need for assistance;Decreased awareness of need for safety;Decreased insight into deficits; Fall prevention    Functional Measure:  Barthel Index:    Bathin  Bladder: 5  Bowels: 5  Groomin  Dressin  Feedin  Mobility: 5  Stairs: 0  Toilet Use: 5  Transfer (Bed to Chair and Back): 10  Total: 40/100        The Barthel ADL Index: Guidelines  1. The index should be used as a record of what a patient does, not as a record of what a patient could do. 2. The main aim is to establish degree of independence from any help, physical or verbal, however minor and for whatever reason. 3. The need for supervision renders the patient not independent. 4. A patient's performance should be established using the best available evidence. Asking the patient, friends/relatives and nurses are the usual sources, but direct observation and common sense are also important. However direct testing is not needed. 5. Usually the patient's performance over the preceding 24-48 hours is important, but occasionally longer periods will be relevant. 6. Middle categories imply that the patient supplies over 50 per cent of the effort. 7. Use of aids to be independent is allowed. Miguelangel Nelson., Barthel, D.W. ([id number]). Functional evaluation: the Barthel Index. [facility] (14)2. Melonie Gatica abdifatah Stephanie, TRISTON, Paco Land., Rin Pathak., Maria A, [address] [place] [address] (). Measuring the change indisability after inpatient rehabilitation; comparison of the responsiveness of the Barthel Index and Functional Lyons Measure. Journal of Neurology, Neurosurgery, and Psychiatry, 66(4), 103-898. Shannan Damian, N.J.A, ANGÉLICA Pryor, & Cherelle Monroy MTHERON. (2004.) Assessment of post-stroke quality of life in cost-effectiveness studies: The usefulness of the Barthel Index and the EuroQoL-5D.  Quality of Life Research, 15, 892-73       Occupational Therapy Evaluation Charge Determination   History Examination Decision-Making   LOW Complexity : Brief history review  MEDIUM Complexity : 3-5 performance deficits relating to physical, cognitive , or psychosocial skils that result in activity limitations and / or participation restrictions HIGH Complexity : Patient presents with comorbidities that affect occupational performance. Signifigant modification of tasks or assistance (eg, physical or verbal) with assessment (s) is necessary to enable patient to complete evaluation       Based on the above components, the patient evaluation is determined to be of the following complexity level: MEDIUM  Pain Rating:  Did not verbalize    Activity Tolerance:   Fair and requires rest breaks  Please refer to the flowsheet for vital signs taken during this treatment. After treatment patient left in no apparent distress:    Call bell within reach, Caregiver / family present, and left with PCT completing toileting     COMMUNICATION/EDUCATION:   The patients plan of care was discussed with: Physical Therapist and Registered Nurse. Home safety education was provided and the patient/caregiver indicated understanding. and Patient/family have participated as able in goal setting and plan of care. This patients plan of care is appropriate for delegation to [facility] [place].     Thank you for this referral.  Lakeshia Bentley OT  Time Calculation: 33 mins

## 2020-01-03 NOTE — OP NOTES
[address]   OPERATIVE REPORT    Name:  Ellyn Barnes  MR#:  [id number]  :  1955  ACCOUNT #:  [de-identified]  DATE OF SERVICE:  2020    PREOPERATIVE DIAGNOSIS:  Left chronic subdural hematoma. POSTOPERATIVE DIAGNOSIS:  Left chronic subdural hematoma. PROCEDURE PERFORMED:  Left craniotomy for evacuation of subdural hematoma. SURGEON:  Jaden Smith MD    ASSISTANT:  First assistant, OR staff. ANESTHESIA:  General endotracheal.    COMPLICATIONS:  None. SPECIMENS REMOVED:  n.    IMPLANTS:  n.    ESTIMATED BLOOD LOSS:  50 mL    INDICATIONS:  The patient is a 43-year-old female with Down syndrome. She has become more lethargic today. She was found to have a large subdural hematoma with multiple membranes, so it was decided to take her to the operating room and decompress her brain. FINDINGS:  Thick membranes with chronic subdural hematoma. 2 g of Ancef was given prior to incision and orders were written to stop within 24 hours. SCD was used for the entire case. PROCEDURE:  The patient was brought to the operating room. After appropriate anesthesia was administered, she was placed in a Leary head merchant. The left side of her head was clipped, prepped and draped in usual sterile fashion. Incision was marked down in hockey stick type incision and infiltrated with 1% lidocaine with 1:100,000 epinephrine. Incision was made using #10 blade and Bovie electrocautery was used to cut through the subcutaneous tissue. The periosteum was taken down using a periosteal elevator and self-retaining retractor was placed in the wound. At this point, a bur hole was made posteriorly using a high-speed drill. A wire pass drill was then used to create a craniotomy. The flap was elevated and protected for the rest of the case. The dura was opened using a 15-blade and with a very thick membrane underneath this.   This dura was then tacked back and the membrane was opened using a 15-blade and copious amounts of chronic subdural fluid came out. It turned out that the membrane was very thick in some places about 2 cm thick. This was coagulated and taken down all the way around. There was a separate subdural frontally, which was opened up and drained as well. Some of this was more subacute blood, but most of this was chronic blood. The membranes were quite vascularized and required to be coagulated all the way around multiple times for hemostasis. After this, the area was irrigated copiously. A 7-mm LAMAR drain was placed in the subdural space. The dura was closed slightly around this. Central tack-up suture was placed and a bone flap was replaced using miniplates with the subdural drain coming out the posterior bur hole. The area was irrigated copiously. Closure was done in anatomical layers. Skin was reapproximated using staples. Sponge and needle counts were correct x2. No complications. Estimated blood loss was 50 mL. Dr. Gilmore Section was present for the entire case from start to finish.         Tracey Stanley MD MM/[email or web address]_I/[email or web address]_P  D:  01/02/2020 22:03  T:  01/03/2020 2:44  JOB #:  [id number]

## 2020-01-03 NOTE — PROGRESS NOTES
[facility]    ICU TEAM Progress Note    Name: Baljit Saldivar   : 1955   MRN: [id number]   Date: 1/3/2020      Subjective:   Progress Note: 1/3/2020      Reason for ICU Admission:   Chronic SDH  Craniotomy for drain SHD  Down Syndrome  Anxiety    Overnight Events: stable, slept; trying to take of drain dressing    POD:1 Day Post-Op    S/P: Procedure(s):  CRANIOTOMY LEFT FRONTAL FORSUBDURAL HEMATOMA    Active Problem List:     Problem List  Date Reviewed: 2020          Codes Class    * (Principal) SDH (subdural hematoma) ([facility] ICD-10-CM: A59.7J5Z  ICD-9-CM: 432.1         Down's syndrome (Chronic) ICD-10-CM: Q90.9  ICD-9-CM: 758.0         Acquired hypothyroidism (Chronic) ICD-10-CM: E03.9  ICD-9-CM: 244.9               Past Medical History:      has a past medical history of Endocrine disease and Psychiatric disorder. Past Surgical History:      has a past surgical history that includes hx cholecystectomy.     Medications:     Current Facility-Administered Medications   Medication Dose Route Frequency    levothyroxine (SYNTHROID) tablet 50 mcg  50 mcg Oral ACB    loratadine (CLARITIN) tablet 10 mg  10 mg Oral DAILY    QUEtiapine (SEROquel) tablet 25 mg  25 mg Oral BID    traZODone (DESYREL) tablet 50 mg  50 mg Oral QPM    sodium chloride (NS) flush 5-40 mL  5-40 mL IntraVENous Q8H    sodium chloride (NS) flush 5-40 mL  5-40 mL IntraVENous PRN    0.9% sodium chloride infusion  75 mL/hr IntraVENous CONTINUOUS    acetaminophen (TYLENOL) solution 650 mg  650 mg Oral Q4H PRN    HYDROcodone-acetaminophen (NORCO) 5-325 mg per tablet 1 Tab  1 Tab Oral Q4H PRN    ceFAZolin (ANCEF) 2 g/20 mL in sterile water IV syringe  2 g IntraVENous Q8H    ondansetron (ZOFRAN) injection 4 mg  4 mg IntraVENous Q4H PRN    niCARdipine (CARDENE) 25 mg in 0.9% sodium chloride 250 mL infusion  5-15 mg/hr IntraVENous TITRATE    labetalol (NORMODYNE;TRANDATE) injection 10 mg  10 mg IntraVENous Q15MIN PRN    Or    hydrALAZINE (APRESOLINE) 20 mg/mL injection 10 mg  10 mg IntraVENous Q15MIN PRN    docusate sodium (COLACE) capsule 100 mg  100 mg Oral BID    fentaNYL citrate (PF) injection 25-50 mcg  25-50 mcg IntraVENous Q1H PRN    levETIRAcetam (KEPPRA) tablet 500 mg  500 mg Oral BID    PHENYLephrine (BRITTA-SYNEPHRINE) 30 mg in 0.9% sodium chloride 250 mL infusion   mcg/min IntraVENous TITRATE    sodium chloride (NS) flush 5-40 mL  5-40 mL IntraVENous Q8H    sodium chloride (NS) flush 5-40 mL  5-40 mL IntraVENous PRN    acetaminophen (TYLENOL) solution 650 mg  650 mg Oral Q4H PRN    naloxone (NARCAN) injection 0.4 mg  0.4 mg IntraVENous PRN    ondansetron (ZOFRAN) injection 4 mg  4 mg IntraVENous Q4H PRN       Allergies/Social/Family History:     No Known Allergies   Social History     Tobacco Use    Smoking status: Never Smoker    Smokeless tobacco: Never Used   Substance Use Topics    Alcohol use: No      History reviewed. No pertinent family history. Review of Systems:     Review of systems not obtained due to patient factors. Objective:   Vital Signs:  Visit Vitals  BP 98/46 (BP Patient Position: Supine)   Pulse 83   Temp 98.4 °F (36.9 °C)   Resp 13   Wt 61.8 kg (136 lb 3.9 oz)   SpO2 95%   BMI 34.10 kg/m²    O2 Flow Rate (L/min): 2 l/min O2 Device: Room air Temp (24hrs), Av.9 °F (36.6 °C), Min:96.9 °F (36.1 °C), Max:98.7 °F (37.1 °C)           Intake/Output:     Intake/Output Summary (Last 24 hours) at 1/3/2020 1514  Last data filed at 1/3/2020 1400  Gross per 24 hour   Intake 2740 ml   Output 1525 ml   Net 1215 ml       Physical Exam:    General:  alert, uncooperative, mild distress, appears younger than stated age, mildly obese, Down Syndrome faces  Eye:  conjunctivae/corneas clear.  PERRL, EOM's intact-on L, R eye L deviation (not new)  Neurologic:  grossly non-focal, motor: UTO-pt not cooperative, standing steady, follows some requests, able to feed self,  Neck:  normal and neck supple and symmetrical.  no obvious masses  Lungs:  clear to auscultation bilaterally, diminished breath sounds R base, L base  Heart:  regular rate and rhythm, S1, S2 normal, no murmur  Abdomen:  soft, non-tender. Bowel sounds normal.   Cardiovascular:  Regular rate and rhythm, S1S2 present, without murmur or extra heart sounds, pedal pulses normal and no edema  Skin:  Normal. and LAMAR w/ s/s d/c, dressing intact after replication    LABS AND  DATA: Personally reviewed  Recent Labs     01/03/20 0518 01/02/20  1416   WBC 8.0 6.7   HGB 14.1 15.3   HCT 43.1 46.0    243     Recent Labs     01/03/20 0518 01/02/20  1416    141   K 4.0 4.0    105   CO2 24 29   BUN 11 15   CREA 0.76 0.91   * 104*   CA 8.0* 8.9   MG 1.9  --    PHOS 3.0  --      Recent Labs     01/03/20 0518 01/02/20  1416   SGOT 36 26   AP 91 104   TP 6.5 7.0   ALB 2.7* 2.9*   GLOB 3.8 4.1*     Recent Labs     01/03/20  0518   INR 1.1   PTP 11.1   APTT 23.6      No results for input(s): PHI, PCO2I, PO2I, FIO2I in the last 72 hours. No results for input(s): CPK, CKMB, TROIQ, BNPP in the last 72 hours. Hemodynamics:   PAP:   CO:     Wedge:   CI:     CVP:    SVR:       PVR:       Ventilator Settings:  Mode Rate Tidal Volume Pressure FiO2 PEEP                    Peak airway pressure:      Minute ventilation:          MEDS: Reviewed    CT head: personally reviewed and report checked      Assessment:     ICU Problems:  Chronic SDH  Craniotomy ro drain SHD  Down Syndrome  Anxiety    ICU Comprehensive Plan of Care:   Plans for this Shift:   1. Neurochecks  2. NS team-doing well, continue to monitor  3. PT/OT/Speech tx  4. Anxiolysis-pt's brother reports increase in meds recently, asks to decrease them again since she is so much better already; sitter for safety  5. D/w pt, pt's brother-improvements, pain control, hisotry,plans for care, etc. Questions and concerns addressed    Multidisciplinary Rounds Completed:   Yes    ABCDEF Bundle/Checklist  Pain Medications: Fentanyl and Acetaminophen  Target RASS: 0 - Alert & Calm - Spontaneously pays attention to caregiver  Sedation Medications: None  CAM-ICU:  Negative  Mobility: Fair  PT/OT: PT consulted and on board, OT consulted and on board and Speech therapy consulted and on board   Restraints: Marks  Discussed Plan of Care (goals of care): Yes  Addressed Code Status: Full Code    CARDIOVASCULAR  Cardiac Gtts: None  SBP Goal of: < 140 mmHg  MAP Goal of: < 90 mmHg  Transfusion Trigger (Hgb): <7 g/dL    RESPIRATORY  Vent Goals:   Aggressive bronchopulmonary hygiene  DVT Prophylaxis (if no, list reason): SCD's or Sequential Compression Device   SPO2 Goal: > 92%  Pulmonary toilet: as best as possible     GI/  [place]jas Catheter Present: No  GI Prophylaxis: Pepcid (famotidine)   Nutrition: Yes eating   IVFs: LR  Bowel Movement: Yes  Bowel Regimen: MiraLax  Insulin:     ANTIBIOTICS  Antibiotics:  none    T/L/D  Tubes: None  Lines: Peripheral IV  Drains: Gold-Hernandez Drain (LAMAR)    SPECIAL EQUIPMENT  None    DISPOSITION  Stay in ICU    CRITICAL CARE CONSULTANT NOTE  I had a face to face encounter with the patient, reviewed and interpreted patient data including clinical events, labs, images, vital signs, I/O's, and examined patient. I have discussed the case and the plan and management of the patient's care with the consulting services, the bedside nurses and the respiratory therapist.      NOTE OF PERSONAL INVOLVEMENT IN CARE   This patient has a high probability of imminent, clinically significant deterioration, which requires the highest level of preparedness to intervene urgently. I participated in the decision-making and personally managed or directed the management of the following life and organ supporting interventions that required my frequent assessment to treat or prevent imminent deterioration. I personally spent 45 minutes of critical care time.   This is time spent at this critically ill patient's bedside actively involved in patient care as well as the coordination of care and discussions with the patient's family. This does not include any procedural time which has been billed separately.     Caitlin Byrd  Staff Intensivist/Anesthesiologist  [facility]  1/3/2020

## 2020-01-03 NOTE — H&P
[facility]    ICU Team H&P    Name: Holland Nevarez   : 1955   MRN: [id number]   Date: 2020      Subjective:   Progress Note: 2020      Patient is asked to be seen by Dr. Marizol See for SDH, necessitating the need for possible ICU care. Reason for ICU Admission: SDH, post-op     HPI: 71F w/ Downs txf'd [facility] for NSurg eval for SDH. Pt eval'd in PACU, and is unable to give any history. As such, history is from providers and records. I am unsure of her baseline, but it seems by documentation that she is unable to give much history at baseline. She was brought in by her care taker for concerns of altered mental status. She was lethargic, withdrawn. She'd been having balance issues and difficulty standing, which is not her baseline. In the workup in the ED she had a CTHead showing a chronic SDH w/ 10 mm rightward midline shift. She was seen by Edgar who took her to the OR for crani. Currently, she is still somnolent and in PACU, recovering from anesthesia. POD:Day of Surgery    S/P: Procedure(s):  CRANIOTOMY LEFT FRONTAL FORSUBDURAL HEMATOMA    Active Problem List:     Problem List  Date Reviewed: 2020          Codes Class    * (Principal) SDH (subdural hematoma) ([facility] ICD-10-CM: H39.9T2F  ICD-9-CM: 432.1         Down's syndrome (Chronic) ICD-10-CM: Q90.9  ICD-9-CM: 758.0         Acquired hypothyroidism (Chronic) ICD-10-CM: E03.9  ICD-9-CM: 244.9               Past Medical History:      has a past medical history of Endocrine disease and Psychiatric disorder. Past Surgical History:      has a past surgical history that includes hx cholecystectomy. Home Medications:     Prior to Admission medications    Medication Sig Start Date End Date Taking? Authorizing Provider   calcium-cholecalciferol, d3, (CALCIUM 600 + D) 600-125 mg-unit tab Take  by mouth. Other, MD Coleman   QUEtiapine (SEROQUEL) 25 mg tablet Take 25 mg by mouth two (2) times a day.     Coleman Fournier MD trazodone HCl (TRAZODONE PO) Take  by mouth. Coleman Fournier MD   loratadine (CLARITIN) 10 mg tablet Take 1 Tab by mouth daily. 18   [place] Shelley MD   aspirin 81 mg chewable tablet Take 81 mg by mouth daily. Coleman Fournier MD   levothyroxine (SYNTHROID) 50 mcg tablet Take 50 mcg by mouth Daily (before breakfast). Coleman Fournier MD       Allergies/Social/Family History:     No Known Allergies   Social History     Tobacco Use    Smoking status: Never Smoker    Smokeless tobacco: Never Used   Substance Use Topics    Alcohol use: No      History reviewed. No pertinent family history. Review of Systems:     Review of systems not obtained due to patient factors. UTO d/t somnolence, acuity of illness. Objective:   Vital Signs:  Visit Vitals  /58   Pulse 77   Temp 96.9 °F (36.1 °C)   Resp 16   SpO2 100%      O2 Device: CO2 nasal cannula Temp (24hrs), Av.7 °F (36.5 °C), Min:96.9 °F (36.1 °C), Max:98.7 °F (37.1 °C)           Intake/Output:     Intake/Output Summary (Last 24 hours) at 2020 2338  Last data filed at 2020 2155  Gross per 24 hour   Intake 1000 ml   Output 300 ml   Net 700 ml       Physical Exam:    General:  Somnolent, arousable, Downs-appearing, cooperative, well noursished, well developed, appears stated age   Eyes:  Sclera anicteric. Pupils equally round and reactive to light. Right eye medially deviated. Mouth/Throat: Mucous membranes normal, oral pharynx clear   Neck: Supple   Lungs:   Clear to auscultation bilaterally, good effort   CV:  Regular rate and rhythm,no murmur, click, rub or gallop   Abdomen:   Soft, non-tender.  bowel sounds normal. non-distended   Extremities: No cyanosis or edema   Skin: Skin color, texture, turgor normal. no acute rash or lesions   Lymph nodes: Cervical and supraclavicular normal   Musculoskeletal: No swelling or deformity   Lines/Devices:  Intact, no erythema, drainage or tenderness   Psych: Somnolent, wakes some, no commands for me LABS AND  DATA: Personally reviewed  Recent Labs     01/02/20  1416   WBC 6.7   HGB 15.3   HCT 46.0        Recent Labs     01/02/20  1416      K 4.0      CO2 29   BUN 15   CREA 0.91   *   CA 8.9     Recent Labs     01/02/20  1416   SGOT 26      TP 7.0   ALB 2.9*   GLOB 4.1*     No results for input(s): INR, PTP, APTT, INREXT, INREXT in the last 72 hours. No results for input(s): PHI, PCO2I, PO2I, FIO2I in the last 72 hours. No results for input(s): CPK, CKMB, TROIQ, BNPP in the last 72 hours. Hemodynamics:   PAP:   CO:     Wedge:   CI:     CVP:    SVR:       PVR:       Ventilator Settings:  Mode Rate Tidal Volume Pressure FiO2 PEEP                    Peak airway pressure:      Minute ventilation:          MEDS: Reviewed      CTHead 1/2/20:   IMPRESSION:  1. Predominantly hypodense left cerebral convexity subdural fluid collection  with hyperdense membranes, most consistent with a chronic subdural hematoma. Mass effect results in 10 mm of rightward midline shift and descending  transtentorial herniation with effacement of the suprasellar cistern. No large  acute component is identified within the subdural hematoma. 2. Additional small right cerebral convexity hypodense subdural fluid collection  measuring up to 8 mm in maximal thickness, most consistent with an additional  chronic subdural hematoma. CTA Head/Neck 1/2/20:   IMPRESSION:   CTA Head:  1. No evidence of significant stenosis or aneurysm. 2. Redemonstrated bilateral chronic subdural hematomas, left larger than right,  with rightward midline shift and descending transtentorial herniation.     CTA Neck:  1. No evidence of significant stenosis.         Assessment and Plan:     SDH (subdural hematoma) (HCC)  - POD0 left frontal crani  - frequent neuro checks  - keppra for sz ppx  - hold home ASA - was on no other anticoagulants/antiplatelets that are documented  - pain control  - PT/OT/Rehab  - IS if able to coordinate  - d/w Dr. Shanna Hernández    Acquired hypothyroidism  - Continue home synthroid when able to take po    Down's syndrome  - continue home seroquel when able to take po        Multidisciplinary Rounds Completed:  No    ABCDEF Bundle/Checklist  Pain Medications: None  Target RASS: 0 - Alert & Calm - Spontaneously pays attention to caregiver  Sedation Medications: N/A  CAM-ICU:  n/a  Mobility: unknown  PT/OT: PT consulted and on board   Restraints: None needed at this time  Discussed Plan of Care (goals of care): No  Addressed Code Status: Full Code    CARDIOVASCULAR  Cardiac Gtts: Phenylephrine  SBP Goal of: > 90 mmHg  MAP Goal of: > 65 mmHg  Transfusion Trigger (Hgb): <7 g/dL    RESPIRATORY  Vent Goals:   N/A  DVT Prophylaxis (if no, list reason): SCD's or Sequential Compression Device   SPO2 Goal: > 92%  Pulmonary toilet: n/a     GI/  Riojas Catheter Present: Yes  GI Prophylaxis: Not at this time   Nutrition: Pending   IVFs: yes  Bowel Movement: N/A  Bowel Regimen: None needed at this time  Insulin: n/a    ANTIBIOTICS  Antibiotics:  routine loki-op    T/L/D  Tubes: None  Lines: Peripheral IV  Drains: Riojas Catheter    SPECIAL EQUIPMENT  None    DISPOSITION  Stay in ICU    CRITICAL CARE CONSULTANT NOTE  I had a face to face encounter with the patient, reviewed and interpreted patient data including clinical events, labs, images, vital signs, I/O's, and examined patient. I have discussed the case and the plan and management of the patient's care with the consulting services, the bedside nurses and the respiratory therapist.      NOTE OF PERSONAL INVOLVEMENT IN CARE   This patient has a high probability of imminent, clinically significant deterioration, which requires the highest level of preparedness to intervene urgently.  I participated in the decision-making and personally managed or directed the management of the following life and organ supporting interventions that required my frequent assessment to treat or prevent imminent deterioration. I personally spent 31 minutes of critical care time. This is time spent at this critically ill patient's bedside actively involved in patient care as well as the coordination of care and discussions with the patient's family. This does not include any procedural time which has been billed separately.     Angel Fallon DO  Pulmonary, Critical Care Medicine  [facility]  1/2/2020

## 2020-01-03 NOTE — ASSESSMENT & PLAN NOTE
- POD0 left frontal crani  - frequent neuro checks  - keppra for sz ppx  - hold home ASA - was on no other anticoagulants/antiplatelets that are documented  - pain control  - PT/OT/Rehab  - IS if able to coordinate  - d/w NSurg, Dr. Serene Merrill

## 2020-01-03 NOTE — PROGRESS NOTES
0215: Pt received from Pacu, VSS 2L NC, resting comfortably. 0600: Travel to CT on monitor w/o complication.       Primary Nurse Inge Goff RN and Shakira Kc RN performed a dual skin assessment on this patient No impairment noted  Yosef score is 17

## 2020-01-03 NOTE — PROGRESS NOTES
0800 Bedside and Verbal shift change report given to Mari Thomas RN (oncoming nurse) by Ryan Felix RN (offgoing nurse). Report included the following information SBAR, Kardex, ED Summary, OR Summary, Procedure Summary, Intake/Output, MAR, Accordion, Recent Results, Med Rec Status, Cardiac Rhythm NSR, Alarm Parameters  and Dual Neuro Assessment. Assumed care of pt. Assessment complete. 1000 Pt pulling off crani dressing. New dressing applied. Order for Mitts given by Dr. Saira Santiago. 56 Pt keeps pulling mitts off, Dr. Saira Santiago placing orders for a sitter. This RN staying in room until sitter can come up. Mitts off pt.   1100 Sitter at bedside. 2000 Bedside and Verbal shift change report given to Sofía Luz RN (oncoming nurse) by Mari Thomas RN (offgoing nurse). Report included the following information SBAR, Kardex, ED Summary, OR Summary, Procedure Summary, Intake/Output, MAR, Accordion, Recent Results, Med Rec Status, Cardiac Rhythm NSR/sinus tach, Alarm Parameters  and Dual Neuro Assessment.

## 2020-01-03 NOTE — CONSULTS
[address]    Name:  Kuldip Worrell  MR#:  [id number]  :  1955  ACCOUNT #:  [de-identified]  DATE OF SERVICE:  2020    REASON FOR CONSULTATION:  Subdural hematoma. HISTORY OF PRESENT ILLNESS:  The patient is a 14-year-old female with Down syndrome. The brother who is a next to kin has noticed increasing confusion from lethargy. She has increasing fatigue, bilateral lower extremity weakness leading to her left side, appears to be weak on her left side. There is also some change in her eye function where she is turning her right eye in. Because of these findings, a head CT was done, which showed a very large chronic subdural hematoma on the left with multiple membranes. She was transferred to [facility] where I was called for evaluation. PAST MEDICAL HISTORY:  Significant for endocrine disease, hypothyroidism, Down syndrome. SOCIAL HISTORY:  Does not smoke. ALLERGIES:  NO KNOWN DRUG ALLERGIES. MEDICATIONS:  Calcium, Seroquel, trazodone, Claritin, baby aspirin, Synthroid. FAMILY HISTORY:  Noncontributory. REVIEW OF SYSTEMS:  Unable to obtain secondary to the patient. PHYSICAL EXAMINATION:  GENERAL:  She is a Downs appearing female sitting in no acute distress. HEENT:  Her head is normocephalic and atraumatic. Her pupils are equal.  The right eye is medial deviated. The brother says this is new for her, but it sounds like it happened before. Her face is otherwise symmetric. NEUROLOGIC:  She will lift both arms and say a word or two to me. The rest of her neurologic exam is unable to obtain secondary to her neurologic status. RADIOLOGIC DATA:  CT scan shows a large left chronic subdural hematoma with multiple membranes. IMPRESSION:  Chronic subdural hematoma. PLAN:  Given the new eye findings and her lethargy, we will take her urgently to the operating room to decompress her brain via craniotomy.   We discussed the risks and benefits with the brother, who is next to kin and power of . We will do this fairly urgently.       Mary Ferreira MD [id number]  D:  01/02/2020 22:00  T:  01/02/2020 23:45  JOB #:  [id number]

## 2020-01-03 NOTE — PROGRESS NOTES
Problem: Dysphagia (Adult)  Goal: *Acute Goals and Plan of Care (Insert Text)  Description  Speech Therapy Goals  Initiated 1/3/2020    1. Patient will tolerate mechanically-altered ground diet and thin liquids without adverse effects within 7 days. Outcome: Progressing Towards Goal     SPEECH LANGUAGE PATHOLOGY BEDSIDE SWALLOW EVALUATION  Patient: Jose Mora [de-identified]71 y.o. female)  Date: 1/3/2020  Primary Diagnosis: SDH (subdural hematoma) (HCC) [S06.5X9A]  Procedure(s) (LRB):  CRANIOTOMY LEFT FRONTAL FORSUBDURAL HEMATOMA (Left) 1 Day Post-Op   Precautions: n/a     ASSESSMENT :  Based on the objective data described below, the patient presents with mild-moderate oropharyngeal dysphagia. Pt with overall discoordination and impulsivity impacting swallow safety. Pt also with some residue with solid consistency noted. Therefore, recommend pt initiate diet as outlined below. Pt may be at baseline swallow function. Will follow-up at least for one more visit hopefully when family present to glean patient's baseline swallow function and determine dysphagia goals from there. Patient will benefit from skilled intervention to address the above impairments. Patients rehabilitation potential is considered to be Fair     PLAN :  Recommendations and Planned Interventions:  --mechanically-altered (ground) diet/thin liquids  --small sips/bites  --alternate liquids/solids  --fully upright during meals  --1:1 assistance    Frequency/Duration: Patient will be followed by speech-language pathology 3 times a week to address goals. Discharge Recommendations: To Be Determined     SUBJECTIVE:   Patient stated, \"Yes!  when asked if she was hungry.     OBJECTIVE:     Past Medical History:   Diagnosis Date    Endocrine disease     hypothyroidism    Psychiatric disorder     Down Syndrome     Past Surgical History:   Procedure Laterality Date    HX CHOLECYSTECTOMY       Prior Level of Function/Home Situation:   [address] [place] [address] Environment: Apartment  # Steps to Enter: 1  One/Two Story Residence: One story  Current DME Used/Available at Home: Grab bars, Shower chair, Walker, rolling  Tub or Shower Type: Tub/Shower combination  Diet prior to admission: Unclear  Current Diet:  Regular diet/thin liquids however RN had been holding diet until STAND vs SLP evaluation  Cognitive and Communication Status:  Neurologic State: Alert  Orientation Level: (Unable to assess 2/2 down syndrome)  Cognition: (down syndrome at baseline)    Oral Assessment:  Oral Assessment  Labial: Impaired coordination  Dentition: Intact  Oral Hygiene: oral mucosa moist and clear of secretions  Lingual: Incoordinated  Velum: No impairment  Mandible: No impairment  P.O. Trials:  Patient Position: upright in bed  Vocal quality prior to P.O.: No impairment  Consistency Presented: Solid; Thin liquid  How Presented: Cup/sip;SLP-fed/presented;Spoon;Straw;Successive swallows     Bolus Acceptance: No impairment  Bolus Formation/Control: Impaired  Type of Impairment: Delayed;Mastication;Piecemeal  Propulsion: Delayed (# of seconds); Discoordination  Oral Residue: 10-50% of bolus; Left  Initiation of Swallow: Delayed (# of seconds)  Laryngeal Elevation: Decreased  Aspiration Signs/Symptoms: None  Pharyngeal Phase Characteristics: No impairment, issues, or problems              Oral Phase Severity: Mild-moderate  Pharyngeal Phase Severity : Mild-moderate    NOMS:   The NOMS functional outcome measure was used to quantify this patient's level of swallowing impairment. Based on the NOMS, the patient was determined to be at level 4 for swallow function       NOMS Swallowing Levels:  Level 1 (CN): NPO  Level 2 (CM): NPO but takes consistency in therapy  Level 3 (CL): Takes less than 50% of nutrition p.o. and continues with nonoral feedings; and/or safe with mod cues; and/or max diet restriction  Level 4 (CK):  Safe swallow but needs mod cues; and/or mod diet restriction; and/or still requires some nonoral feeding/supplements  Level 5 (CJ): Safe swallow with min diet restriction; and/or needs min cues  Level 6 (CI): Independent with p.o.; rare cues; usually self cues; may need to avoid some foods or needs extra time  Level 7 ([address] [place] [address]): Independent for all p.o.  ABDI. (2003). National Outcomes Measurement System (NOMS): Adult Speech-Language Pathology User's Guide. Pain:  Pain Scale 1: Adult Nonverbal Pain Scale          After treatment:   Nursing notified    COMMUNICATION/EDUCATION:     The patient's plan of care including recommendations, planned interventions, and recommended diet changes were discussed with: Registered Nurse. Patient is unable to participate in goal setting and plan of care.     Thank you for this referral.  YESY Dinero  Time Calculation: 9 mins

## 2020-01-03 NOTE — PROGRESS NOTES
Transitions of care    TBD pending medical progress    Reason for Admission:  Craniotomy for evacuationof a SDH. RRAT Score:  9 / low                   Plan for utilizing home health:     TBD                     Current Advanced Directive/Advance Care Plan: Not on file, Brother Armando Umanzor [phone] is patient's [facility] and caregiver                         Care manager met with patient's brother Diego Moreno to introduce self and explain role. Patient lives with her brother and his wife. Per brother patient is ambulatory, no previous equipment needs. She has an aide Monday through Saturday for 8 hours a day. Per brother patient needs assist with her ADL's. He confirmed her PCP to be Lorne De Anda [phone] and she sees patient in the home and was last seen Jan. 2nd. Confirmed her insurance to be correct on the demographic sheet. Care management will follow for transitions of care.  John Mena RN,CRM    Care Management Interventions  PCP Verified by CM: Yes(Montserrat Andersen NP )  Last Visit to PCP: 01/02/20  MyChart Signup: No  Discharge Durable Medical Equipment: No  Physical Therapy Consult: Yes  Occupational Therapy Consult: Yes  Speech Therapy Consult: Yes  Current Support Network: Relative's Home(Brother Armando Umanzor [phone])

## 2020-01-04 NOTE — PROGRESS NOTES
When I walked into pt room I noticed she was actively having a focal right facial seizure  Asked nursing to give IV Ativan now and raise dose of keppra to !000mg BID  Will get CT of brain, residual blood noted on scan from yesterday, will leave drain in place today   Baseline exam is poor so it is difficult to examine her at best.   Discussed with ICU intensivist  as well

## 2020-01-04 NOTE — ROUTINE PROCESS
SKIN ASSESS:     Primary Nurse Oumou Fournier RN and Herman Reynoso RN performed a dual skin assessment on this patient No impairment noted  Yosef score is 17.

## 2020-01-04 NOTE — PROGRESS NOTES
[facility]    ICU TEAM Progress Note    Name: True Riley   : 1955   MRN: [id number]   Date: 2020      Subjective:   Progress Note: 2020      Reason for ICU Admission:   Chronic SDH  Craniotomy for drain SHD  Down Syndrome  Anxiety d/o    Overnight Events: R facial seizure this am, outwardly extinguished w/ ativan, no desaturation    POD:2 Days Post-Op    S/P: Procedure(s):  CRANIOTOMY LEFT FRONTAL FORSUBDURAL HEMATOMA    Active Problem List:     Problem List  Date Reviewed: 2020          Codes Class    * (Principal) SDH (subdural hematoma) ([facility] ICD-10-CM: A34.2T3H  ICD-9-CM: 432.1         Down's syndrome (Chronic) ICD-10-CM: Q90.9  ICD-9-CM: 758.0         Acquired hypothyroidism (Chronic) ICD-10-CM: E03.9  ICD-9-CM: 244.9               Past Medical History:      has a past medical history of Endocrine disease and Psychiatric disorder. Past Surgical History:      has a past surgical history that includes hx cholecystectomy.     Medications:     Current Facility-Administered Medications   Medication Dose Route Frequency    potassium chloride 10 mEq in 50 ml IVPB  10 mEq IntraVENous Q4H    LORazepam (ATIVAN) 2 mg/mL injection        levETIRAcetam (KEPPRA) 1,000 mg in 0.9% sodium chloride 100 mL IVPB  1,000 mg IntraVENous Q12H    LORazepam (ATIVAN) injection 2 mg  2 mg IntraVENous Q1H PRN    LORazepam (ATIVAN) 2 mg/mL injection        levothyroxine (SYNTHROID) tablet 75 mcg  75 mcg Oral ACB    loratadine (CLARITIN) tablet 10 mg  10 mg Oral DAILY    traZODone (DESYREL) tablet 50 mg  50 mg Oral QPM    sodium chloride (NS) flush 5-40 mL  5-40 mL IntraVENous Q8H    sodium chloride (NS) flush 5-40 mL  5-40 mL IntraVENous PRN    0.9% sodium chloride infusion  75 mL/hr IntraVENous CONTINUOUS    acetaminophen (TYLENOL) solution 650 mg  650 mg Oral Q4H PRN    HYDROcodone-acetaminophen (NORCO) 5-325 mg per tablet 1 Tab  1 Tab Oral Q4H PRN    ondansetron (ZOFRAN) injection 4 mg  4 mg IntraVENous Q4H PRN    niCARdipine (CARDENE) 25 mg in 0.9% sodium chloride 250 mL infusion  5-15 mg/hr IntraVENous TITRATE    labetalol (NORMODYNE;TRANDATE) injection 10 mg  10 mg IntraVENous Q15MIN PRN    Or    hydrALAZINE (APRESOLINE) 20 mg/mL injection 10 mg  10 mg IntraVENous Q15MIN PRN    docusate sodium (COLACE) capsule 100 mg  100 mg Oral BID    fentaNYL citrate (PF) injection 25-50 mcg  25-50 mcg IntraVENous Q1H PRN    QUEtiapine (SEROquel) tablet 25 mg  25 mg Oral QHS    sodium chloride (NS) flush 5-40 mL  5-40 mL IntraVENous Q8H    sodium chloride (NS) flush 5-40 mL  5-40 mL IntraVENous PRN    naloxone (NARCAN) injection 0.4 mg  0.4 mg IntraVENous PRN       Allergies/Social/Family History:     No Known Allergies   Social History     Tobacco Use    Smoking status: Never Smoker    Smokeless tobacco: Never Used   Substance Use Topics    Alcohol use: No      History reviewed. No pertinent family history. Review of Systems:     Review of systems not obtained due to patient factors. Objective:   Vital Signs:  Visit Vitals  /54   Pulse 62   Temp 98.3 °F (36.8 °C)   Resp 16   Wt 60.1 kg (132 lb 7.9 oz)   SpO2 94%   BMI 33.16 kg/m²    O2 Flow Rate (L/min): 2 l/min O2 Device: Room air Temp (24hrs), Av.9 °F (37.2 °C), Min:98.3 °F (36.8 °C), Max:100 °F (37.8 °C)           Intake/Output:     Intake/Output Summary (Last 24 hours) at 2020 1302  Last data filed at 2020 0400  Gross per 24 hour   Intake 1145 ml   Output 95 ml   Net 1050 ml       Physical Exam:  General:  somnolent, having R facial seizure, controlling airway, not responsive, appears younger than stated age, mildly obese, Down Syndrome faces  Eye:  conjunctivae/corneas clear.  PERRL, EOM's intact-on L, R eye L deviation (not new)  Neurologic:  facial seizure, o/w grossly non-focal, motor: UTO-pt not cooperative, standing steady yesterday, follows no requests-sedated w/ ativan after seizure  Neck:  normal and neck supple and symmetrical.  no obvious masses  Lungs:  Rhonchi alfonzo bases, not coughing  Heart:  regular rate and rhythm, S1, S2 normal, no murmur  Abdomen:  soft, non-tender. Bowel sounds normal.   Cardiovascular:  Regular rate and rhythm, S1S2 present, without murmur or extra heart sounds, pedal pulses normal and no edema  Skin:  Normal. and LAMAR w/ s/s d/c, dressing intact after replication    LABS AND  DATA: Personally reviewed  Recent Labs     01/04/20 0315 01/03/20  0518   WBC 6.4 8.0   HGB 13.3 14.1   HCT 41.3 43.1    210     Recent Labs     01/04/20 0315 01/03/20  0518    139   K 3.3* 4.0    107   CO2 29 24   BUN 12 11   CREA 0.70 0.76   GLU 95 119*   CA 8.2* 8.0*   MG 2.1 1.9   PHOS 2.4* 3.0     Recent Labs     01/03/20 0518 01/02/20  1416   SGOT 36 26   AP 91 104   TP 6.5 7.0   ALB 2.7* 2.9*   GLOB 3.8 4.1*     Recent Labs     01/03/20 0518   INR 1.1   PTP 11.1   APTT 23.6      No results for input(s): PHI, PCO2I, PO2I, FIO2I in the last 72 hours. No results for input(s): CPK, CKMB, TROIQ, BNPP in the last 72 hours. Hemodynamics:   PAP:   CO:     Wedge:   CI:     CVP:    SVR:       PVR:       Ventilator Settings:  Mode Rate Tidal Volume Pressure FiO2 PEEP                    Peak airway pressure:      Minute ventilation:          MEDS: Reviewed    CT head: personally reviewed and report checked      Assessment:     ICU Problems:  New onset seizures  SHD  S/p Craniotomy for SHD drainage  Down Syndrome    ICU Comprehensive Plan of Care:   Plans for this Shift: 1. Increased kepra to 1000mg BID after seizure; ativan prn seizure activity  1. Neurochecks q1hr  2. D/w Dr. Allen-increased antiepileptics, ordered head CT, leaving drain in place-  3. PT/OT/Speech tx-passed swallow study, was able to at 1/3  4. Anxiolysis-pt's brother reports increase in meds recently, decreased trazodone/seroquel; sitter for safety  5.  D/w pt, pt's brother-seizure activity and treatment pain control, history,plans for care, etc. Questions and concerns addressed-pat needed a safety helmet for her due to her multiple falls-he relates that sometimes she falls down on purpose pat when she doesn't want to do something, sometimes by accident; requested case management for this; pt's brother wants her to have full code status, he remains her HCP    Multidisciplinary Rounds Completed:  Yes informal    ABCDEF Bundle/Checklist  Pain Medications: Acetaminophen  Target RASS: 0 - Alert & Calm - Spontaneously pays attention to caregiver  Sedation Medications: None  CAM-ICU:  Negative  Mobility: Fair  PT/OT: PT consulted and on board, OT consulted and on board and Speech therapy consulted and on board   Restraints: None needed at this time  Discussed Plan of Care (goals of care): Yes  Addressed Code Status: Full Code    CARDIOVASCULAR  Cardiac Gtts: None  SBP Goal of: > 100 mmHg  MAP Goal of: > 65 mmHg  Transfusion Trigger (Hgb): <7 g/dL    RESPIRATORY  Vent Goals:   Optimize PEEP/Ventilation/Oxygenation  DVT Prophylaxis (if no, list reason): SCD's or Sequential Compression Device   SPO2 Goal: > 92%  Pulmonary toilet: Incentive Spirometry     GI/  Riojas Catheter Present: No  GI Prophylaxis: Pepcid (famotidine)   Nutrition: Yes eating  IVFs: LR  Bowel Movement: Yes  Bowel Regimen: None needed at this time  Insulin:     ANTIBIOTICS  Antibiotics:  none    T/L/D  Tubes: None  Lines: Peripheral IV  Drains: Gold-Hernandez Drain (LAMAR)    SPECIAL EQUIPMENT  None    DISPOSITION  Stay in ICU    CRITICAL CARE CONSULTANT NOTE  I had a face to face encounter with the patient, reviewed and interpreted patient data including clinical events, labs, images, vital signs, I/O's, and examined patient.   I have discussed the case and the plan and management of the patient's care with the consulting services, the bedside nurses and the respiratory therapist.      NOTE OF PERSONAL INVOLVEMENT IN CARE   This patient has a high probability of imminent, clinically significant deterioration, which requires the highest level of preparedness to intervene urgently. I participated in the decision-making and personally managed or directed the management of the following life and organ supporting interventions that required my frequent assessment to treat or prevent imminent deterioration. I personally spent 50 minutes of critical care time. This is time spent at this critically ill patient's bedside actively involved in patient care as well as the coordination of care and discussions with the patient's family. This does not include any procedural time which has been billed separately.     Jessica Frias  Staff Intensivist/Anesthesiologist  [facility]  1/4/2020

## 2020-01-04 NOTE — PROCEDURES
ELECTROENCEPHALOGRAM REPORT    HISTORY: Patient is a 60-year-old female who is being evaluated for altered  mental status, seizure-like activity. DESCRIPTION: This is an 18-channel EEG performed on a confused  Patient s/p SDH evacuation. The dominant posterior background rhythm consists of  medium-voltage rhythms in the 4 Hz frequency range out of the posterior  head region. Occasional generalized sharply contoured waves are noted during the recording without noted EEG seizures. Photic stimulation does not elicit a significant driving response. Hyperventilation was not performed. ELECTROENCEPHALOGRAM SUMMARY: Abnormal EEG due to moderate slowing of  the background rhythms and periodic generalized sharply contoured waves. CLINICAL INTERPRETATION: This EEG is suggestive of some moderate to severe generalized encephalopathic process with periodic sharply contoured waves. No electrographic seizures were recorded. This may be related to underlying structural brain injury and/or toxic/metabolic abnormality. Please correlate  clinically and with imaging studies.     Jailene Flower,   01/04/20

## 2020-01-04 NOTE — PROGRESS NOTES
0800 Bedside and Verbal shift change report given to Jose Alberto Cox RN (oncoming nurse) by Yani Chou RN (offgoing nurse). Report included the following information SBAR, Kardex, ED Summary, OR Summary, Procedure Summary, Intake/Output, MAR, Accordion, Recent Results, Med Rec Status, Cardiac Rhythm NSR/sinus troy, Alarm Parameters  and Dual Neuro Assessment. Assumed care of pt. Assessment completed. Primary Nurse Ariana Gallagher RN and Gus Rodriguez RN performed a dual skin assessment on this patient No impairment noted  Yosef score is 17  0856 Pt having focal right facial seizure. Dr. Silivna Christie at bedside at start of seizure. Ativan 1mg ordered STAT and orders to increase keppra dose to 1000 mg BID.   0906 Additional dose of 1mg Ativan ordered after initial dose not breaking seizure. Seizure ended after second dose given. Seizure activity lasted 10 minutes. Pt went down for head CT -  Shows expected postop changes of L SDH evac with no significant change in size of mixed density L SDH with acute hyperdense components. Stable chronic R SDH. Stable minimal 2 mm or rightward midline shift. STAT EEG - suggestive of some moderate - severe generalized encephalopathic process with periodic sharply contoured waves. No electrographic seizures were recorded. May be r/t underlying structural brain injury and/or toxic/metabolic abnormality. 1620 This RN noticing pt having very subtle rhythmic movement with arms and face. Second RN, Perla Pinzon verifying seizure activity. 2mg ativan pulled and given. Seizure lasted 4 minutes. 1808 Pt with similar subtle rhythmic movements as at 1620. Dr. Saeid Pendleton assessing pt. 1 mg ativan given, per Dr. Saeid Pendleton. Dr. Soundra Houston calling on-call neurosurg and adding orders for vimpat and addional 500mg keppra dose. 1910 Pt with rhythmic movements, Dr. Saeid Pendleton notified. Additional dose of keppra infusing. Awaiting pharmacy to bring vimpat. No new orders at this time.    2000 Bedside and Verbal shift change report given to BARBARA Chavez (oncoming nurse) by Abdi Gordon RN (offgoing nurse). Report included the following information SBAR, Kardex, ED Summary, OR Summary, Procedure Summary, Intake/Output, MAR, Accordion, Recent Results, Med Rec Status, Cardiac Rhythm NSR, Alarm Parameters  and Dual Neuro Assessment.

## 2020-01-05 NOTE — CONSULTS
I have reviewed the documentation provided by the nurse practitioner, discussed her findings, clinical impression, and the proposed management plans with regards to this encounter. I have personally evaluated the patient and verified the history and confirmed the physical findings. Below are my additional findings:  71year old female with Down's syndrome, chronic SDH admitted with encephalopathy, now s/p evacuation of L SDH 1/3/20. Neurology consulted due to reported focal (R facial twitching) as well as questionable generalized seizure activity b/l UE and face noted 1/4. CT head 1/4 with L SDH evacuation, postoperative findings, L SDH largely unchanged, stable chronic R SDH, unchanged 2mm shift. EEG 1/4 with moderate to severe encephalopathy, intermittent sharply contoured waves without electrographic seizure activity. She is now maintained on LEV 1g BID and Vimpat 100mg BID without further clinical seizure activity this AM.  She did receive several doses of Ativan overnight and current examination is limited. She does not follow commands, no eye opening, PERRL, OD medial deviation, no blink to threat, W/D x 4, toes mute b/l. No overt seizure activity on examination. 24h EEG running and shows diffuse slowing- final read pending upload for tomorrow AM.  Continue current AEDs. Seizure precautions.        Angeline Kehr,   01/05/20

## 2020-01-05 NOTE — PROGRESS NOTES
Seizures seem to have stopped, added second anticonvulsant (Vimpat) Opens eyes, not following commands   Dr Adebayo Vera to return tomorrow, I'll follow up with him.  Continue supportive care   Suspect 24 hr EEG may only show \"slowing\" if no active visible seizures

## 2020-01-05 NOTE — PROGRESS NOTES
[facility]    ICU TEAM Progress Note    Name: Mario Tilley   : 1955   MRN: [id number]   Date: 2020      Subjective:   Progress Note: 2020    CC: Altered mental status  Reason for ICU Admission:   Chronic SDH  Craniotomy for drain SHD  Down Syndrome  Anxiety    Overnight Events: 1 episode of seizure in the early AM, was given 4mg of lorazepam, somnolent this AM    POD:1 Day Post-Op    S/P: Procedure(s):  CRANIOTOMY LEFT FRONTAL FORSUBDURAL HEMATOMA    Active Problem List:     Problem List  Date Reviewed: 2020          Codes Class    * (Principal) SDH (subdural hematoma) ([facility] ICD-10-CM: G99.2U3V  ICD-9-CM: 432.1         Down's syndrome (Chronic) ICD-10-CM: Q90.9  ICD-9-CM: 758.0         Acquired hypothyroidism (Chronic) ICD-10-CM: E03.9  ICD-9-CM: 244.9               Past Medical History:      has a past medical history of Endocrine disease and Psychiatric disorder. Past Surgical History:      has a past surgical history that includes hx cholecystectomy.     Medications:     Current Facility-Administered Medications   Medication Dose Route Frequency    dextrose 10% 10 % infusion        dextrose 10 % infusion 125-250 mL  125-250 mL IntraVENous PRN    levETIRAcetam (KEPPRA) 1,000 mg in 0.9% sodium chloride 100 mL IVPB  1,000 mg IntraVENous Q12H    LORazepam (ATIVAN) injection 2 mg  2 mg IntraVENous Q1H PRN    famotidine (PF) (PEPCID) 20 mg in 0.9% sodium chloride 10 mL injection  20 mg IntraVENous Q12H    miconazole (MICOTIN) 2 % powder   Topical BID    lacosamide (VIMPAT) 100 mg in 0.9% sodium chloride 100 mL IVPB  100 mg IntraVENous Q12H    levothyroxine (SYNTHROID) tablet 75 mcg  75 mcg Oral ACB    loratadine (CLARITIN) tablet 10 mg  10 mg Oral DAILY    traZODone (DESYREL) tablet 50 mg  50 mg Oral QPM    sodium chloride (NS) flush 5-40 mL  5-40 mL IntraVENous Q8H    sodium chloride (NS) flush 5-40 mL  5-40 mL IntraVENous PRN    0.9% sodium chloride infusion  75 mL/hr IntraVENous CONTINUOUS    acetaminophen (TYLENOL) solution 650 mg  650 mg Oral Q4H PRN    HYDROcodone-acetaminophen (NORCO) 5-325 mg per tablet 1 Tab  1 Tab Oral Q4H PRN    ondansetron (ZOFRAN) injection 4 mg  4 mg IntraVENous Q4H PRN    niCARdipine (CARDENE) 25 mg in 0.9% sodium chloride 250 mL infusion  5-15 mg/hr IntraVENous TITRATE    labetalol (NORMODYNE;TRANDATE) injection 10 mg  10 mg IntraVENous Q15MIN PRN    Or    hydrALAZINE (APRESOLINE) 20 mg/mL injection 10 mg  10 mg IntraVENous Q15MIN PRN    docusate sodium (COLACE) capsule 100 mg  100 mg Oral BID    fentaNYL citrate (PF) injection 25-50 mcg  25-50 mcg IntraVENous Q1H PRN    QUEtiapine (SEROquel) tablet 25 mg  25 mg Oral QHS    sodium chloride (NS) flush 5-40 mL  5-40 mL IntraVENous Q8H    sodium chloride (NS) flush 5-40 mL  5-40 mL IntraVENous PRN    naloxone (NARCAN) injection 0.4 mg  0.4 mg IntraVENous PRN       Allergies/Social/Family History:     No Known Allergies   Social History     Tobacco Use    Smoking status: Never Smoker    Smokeless tobacco: Never Used   Substance Use Topics    Alcohol use: No      History reviewed. No pertinent family history. Review of Systems:     Review of systems not obtained due to patient factors. Objective:   Vital Signs:  Visit Vitals  BP (!) 86/43   Pulse 78   Temp 97.6 °F (36.4 °C)   Resp 10   Wt 62 kg (136 lb 11 oz)   SpO2 97%   BMI 34.21 kg/m²    O2 Flow Rate (L/min): 2 l/min O2 Device: Room air Temp (24hrs), Av.8 °F (36.6 °C), Min:96.5 °F (35.8 °C), Max:99 °F (37.2 °C)           Intake/Output:     Intake/Output Summary (Last 24 hours) at 2020 0738  Last data filed at 2020 1900  Gross per 24 hour   Intake 1425 ml   Output 25 ml   Net 1400 ml       Physical Exam:    General:  female, Down Syndrome features  Eye:  conjunctivae/corneas clear.  PERRL  Neurologic:  Somnolent, minimally responsive, head bandage with EEG leads   Neck:  normal and neck supple and symmetrical. no obvious masses  Lungs:  clear to auscultation bilaterally, diminished breath sounds R base, L base  Heart:  RR, S1, S2 normal  Abdomen:  soft, non-tender  Skin:  Warm, dry     LABS AND  DATA: Personally reviewed  Recent Labs     01/05/20  0432 01/04/20 0315   WBC 6.8 6.4   HGB 14.6 13.3   HCT 47.0 41.3    227     Recent Labs     01/05/20  0021 01/04/20 0315 01/03/20 0518    141 139   K 3.6 3.3* 4.0    106 107   CO2 28 29 24   BUN 9 12 11   CREA 0.74 0.70 0.76   GLU 83 95 119*   CA 8.0* 8.2* 8.0*   MG 1.7 2.1 1.9   PHOS  --  2.4* 3.0     Recent Labs     01/05/20  0021 01/03/20 0518   SGOT 21 36   AP 83 91   TP 6.4 6.5   ALB 2.5* 2.7*   GLOB 3.9 3.8     Recent Labs     01/03/20 0518   INR 1.1   PTP 11.1   APTT 23.6      No results for input(s): PHI, PCO2I, PO2I, FIO2I in the last 72 hours. No results for input(s): CPK, CKMB, TROIQ, BNPP in the last 72 hours. Hemodynamics:   PAP:   CO:     Wedge:   CI:     CVP:    SVR:       PVR:       Ventilator Settings:  Mode Rate Tidal Volume Pressure FiO2 PEEP                    Peak airway pressure:      Minute ventilation:          MEDS: Reviewed    CT head: personally reviewed and report checked      Assessment:     ICU Problems:  - Chronic SDH  - Tonic clonic Seizure  - Craniotomy ro drain SDH  - Down Syndrome  - Anxiety    ICU Comprehensive Plan of Care:   Plans for this Shift:   1. Neurochecks, continue EEG  2. Monitor neuro status, last lorazepam dosing given 0637  3. 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 PT/OT/Speech tx  5. Hold all anxiolytics  6. Continue antiepileptics   7. RT for pulmonary toileting, ante suction  8. Discussed plans with patient's brother and sister in-law at bedside    Multidisciplinary Rounds Completed:   Yes    ABCDEF Bundle/Checklist  Pain Medications: Fentanyl and Acetaminophen  Target RASS: 0 - Alert & Calm - Spontaneously pays attention to caregiver  Sedation Medications: None  CAM-ICU:  Negative  Mobility: Fair  PT/OT: PT consulted and on board, OT consulted and on board and Speech therapy consulted and on board   Restraints: Luis  Discussed Plan of Care (goals of care): Yes  Addressed Code Status: Full Code    CARDIOVASCULAR  Cardiac Gtts: None  SBP Goal of: < 140 mmHg  MAP Goal of: < 90 mmHg  Transfusion Trigger (Hgb): <7 g/dL    RESPIRATORY  Vent Goals:   Aggressive bronchopulmonary hygiene  DVT Prophylaxis (if no, list reason): SCD's or Sequential Compression Device   SPO2 Goal: > 92%  Pulmonary toilet: as best as possible     GI/  Riojas Catheter Present: No  GI Prophylaxis: Pepcid (famotidine)   Nutrition: No   IVFs: NS  Bowel Movement: Yes  Bowel Regimen: MiraLax  Insulin:     ANTIBIOTICS  Antibiotics:  none    T/L/D  Tubes: None  Lines: Peripheral IV  Drains: Gold-Hernandez Drain (LAMAR)    SPECIAL EQUIPMENT  None    DISPOSITION  Stay in ICU    CRITICAL CARE CONSULTANT NOTE  I had a face to face encounter with the patient, reviewed and interpreted patient data including clinical events, labs, images, vital signs, I/O's, and examined patient. I have discussed the case and the plan and management of the patient's care with the consulting services, the bedside nurses and the respiratory therapist.      NOTE OF PERSONAL INVOLVEMENT IN CARE   This patient has a high probability of imminent, clinically significant deterioration, which requires the highest level of preparedness to intervene urgently. I participated in the decision-making and personally managed or directed the management of the following life and organ supporting interventions that required my frequent assessment to treat or prevent imminent deterioration. I personally spent 35 minutes of critical care time.   This is time spent at this critically ill patient's bedside actively involved in patient care as well as the coordination of care and discussions with the patient's family. This does not include any procedural time which has been billed separately.     Gennette Pallas, MD  1/5/2020

## 2020-01-05 NOTE — PROGRESS NOTES
0800 Bedside and Verbal shift change report given to BARBARA Santos (oncoming nurse) by Eileen Clark RN (offgoing nurse). Report included the following information SBAR, Kardex, ED Summary, OR Summary, Procedure Summary, Intake/Output, MAR, Accordion, Recent Results, Med Rec Status, Cardiac Rhythm NSR/sinus troy, Alarm Parameters  and Dual Neuro Assessment. Assumed care of pt. Assessment complete. 0830 Dr. Sergio Robbins at bedside to remove LAMAR drain from brain. Will follow up with Dr. Kit Ibarra. 1600 This RN paging EEG tech Jian Mak due to 2 of pt's EEG leads coming off. Ruperto Harden stated that he is in-house with a pt and will be up soon. 2000 Bedside and Verbal shift change report given to , RN (oncoming nurse) by BARBARA Santos (offgoing nurse). Report included the following information SBAR, Kardex, ED Summary, OR Summary, Procedure Summary, Intake/Output, MAR, Accordion, Recent Results, Med Rec Status, Cardiac Rhythm NSR/sinus troy, Alarm Parameters  and Dual Neuro Assessment. Shift summery: Neurology consulted, Madison Zaragoza NP assessed pt and there looks like slowing on the 24hr EEG. Dr. Geri Chinchilla would like to hold off on giving any more ativan to pt.  Pt had SBP in 70's earlier in shift, 500 mL LR bolus given, as well as 2g Mg, per Dr. Geri Chinchilla.

## 2020-01-05 NOTE — CONSULTS
Neurology Consult  [facility] Ter[facility]  Neurocritical Care NP  [phone]    Patient: Yasmany Greco MRN: [id number]  SSN: [id number]    YOB: 1955  Age: 71 y.o. Sex: female        Chief Complaint: seizures    Subjective:      Yasmany Greco is a 71 y.o. female with a PMH of hypothyroidism and down syndrome who presented to the ED on 1/2/2020 with altered mental status. Information obtained from the patient's records due to patient's altered mental status. She was brought to the ED by her caretaker because the patient was lethargic, having difficulty with balance and essentially not at her baseline. It is unclear of her baseline as no family is currently present to obtain history. In the ED, a CT of the Head was performed which showed a chronic left subdural hematoma with mass effect and a 10 mm rightward midline shift and descending transtentorial herniation with effacement of the suprasellar cistern. There was an addition right cerebral convexity hypodense subdural fluid collection  measuring up to 8 mm in maximal thickness, most consistent with an additional chronic subdural hematoma. A CTA of the head and neck was also done which showed no evidence of significant stenosis or aneurysm. Redemonstrated bilateral chronic subdural hematomas, left larger than right, with rightward midline shift and descending transtentorial herniation. Neurosurgery was consulted and the patient was taken to the OR on 1/2/2020 for a left craniotomy for evacuation of subdural hematoma. Postoperatively, the patient was noted to have a new onset focal seizures involving the right face on 1/4. Ativan was given and Keppra dose was increased to 1,000 mg BID. A CT scan of the head was repeated which showed expected postoperative changes of left subdural hematoma evacuation with no significant change in size of mixed density left subdural hematoma with acute hyperdense components. Stable chronic right subdural hematoma. Stable minimal 2 mm of rightward midline shift. The patient continued to have seizures during the day requiring ativan and an additional dose of Keppra. Vimpat was added for seizure management. An EEG was performed on 1/4 which showed some moderate to severe generalized encephalopathic process with periodic sharply contoured waves. No electrographic seizures were recorded. Reportedly, the patient had intermittent generalized seizures overnight requiring prn ativan. Patient received 8 mg of ativan overnight per report. The patient is now on a 24 hour EEG and Neurology has been consulted for further management. There has been no reported seizures this morning. Neuro exam is limited due to altered mental status. The patient is lethargic and unable to open her eyes. She withdraws to pain, but is non-verbal. No command following. Unable to obtain a ROS due to mental status. Past Medical History:   Diagnosis Date    Endocrine disease     hypothyroidism    Psychiatric disorder     Down Syndrome     Past Surgical History:   Procedure Laterality Date    HX CHOLECYSTECTOMY        History reviewed. No pertinent family history.   Social History     Tobacco Use    Smoking status: Never Smoker    Smokeless tobacco: Never Used   Substance Use Topics    Alcohol use: No      Current Facility-Administered Medications   Medication Dose Route Frequency Provider Last Rate Last Dose    dextrose 10% 10 % infusion             dextrose 10 % infusion 125-250 mL  125-250 mL IntraVENous PRN Genevabhavna Galeas,  mL/hr at 01/05/20 0705 125 mL at 01/05/20 0705    levETIRAcetam (KEPPRA) 1,000 mg in 0.9% sodium chloride 100 mL IVPB  1,000 mg IntraVENous Q12H Tricia Ya MD   1,000 mg at 01/05/20 0942    LORazepam (ATIVAN) injection 2 mg  2 mg IntraVENous Q1H PRN Tricia Ya MD   2 mg at 01/05/20 6301    famotidine (PF) (PEPCID) 20 mg in 0.9% sodium chloride 10 mL injection  20 mg IntraVENous Q12H Ruthy Schlatter MD [facility]   20 mg at 01/05/20 0215    miconazole (MICOTIN) 2 % powder   Topical BID Negra Dietz MD        lacosamide (VIMPAT) 100 mg in 0.9% sodium chloride 100 mL IVPB  100 mg IntraVENous Q12H Negra iDetz MD   100 mg at 01/05/20 0816    levothyroxine (SYNTHROID) tablet 75 mcg  75 mcg Oral ACB Lucia Burrell MD   Stopped at 01/05/20 0730    loratadine (CLARITIN) tablet 10 mg  10 mg Oral DAILY Lucia Burrell MD   Stopped at 01/03/20 2387    traZODone (DESYREL) tablet 50 mg  50 mg Oral QPM Lucia Burrell MD   Stopped at 01/04/20 1800    sodium chloride (NS) flush 5-40 mL  5-40 mL IntraVENous Q8H Lucia Burrell MD   10 mL at 01/05/20 0551    sodium chloride (NS) flush 5-40 mL  5-40 mL IntraVENous PRN Lucia Burrell MD        0.9% sodium chloride infusion  75 mL/hr IntraVENous CONTINUOUS Lucia Burrell MD 75 mL/hr at 01/04/20 1814 75 mL/hr at 01/04/20 1814    acetaminophen (TYLENOL) solution 650 mg  650 mg Oral Q4H PRN Lucia Burrell MD        HYDROcodone-acetaminophen [facility]) 5-325 mg per tablet 1 Tab  1 Tab Oral Q4H PRN Lucia Burrell MD        ondansetron [facility]) injection 4 mg  4 mg IntraVENous Q4H PRN Lucia Burrell MD        niCARdipine (CARDENE) 25 mg in 0.9% sodium chloride 250 mL infusion  5-15 mg/hr IntraVENous TITRATE Lucia Burrell MD   Stopped at 01/03/20 0400    labetalol (NORMODYNE;TRANDATE) injection 10 mg  10 mg IntraVENous Q15MIN PRN Lucia Burrell MD        Or    hydrALAZINE (APRESOLINE) 20 mg/mL injection 10 mg  10 mg IntraVENous Q15MIN PRN Lucia Burrell MD        docusate sodium (COLACE) capsule 100 mg  100 mg Oral BID Lucia Burrell MD   Stopped at 01/04/20 1728    fentaNYL citrate (PF) injection 25-50 mcg  25-50 mcg IntraVENous Q1H PRN Negra Dietz MD   25 mcg at 01/03/20 1120    QUEtiapine (SEROquel) tablet 25 mg  25 mg Oral QHS Killian Clos H, DO   Stopped at 01/04/20 2200    sodium chloride (NS) flush 5-40 mL  5-40 mL IntraVENous Q8H Roberta Hendrix H, DO   10 mL at 01/05/20 2783    sodium chloride (NS) flush 5-40 mL  5-40 mL IntraVENous PRN Lexy Amaral DO        naloxone [facility]) injection 0.4 mg  0.4 mg IntraVENous PRN Lexy Amaral DO            No Known Allergies    Review of Systems:  Review of systems not obtained due to patient factors. Objective:     Vitals:    01/05/20 0648 01/05/20 0700 01/05/20 0800 01/05/20 0900   BP:  91/43 (!) 88/47 116/58   Pulse:  74 68 68   Resp:  12 14 13   Temp:   98.4 °F (36.9 °C)    SpO2:  94% 97% 94%   Weight: 136 lb 11 oz (62 kg)           Physical Exam:  GENERAL: lethargic, NAD  EYE: conjunctivae/corneas clear. Right pupil deviates inward. 2 mm reactive to light. Left pupil 2 mm reactive to light. NECK: neck supple and symmetrical, no carotid bruits   LUNG: clear to auscultation bilaterally  HEART: regular rate and rhythm, S1, S2 normal, no murmur, click, rub or gallop  ABDOMEN: soft, non-tender. Bowel sounds normal. No masses,  no organomegaly  EXTREMITIES:  extremities normal, atraumatic, no cyanosis or edema  SKIN: Skin warm to touch       Neurologic Exam:  Mental Status:  Lethargic, eyes do not open to stimulus. Unable to answer orientation questions. Language:    Nonverbal. Unable to assess speech. Cranial Nerves:        Right pupil deviates inward. 2 mm reactive to light. Left pupil 2 mm reactive to light. No blink to threat. Unable to assess EOMs     No obvious facial asymmetry. Motor: Withdraws to pain on all extremities      No involuntary movements. Sensation:    Withdraws to pain    Reflexes:    Diminished DTR's throughout +1. Negative Babinski reflex. Coordination & Gait: Deferred.     Recent Results (from the past 24 hour(s))   METABOLIC PANEL, COMPREHENSIVE    Collection Time: 01/05/20 12:21 AM   Result Value Ref Range    Sodium 138 136 - 145 mmol/L    Potassium 3.6 3.5 - 5.1 mmol/L    Chloride 103 97 - 108 mmol/L    CO2 28 21 - 32 mmol/L Anion gap 7 5 - 15 mmol/L    Glucose 83 65 - 100 mg/dL    BUN 9 6 - 20 MG/DL    Creatinine 0.74 0.55 - 1.02 MG/DL    BUN/Creatinine ratio 12 12 - 20      GFR est AA >60 >60 ml/min/1.73m2    GFR est non-AA >60 >60 ml/min/1.73m2    Calcium 8.0 (L) 8.5 - 10.1 MG/DL    Bilirubin, total 0.6 0.2 - 1.0 MG/DL    ALT (SGPT) 14 12 - 78 U/L    AST (SGOT) 21 15 - 37 U/L    Alk. phosphatase 83 45 - 117 U/L    Protein, total 6.4 6.4 - 8.2 g/dL    Albumin 2.5 (L) 3.5 - 5.0 g/dL    Globulin 3.9 2.0 - 4.0 g/dL    A-G Ratio 0.6 (L) 1.1 - 2.2     MAGNESIUM    Collection Time: 01/05/20 12:21 AM   Result Value Ref Range    Magnesium 1.7 1.6 - 2.4 mg/dL   CBC WITH AUTOMATED DIFF    Collection Time: 01/05/20  4:32 AM   Result Value Ref Range    WBC 6.8 3.6 - 11.0 K/uL    RBC 4.60 3.80 - 5.20 M/uL    HGB 14.6 11.5 - 16.0 g/dL    HCT 47.0 35.0 - 47.0 %    .2 (H) 80.0 - 99.0 FL    MCH 31.7 26.0 - 34.0 PG    MCHC 31.1 30.0 - 36.5 g/dL    RDW 13.4 11.5 - 14.5 %    PLATELET 520 242 - 288 K/uL    MPV 10.0 8.9 - 12.9 FL    NRBC 0.0 0  WBC    ABSOLUTE NRBC 0.00 0.00 - 0.01 K/uL    NEUTROPHILS 68 32 - 75 %    LYMPHOCYTES 14 12 - 49 %    MONOCYTES 16 (H) 5 - 13 %    EOSINOPHILS 1 0 - 7 %    BASOPHILS 1 0 - 1 %    IMMATURE GRANULOCYTES 0 0.0 - 0.5 %    ABS. NEUTROPHILS 4.6 1.8 - 8.0 K/UL    ABS. LYMPHOCYTES 1.0 0.8 - 3.5 K/UL    ABS. MONOCYTES 1.1 (H) 0.0 - 1.0 K/UL    ABS. EOSINOPHILS 0.0 0.0 - 0.4 K/UL    ABS. BASOPHILS 0.1 0.0 - 0.1 K/UL    ABS. IMM. GRANS. 0.0 0.00 - 0.04 K/UL    DF AUTOMATED     GLUCOSE, POC    Collection Time: 01/05/20  6:53 AM   Result Value Ref Range    Glucose (POC) 69 65 - 100 mg/dL    Performed by Reagan Rivera I, POC    Collection Time: 01/05/20  7:23 AM   Result Value Ref Range    Glucose (POC) 132 (H) 65 - 100 mg/dL    Performed by Norma Palmer        Imaging:  CT of Head on 1/2/2020 shows  IMPRESSION:  1.  Predominantly hypodense left cerebral convexity subdural fluid collection  with hyperdense membranes, most consistent with a chronic subdural hematoma. Mass effect results in 10 mm of rightward midline shift and descending  transtentorial herniation with effacement of the suprasellar cistern. No large  acute component is identified within the subdural hematoma. 2. Additional small right cerebral convexity hypodense subdural fluid collection  measuring up to 8 mm in maximal thickness, most consistent with an additional  chronic subdural hematoma. CTA of Head and Neck on 1/2/2020 shows  IMPRESSION:      CTA Head:  1. No evidence of significant stenosis or aneurysm. 2. Redemonstrated bilateral chronic subdural hematomas, left larger than right,  with rightward midline shift and descending transtentorial herniation.     CTA Neck:  1. No evidence of significant stenosis. CT of Head on 1/3/2020 shows  IMPRESSION:  1. Interval left frontal craniotomy and drainage of large left multiloculated  chronic subdural hematoma. 2. Postoperative findings on the left as described above. 3. Residual right frontal/temporal convexity chronic subdural hematoma without  definite interval change. 4. Near complete return of central structures to the midline. CT of Head on 1/4/2020 shows  IMPRESSION:  1. Expected postoperative changes of left subdural hematoma evacuation as above,  with no significant change in size of mixed density left subdural hematoma with  acute hyperdense components. Stable chronic right subdural hematoma. Stable  minimal 2 mm of rightward midline shift.       Hospital Problems  Date Reviewed: 1/2/2020          Codes Class Noted POA    * (Principal) SDH (subdural hematoma) ([facility] [address] ICD-10-CM: I73.9A8A  ICD-9-CM: 432.1  1/2/2020 Yes        Down's syndrome (Chronic) ICD-10-CM: Q90.9  ICD-9-CM: 758.0  1/2/2020 Yes        Acquired hypothyroidism (Chronic) ICD-10-CM: E03.9  ICD-9-CM: 244.9  1/2/2020 Yes              Assessment/Plan:    This is a 71year-old female with a PMH of hypothyroidism and down syndrome who presented to the hospital with altered mental status and balance issues. A CT/CTA of the head was performed showing bilateral chronic subdural hematomas, left greater than right with rightward midline shift and descending transtentorial herniation. She is s/p left craniotomy of evacuation of subdural hematoma on 1/2/2020 by Dr. Sherryle Galloway. She subsequently developed new onset seizures two days post op and has required increased doses of Keppra and prn ativan. Vimpat was also added. She had additional generalized seizures overnight per report and required PRN ativan. EEG performed on 1/4/2020 is suggestive of some moderate to severe generalized encephalopathic process with periodic sharply contoured waves. No electrographic seizures were recorded. The patient is now on a 24 hour EEG. No further seizures noted this morning. Neuro exam is limited due to altered mental status and ativan which was given overnight. She is lethargic and eyes do not open to stimulus. Withdraws to pain on all extremities. Seizures most likely secondary to chronic SDH. - continue with 100 mg of Vimpat BID and Keppra 1,000 mg BID  Further recommendations to follow by Dr. Peter Parmar. Plan discussed with Dr. Peter Parmar, Dr. Fabiana Agosto, and ICU RN. Thank you for this consult and participating in the care of this patient.       Signed By: Clayton Ernst NP     January 5, 2020

## 2020-01-05 NOTE — PROGRESS NOTES
NIGHT SHIFT UPDATE:       0464: Topher Fees with EEG on call paged.   Will arrive to hospital as soon as he can.      0528: Generalized tremorous seizure began, intermittently stopping a

## 2020-01-06 PROBLEM — R56.9 SEIZURES (HCC): Status: ACTIVE | Noted: 2020-01-01

## 2020-01-06 NOTE — PROGRESS NOTES
NUTRITION       Malnutrition Screening Tool (MST) triggered RD referral based on results obtained during nursing admission assessment. The patient's chart was reviewed and nutrition assessment is not indicated at this time. Plan to see patient for nutrition care needs as indicated. Thank you.         Wt Readings from Last 5 Encounters:   01/05/20 62 kg (136 lb 11 oz)   01/02/20 66 kg (145 lb 8.1 oz)   04/28/18 68 kg (150 lb)   02/24/18 70.7 kg (155 lb 13.8 oz)   ]    Rose Cottrell RD [facility]

## 2020-01-06 NOTE — PROCEDURES
[address]   EEG    Name:  Caroline Saravia  MR#:  [id number]  :  1955  ACCOUNT #:  [de-identified]  DATE OF SERVICE:  2020      REQUESTING PHYSICIAN:  Cyndee Brown DO    HISTORY:  The patient is a 26-year-old female with left frontal subdural hematoma, status post evacuation, who is being evaluated for recurrent seizure-like activity involving twitching of the face and extremities. DESCRIPTION:  This is an 18-channel EEG with prolonged video monitoring performed on 2020. The recording starts at 12:56 a.m. and continues until 12:55 a.m. on 2020. The dominant posterior background rhythm consists of medium voltage rhythms in the 6-7 Hz frequency range out of the posterior head region. Intermittently, faster beta frequencies were seen superimposed on the background rhythms. There is frequent generalized frontally dominant bursts of rhythmic appearing delta slowing occurring in bursts. A rare sharp wave discharge was seen in the left frontal area. The patient was noted to have some restlessness intermittently, but no seizure-like activity was noted on the video. Drowsiness and sleep architecture was characterized by slowing and vertex waves/sleep spindles in the central region. EEG SUMMARY:  Abnormal EEG due to,  1. Moderate slowing of the background rhythms. 2.  A rare sharp wave discharge seen in the left frontal area. CLINICAL INTERPRETATION:  This EEG shows a generalized encephalopathic process, nonspecific in type. There is a rare epileptiform potential seen in the left frontal area which could represent an underlying partial-onset seizure focus. However, no ongoing electrographic or clinical seizure activity was noted during this at 24-hour period of monitoring.         Red Blanco MD      [id number]  D:  2020 10:41  T:  2020 11:31  JOB #:  [id number]

## 2020-01-06 NOTE — PROGRESS NOTES
SLP Contact Note    Attempted to see patient for swallow treatment. Patient not responsive per RN and therefore will hold SLP intervention for now.       Thank you,  ZACK EsquedaEd, [id number] [facility]  Speech-Language Pathologist

## 2020-01-06 NOTE — PROGRESS NOTES
Physical therapy    Chart reviewed in prep for PT treatment. RN reporting patient with several seizures overnight and given Ativan. Patient not following commands. RN reporting patient not appropriate for therapy at this time. Will defer and f/u later today as able and appropriate. Thank you.        Ramy Bliss, PT, DPT

## 2020-01-06 NOTE — PROGRESS NOTES
Neurology Progress Note  BECCA Romero  Neurocritical Care/NIS/Neurology NP - [facility] Cell: [phone]      Date of admission: 1/2/2020    Patient: Hugo Lynn MRN: [id number]  SSN: [id number]    YOB: 1955  Age: 71 y.o. Sex: female      Chief complaint: SDH complicated by seizures    Subjective:     HPI: Hugo Lynn is a 71 y.o. female 71year old female with Down's syndrome, chronic SDH admitted with encephalopathy, now s/p evacuation of L SDH 1/3/20. Neurology consulted due to reported focal (R facial twitching) as well as questionable generalized seizure activity b/l UE and face noted on 1/4. CT head 1/4 with L SDH evacuation, postoperative findings, L SDH largely unchanged, stable chronic R SDH, unchanged 2mm shift. EEG 1/4 with moderate to severe encephalopathy, intermittent sharply contoured waves without electrographic seizure activity. She is now maintained on LEV 1g BID and Vimpat 100mg BID. She received a total of 7 mg IV ativan between 4 pm and 11 pm on 1/4 for seizure like activity. She received a total of 4 mg IV ativan at about 6 am on 1/5 for seizure like activity. However the seizure activity was in the LLE only. Prior to Admission medications    Medication Sig Start Date End Date Taking? Authorizing Provider   calcium-cholecalciferol, d3, (CALCIUM 600 + D) 600-125 mg-unit tab Take  by mouth. Coleman Fournier MD   QUEtiapine (SEROQUEL) 25 mg tablet Take 25 mg by mouth two (2) times a day. Coleman Fournier MD   trazodone HCl (TRAZODONE PO) Take  by mouth. Coleman Fournier MD   loratadine (CLARITIN) 10 mg tablet Take 1 Tab by mouth daily. 4/28/18   [place] Shelley MD   aspirin 81 mg chewable tablet Take 81 mg by mouth daily. Coleman Fournier MD   levothyroxine (SYNTHROID) 75 mcg tablet Take 75 mcg by mouth Daily (before breakfast).     Coleman Fournier MD     Medication Dose Route Frequency    dextrose 10 % infusion 125-250 mL  125-250 mL IntraVENous PRN    levETIRAcetam (KEPPRA) 1,000 mg in 0.9% sodium chloride 100 mL IVPB  1,000 mg IntraVENous Q12H    LORazepam (ATIVAN) injection 2 mg  2 mg IntraVENous Q1H PRN    famotidine (PF) (PEPCID) 20 mg in 0.9% sodium chloride 10 mL injection  20 mg IntraVENous Q12H    miconazole (MICOTIN) 2 % powder   Topical BID    lacosamide (VIMPAT) 100 mg in 0.9% sodium chloride 100 mL IVPB  100 mg IntraVENous Q12H    levothyroxine (SYNTHROID) tablet 75 mcg  75 mcg Oral ACB    loratadine (CLARITIN) tablet 10 mg  10 mg Oral DAILY    traZODone (DESYREL) tablet 50 mg  50 mg Oral QPM    sodium chloride (NS) flush 5-40 mL  5-40 mL IntraVENous Q8H    sodium chloride (NS) flush 5-40 mL  5-40 mL IntraVENous PRN    0.9% sodium chloride infusion  75 mL/hr IntraVENous CONTINUOUS    acetaminophen (TYLENOL) solution 650 mg  650 mg Oral Q4H PRN    HYDROcodone-acetaminophen (NORCO) 5-325 mg per tablet 1 Tab  1 Tab Oral Q4H PRN    ondansetron (ZOFRAN) injection 4 mg  4 mg IntraVENous Q4H PRN    niCARdipine (CARDENE) 25 mg in 0.9% sodium chloride 250 mL infusion  5-15 mg/hr IntraVENous TITRATE    labetalol (NORMODYNE;TRANDATE) injection 10 mg  10 mg IntraVENous Q15MIN PRN    hydrALAZINE (APRESOLINE) 20 mg/mL injection 10 mg  10 mg IntraVENous Q15MIN PRN    docusate sodium (COLACE) capsule 100 mg  100 mg Oral BID    fentaNYL citrate (PF) injection 25-50 mcg  25-50 mcg IntraVENous Q1H PRN    QUEtiapine (SEROquel) tablet 25 mg  25 mg Oral QHS    sodium chloride (NS) flush 5-40 mL  5-40 mL IntraVENous Q8H    sodium chloride (NS) flush 5-40 mL  5-40 mL IntraVENous PRN    naloxone (NARCAN) injection 0.4 mg  0.4 mg IntraVENous PRN        No Known Allergies    Review of systems  Unable due to mental status    Objective:     Vitals:    01/05/20 1900 01/05/20 2000 01/05/20 2100 01/05/20 2200   BP: 126/76 (!) 77/61 132/80 136/66   Pulse: 81 91 98 91   Resp: 17 18 15 14   Temp:  97.9 °F (36.6 °C)     SpO2: 94% 94% 90% 100%        Temp (24hrs), Av.9 °F (36.6 °C), Min:96.5 °F (35.8 °C), Max:98.8 °F (37.1 °C)        O2 Device: Nasal cannula  O2 Flow Rate (L/min): 2 l/min      Intake/Output Summary (Last 24 hours) at 2020 0136  Last data filed at 2020 2200  Gross per 24 hour   Intake 3105 ml   Output 1085 ml   Net 2020 ml       General: In NAD. Cardiac: NSR. RRR, no significant R/G/M. Lungs: CTA, unlabored breathing. Abdomen: Soft/NT/non-distended, BS present. : [place] device. Otherwise deferred  Extremities. Mild R hand edema, likely from PIV. Palpable pulses. Skin: Good Turgor, warm dry skin, no significant rashes, ecchymoses, wounds or ulcers. Neurologic Exam:  Does not open eyes. Purposeful with UEs. Non-verbal and not FCs. Does not regard when eyes opened. Pupils equal, round and reactive to light. R eye esotropic at baseline. L eye midline gaze. Bulk and tone normal. Coordinated purposeful movements with bilateral upper exts (trying to remove NC, PIV, EEG leads). No involuntary movements. Withdraws to nox stim in all 4s. LABS:  Recent Labs     20  0432 20   WBC 6.8 6.4 8.0   HGB 14.6 13.3 14.1   HCT 47.0 41.3 43.1    227 210     Recent Labs     20  002205 20    141 139   K 3.6 3.3* 4.0    106 107   CO2 28 29 24   BUN 9 12 11   CREA 0.74 0.70 0.76   GLU 83 95 119*   CA 8.0* 8.2* 8.0*   MG 1.7 2.1 1.9   PHOS  --  2.4* 3.0     Recent Labs     20  0021 20   SGOT 21 36   ALT 14 25   AP 83 91   TBILI 0.6 0.6   TP 6.4 6.5   ALB 2.5* 2.7*   GLOB 3.9 3.8     Recent Labs     20  0518   INR 1.1   PTP 11.1   APTT 23.6        No results for input(s): PHI, PCO2I, PO2I, HCO3I in the last 72 hours. No results for input(s): CPK, CKNDX, TROIQ in the last 72 hours.     No lab exists for component: CPKMB  Lab Results   Component Value Date/Time    Cholesterol, total 200 (H) 2009 11:48 AM    HDL Cholesterol 65 (H) 2009 11:48 AM    LDL, calculated 108 (H) 11/02/2009 11:48 AM    Triglyceride 135 11/02/2009 11:48 AM    CHOL/HDL Ratio 3.1 11/02/2009 11:48 AM     Lab Results   Component Value Date/Time    Glucose (POC) 132 (H) 01/05/2020 07:23 AM    Glucose (POC) 69 01/05/2020 06:53 AM     Lab Results   Component Value Date/Time    Color YELLOW/STRAW 01/02/2020 02:31 PM    Appearance CLEAR 01/02/2020 02:31 PM    Specific gravity 1.020 01/02/2020 02:31 PM    pH (UA) 7.0 01/02/2020 02:31 PM    Protein NEGATIVE  01/02/2020 02:31 PM    Glucose NEGATIVE  01/02/2020 02:31 PM    Ketone NEGATIVE  01/02/2020 02:31 PM    Bilirubin NEGATIVE  01/02/2020 02:31 PM    Urobilinogen 1.0 01/02/2020 02:31 PM    Nitrites NEGATIVE  01/02/2020 02:31 PM    Leukocyte Esterase NEGATIVE  01/02/2020 02:31 PM    Epithelial cells FEW 01/02/2020 02:31 PM    Bacteria NEGATIVE  01/02/2020 02:31 PM    WBC 0-4 01/02/2020 02:31 PM    RBC 0-5 01/02/2020 02:31 PM         Imaging:  No results found. CT Results:  Results from Hospital Encounter encounter on 01/02/20   CT HEAD WO CONT    Narrative EXAM:  CT HEAD WO CONT    INDICATION:   post op craniotomy    COMPARISON: CT head 1/30/2020. TECHNIQUE: Unenhanced CT of the head was performed using 5 mm images. Brain and  bone windows were generated. CT dose reduction was achieved through use of a  standardized protocol tailored for this examination and automatic exposure  control for dose modulation. FINDINGS:  Stable postsurgical changes of left frontal craniotomy and chronic subdural  hematoma evacuation, with a left-sided subdural drain in place. No significant  change in size of mixed density left cerebral convexity subdural hematoma with  acute hyperdense components, measuring up to 11 mm in maximal thickness. Scattered left cerebral pneumocephalus is unchanged. There is unchanged minimal  2 mm of rightward midline shift.  There is an unchanged chronic right subdural  hematoma measuring up to 11 mm in maximal thickness. No acute hyperdense  component is identified within this subdural hematoma. The ventricles are normal in size and position. Basilar cisterns are patent. No  evidence of acute infarct. The paranasal sinuses, mastoid air cells, and middle  ears are clear. Unchanged medially deviated right globe. Impression IMPRESSION:  1. Expected postoperative changes of left subdural hematoma evacuation as above,  with no significant change in size of mixed density left subdural hematoma with  acute hyperdense components. Stable chronic right subdural hematoma. Stable  minimal 2 mm of rightward midline shift. CT HEAD WO CONT    Narrative EXAM:  CT HEAD WO CONT  INDICATION:  assess subdural hematoma post drainage. TECHNIQUE:   Thin axial images were obtained through the calvarium and saved with standard  and bone window algorithm. Coronal and sagittal reconstructions were generated. CT dose reduction was achieved through use of a standardized protocol tailored  for this examination and automatic exposure control for dose modulation. COMPARISON: CT head yesterday. Previous CT head 11/20/18  FINDINGS:  Since the prior CT patient is undergone a left frontal craniotomy for treatment  of large chronic primarily left-sided multiloculated subdural hematoma which had  significant mass effect, developing herniation as well as developing right-sided  hydrocephalus. Postoperative findings are demonstrated with subdural drain in place. Residual  extra-axial collection on the left has higher density consistent with more acute  blood was some gas/air. Some root persistent effacement of left convexity sulci. This represents a marked interval improvement from the preoperative exam and  there has been near complete return of central structures to the midline. There is a chronic right subdural hematoma with some effacement of the sulci.   This appears somewhat more prominent on today's exam, however it is likely in  part related to interval decompression of the brain and expansion causing  increased constant acuity of the right sided subdural hematoma over the frontal  and temporal convexity. Small amount of high density blood in the dependent  posterior right subdural similar to the preoperative exam with no interval new  hemorrhage demonstrated. Otherwise, cerebral density is within normal limits with no evidence of acute  brain infarction. Impression IMPRESSION:  1. Interval left frontal craniotomy and drainage of large left multiloculated  chronic subdural hematoma. 2. Postoperative findings on the left as described above. 3. Residual right frontal/temporal convexity chronic subdural hematoma without  definite interval change. 4. Near complete return of central structures to the midline. Results from [place] encounter on 01/02/20   CT HEAD WO CONT    Narrative EXAM:  CT HEAD WO CONT    INDICATION:   ams    COMPARISON: CT head 1/2/2020. TECHNIQUE: Unenhanced CT of the head was performed using 5 mm images. Brain and  bone windows were generated. CT dose reduction was achieved through use of a  standardized protocol tailored for this examination and automatic exposure  control for dose modulation. FINDINGS:  Predominantly hypodense to the left cerebral convexity subdural fluid collection  measuring up to 2.0 cm in maximal thickness. There are hyperdense septations  noted within the fluid collection, indicating a chronic component. No other  obvious hyperdense components are identified. There is mass effect on the  adjacent brain parenchyma, and resultant 10 mm of rightward midline shift. There  is also descending transtentorial herniation with effacement of the suprasellar  cistern. There is a small right cerebral convexity hypodense subdural fluid collection  which measures up to 8 mm in maximal thickness along the frontal convexity.   There is mild local mass effect on the adjacent brain parenchyma. There is no acute infarct. The paranasal sinuses, mastoid air cells, and middle  ears are clear. The orbital contents are within normal limits. Disconjugate gaze  with medial deviation of the right globe. There are no significant osseous or  extracranial soft tissue lesions. Impression IMPRESSION:  1. Predominantly hypodense left cerebral convexity subdural fluid collection  with hyperdense membranes, most consistent with a chronic subdural hematoma. Mass effect results in 10 mm of rightward midline shift and descending  transtentorial herniation with effacement of the suprasellar cistern. No large  acute component is identified within the subdural hematoma. 2. Additional small right cerebral convexity hypodense subdural fluid collection  measuring up to 8 mm in maximal thickness, most consistent with an additional  chronic subdural hematoma. The findings were called to Aliya Ewing on 1/2/2020 at 4:11 PM by Dr. Mahnaz Presley. [id number]         MRI Results:  No results found for this or any previous visit. Hospital Problems  Date Reviewed: 1/2/2020          Codes Class Noted POA    [facility]iz[facility] ([facility] ICD-10-CM: R56.9  ICD-9-CM: 780.39  1/6/2020 Unknown        * (Principal) SDH (subdural hematoma) ([facility] ICD-10-CM: B34.9P8V  ICD-9-CM: 432.1  1/2/2020 Yes        Down's syndrome (Chronic) ICD-10-CM: Q90.9  ICD-9-CM: 758.0  1/2/2020 Yes        Acquired hypothyroidism (Chronic) ICD-10-CM: E03.9  ICD-9-CM: 244.9  1/2/2020 Yes              Assessment/Plan:     Continuing to have intermittent seizure like activity. At [address] I witnessed LLE rhythmic jerking c/w seizure. Resolved with 2 mg ativan IV. Then about an hour later at [address] 1/6, she had RLE rhythmic jerking c/w seizure. 2 mg ativan IV given, but RLE rhythmic jerking resumed about 30 minutes later and was given another 2 mg ativan. At that point gave her 2000 mg bolus of Keppra and increased standing dose to 1500 mg bid.  Unfortunately multiple EEG leads have come off and the EEG recording does not appear to have much quality data. From 0020 on, since the first seizure like activity that I witnessed, the patient has been more obtunded than the neuro exam documented above, which was prior to the seizure like activity. She also having low grade fevers, and I've also seen her have \"chills\" - ie tremulous on and off bilaterally which may be confused with seizure activity as well. As of 4:40 am, no further reports of seizure like activity since the Keppra bolus. -F/U EEG  -Keppra increased to 1500 mg bid  -Continue Vimpat 100 mg bid    Final Recs to follow from Dr. Roro Meng    Thank you for this consult and for the opportunity to participate in the care of this patient.     Signed By: Glenn Macdonald NP     January 6, 2020 1:05 AM

## 2020-01-06 NOTE — PROCEDURES
PICC Placement Note    PRE-PROCEDURE VERIFICATION  Correct Procedure: yes  Correct Site:  yes  Temperature: Temp: 100.4 °F (38 °C), Temperature Source: Temp Source: Axillary  Recent Labs     01/06/20  0216   BUN 7   CREA 0.49*      WBC 8.2       Allergies: Patient has no known allergies. Education materials, including PICC Booklet and written information regarding central catheter related bloodstream infection and prevention given to patient. See Patient Education activity for further details. PROCEDURE DETAIL  A triple lumen power injectable PICC line was started for vascular access. The following documentation is in addition to the PICC properties in the lines/airways flowsheet :  Lot #: [id number]  Xylocaine 1% used intradermally  Total Catheter Length: 32 (cm)  External Catheter Length: 1 (cm)  Circumference: 27 (cm)  Vein Selection for PICC: right basilic  Central Line Bundle followed: yes  Complication Related to Insertion: none    The placement was verified by ECG technology. The PICC is on the right side and the tip overlies the superior vena cava. ECG verification documentation is on the patient's paper chart. Line is okay to use. Report to VISHNU Sam, RN, [facility]-BC   Vascular Access Team

## 2020-01-06 NOTE — PROGRESS NOTES
0730: Bedside shift change report given to Randy Gloria RN (oncoming nurse) by [facility], RN (offgoing nurse). Report included the following information SBAR, Kardex, Procedure Summary, Intake/Output, MAR, Recent Results, Cardiac Rhythm NSR, Alarm Parameters  and Dual Neuro Assessment. PCT/Sitter at the bedside. Continuous EEG monitoring remains in place. 1000: PICC team in room to place vascular access. 1130: ICU multidisciplinary rounds lead by Dr. Tosin Vargas): The following were reviewed and discussed: current labs,  recent imaging, lines/drains, review of body systems, nutrition, cultures, mobility, length of ICU stay. The plan of care for the day is as follows : PICC placement, aggressive NT suctioning, Cough/Deep Breathe, Dobhoff placement for feeding. 1530: 10 Fr. Dobhoff placed in right nare without difficulty, pt tolerated procedure well. X-ray completed and placement confirmed with Dr. Radha Addison. Shift summary: Pt's TF started at 10ml/hr with q6hour flushes. Pt more responsive to voice this afternoon, opening eyes slightly, and moving extremities spontaneously. No seizure activity noted for duration of this shift. 1930: Bedside shift change report given to Antonio Anderson RN (oncoming nurse) by Randy Gloria RN (offgoing nurse). Report included the following information SBAR, Kardex, Procedure Summary, Intake/Output, MAR, Recent Results, Cardiac Rhythm NSR, Alarm Parameters  and Dual Neuro Assessment.

## 2020-01-06 NOTE — PROGRESS NOTES
Neurosurgery Progress Note  Med Addison ACNP-BC  [phone]        Admit Date: 2020   LOS: 4 days        Daily Progress Note: 2020    POD:4 Day Post-Op    S/P: Procedure(s):  CRANIOTOMY LEFT FRONTAL FOR SUBDURAL HEMATOMA    HPI: The patient has a history of Down's syndrome. She was brought to the ER by caregivers due to lethargy and confusion. She also has been having balance issues and difficulty standing recently. She was found to have a large chronic SDH on the left with membranes in it. She transferred to [facility] where she was taken to the OR by Dr. Holland Alvarado for a craniotomy to evacuate the SDH. Subjective:   Over the weekend, the patient had issues with seizures. Since  on Saturday, she has received 15 mg of IV Ativan. She also received 2 g of Keppra and is on maintenance Keppra of 1500 mg bid and Vimpat 100 mg bid. She was placed on a 24 hour continuous EEG, but some of her leads fell off overnight. Unable to obtain ROS due to generalized encephalopathy. Objective:     Vital signs  Temp (24hrs), Av.9 °F (37.2 °C), Min:96.5 °F (35.8 °C), Max:100.6 °F (38.1 °C)    07 -  1900  In: 50 [I.V.:50]  Out: -    190 -  0700  In: 1850 [I.V.:4305]  Out: 2385 [Urine:2350; Drains:35]    Visit Vitals  /61 (BP 1 Location: Right arm, BP Patient Position: At rest)   Pulse 93   Temp 100.4 °F (38 °C)   Resp 15   Wt 62 kg (136 lb 11 oz)   SpO2 97%   BMI 34.21 kg/m²    O2 Flow Rate (L/min): 2 l/min O2 Device: Nasal cannula     Pain control  Pain Assessment  Pain Scale 1: Adult Nonverbal Pain Scale    PT/OT  Gait     Gait  Base of Support: Widened  Speed/Sharmila: Shuffled  Gait Abnormalities: Decreased step clearance, Trendelenburg, Trunk sway increased, Shuffling gait  Ambulation - Level of Assistance: Minimal assistance, Assist x2  Distance (ft): 30 Feet (ft)  Assistive Device: Gait belt(bilateral HHA)           Physical Exam:  Gen:NAD. EEG leads in place.   Neuro: Obtunded. Does not follow commands. Not talking to me. Eventually opens eyes to pain. Pupils pinpoint. Right eye medial deviation. Dysconjugate gaze. RUE localizes. Withdraws in BLE, LUE. Skin: Left scalp incision C/D/I with staples in place. CT head without contrast on 01/02/20 shows predominantly hypodense left cerebral convexity subdural fluid collection with hyperdense membranes, most consistent with a chronic subdural hematoma. Mass effect results in 10 mm of rightward midline shift and descending transtentorial herniation with effacement of the suprasellar cistern. No large acute component is identified within the subdural hematoma. Additional small right cerebral convexity hypodense subdural fluid collection measuring up to 8 mm in maximal thickness, most consistent with an additional chronic subdural hematoma. CT head without contrast on 01/03/20 shows interval left frontal craniotomy and drainage of large left multiloculated chronic subdural hematoma. Postoperative findings on the left. Residual right frontal/temporal convexity chronic subdural hematoma without definite interval change. Near complete return of central structures to the midline. CT head without contrast on 01/04/20 shows expected postoperative changes of left subdural hematoma evacuation as above, with no significant change in size of mixed density left subdural hematoma with acute hyperdense components. Stable chronic right subdural hematoma. Stable minimal 2 mm of rightward midline shift.     24 hour results:    Recent Results (from the past 24 hour(s))   METABOLIC PANEL, BASIC    Collection Time: 01/06/20  2:16 AM   Result Value Ref Range    Sodium 137 136 - 145 mmol/L    Potassium 3.2 (L) 3.5 - 5.1 mmol/L    Chloride 105 97 - 108 mmol/L    CO2 25 21 - 32 mmol/L    Anion gap 7 5 - 15 mmol/L    Glucose 80 65 - 100 mg/dL    BUN 7 6 - 20 MG/DL    Creatinine 0.49 (L) 0.55 - 1.02 MG/DL    BUN/Creatinine ratio 14 12 - 20      GFR est AA >60 >60 ml/min/1.73m2    GFR est non-AA >60 >60 ml/min/1.73m2    Calcium 7.4 (L) 8.5 - 10.1 MG/DL   CBC WITH AUTOMATED DIFF    Collection Time: 01/06/20  2:16 AM   Result Value Ref Range    WBC 8.2 3.6 - 11.0 K/uL    RBC 4.14 3.80 - 5.20 M/uL    HGB 12.9 11.5 - 16.0 g/dL    HCT 40.0 35.0 - 47.0 %    MCV 96.6 80.0 - 99.0 FL    MCH 31.2 26.0 - 34.0 PG    MCHC 32.3 30.0 - 36.5 g/dL    RDW 12.7 11.5 - 14.5 %    PLATELET 115 303 - 716 K/uL    MPV 9.8 8.9 - 12.9 FL    NRBC 0.0 0  WBC    ABSOLUTE NRBC 0.00 0.00 - 0.01 K/uL    NEUTROPHILS 85 (H) 32 - 75 %    LYMPHOCYTES 6 (L) 12 - 49 %    MONOCYTES 8 5 - 13 %    EOSINOPHILS 1 0 - 7 %    BASOPHILS 1 0 - 1 %    IMMATURE GRANULOCYTES 0 0.0 - 0.5 %    ABS. NEUTROPHILS 6.9 1.8 - 8.0 K/UL    ABS. LYMPHOCYTES 0.5 (L) 0.8 - 3.5 K/UL    ABS. MONOCYTES 0.7 0.0 - 1.0 K/UL    ABS. EOSINOPHILS 0.1 0.0 - 0.4 K/UL    ABS. BASOPHILS 0.0 0.0 - 0.1 K/UL    ABS. IMM. GRANS. 0.0 0.00 - 0.04 K/UL    DF AUTOMATED     GLUCOSE, POC    Collection Time: 01/06/20  2:20 AM   Result Value Ref Range    Glucose (POC) 69 65 - 100 mg/dL    Performed by Analy Schilling    GLUCOSE, POC    Collection Time: 01/06/20  3:07 AM   Result Value Ref Range    Glucose (POC) 99 65 - 100 mg/dL    Performed by Analy Schilling           Assessment:     Principal Problem:    SDH (subdural hematoma) ([facility] (1/2/2020)    Active Problems:    Down's syndrome (1/2/2020)      Acquired hypothyroidism (1/2/2020)      [facility] (1/6/2020)        Plan:   1. Chronic subdural hematoma   - s/p crani 01/02   - neuro checks every 2 hours   - PT/OT/Speech as able   - Cont Keppra  2. Brain compression   - due to #1   - plans as above  3. Down's syndrome   - Sitter PRN   -  Supportive care  4. Hypothyroidism    - cont levothyroxine from home  5. Seizures   - Neurology consulted   - Pt on Keppra 1500 mg bid and Vimpat 100 mg bid   - Ativan PRN  6.  Acute metabolic encephalopathy   - due to #5 and likely due to medications given for seizures   - plans as above    Activity: up with assist  DVT ppx: SCDs  Dispo: tbd    Plan d/w Dr. Ama Rose. No further surgical intervention at this time. Hoping to get patient through this period with the seizures and to allow residual blood to reabsorb on its own. Will await input from neurology today.        Kaylie Smith NP

## 2020-01-06 NOTE — PROGRESS NOTES
Spiritual Care Assessment/Progress Note  ST. [address]      NAME: Hugo Lynn      MRN: [id number]  AGE: 71 y.o. SEX: female  Anabaptism Affiliation: Religion   Language: English     1/6/2020     Total Time (in minutes): 5     Spiritual Assessment begun in [address] [facility] through conversation with:         []Patient        [] Family    [] Friend(s)        Reason for Consult: Initial visit     Spiritual beliefs: (Please include comment if needed)     [] Identifies with a radha tradition:         [] Supported by a radha community:            [] Claims no spiritual orientation:           [] Seeking spiritual identity:                [] Adheres to an individual form of spirituality:           [x] Not able to assess:                           Identified resources for coping:      [] Prayer                               [] Music                  [] Guided Imagery     [] Family/friends                 [] Pet visits     [] Devotional reading                         [] Unknown     [] Other:                                             Interventions offered during this visit: (See comments for more details)    Patient Interventions: Initial visit           Plan of Care:     [] Support spiritual and/or cultural needs    [] Support AMD and/or advance care planning process      [] Support grieving process   [] Coordinate Rites and/or Rituals    [] Coordination with community clergy   [] No spiritual needs identified at this time   [] Detailed Plan of Care below (See Comments)  [] Make referral to Music Therapy  [] Make referral to Pet Therapy     [] Make referral to Addiction services  [] Make referral to [facility]  [] Make referral to Spiritual Care Partner  [] No future visits requested        [x] Follow up visits as needed     Attempted to visit pt on ICU without success. Pt sleeping and no family present. Chaplains will continue to offer support as needed.   Chaplain Benz MDiv, MS, [address]  [phone] [place] [phone])

## 2020-01-06 NOTE — PROGRESS NOTES
[facility]    ICU TEAM Progress Note    Name: Boaz Vasquez   : 1955   MRN: [id number]   Date: 2020      Subjective:   Progress Note: 2020    CC: Altered mental status  Reason for ICU Admission:   Chronic SDH  Craniotomy for drain SHD  Down Syndrome  Anxiety    Overnight Events:  Received 2g keppra overnight at 2AM, increased Keppra to 1500mg BID, 6mg lorazepam given overnight    POD:1 Day Post-Op    S/P: Procedure(s):  CRANIOTOMY LEFT FRONTAL FORSUBDURAL HEMATOMA    Active Problem List:     Problem List  Date Reviewed: 2020          Codes Class    Seizures ([facility] ICD-10-CM: R56.9  ICD-9-CM: 780.39         * (Principal) SDH (subdural hematoma) ([facility] ICD-10-CM: E79.8E7K  ICD-9-CM: 432.1         Down's syndrome (Chronic) ICD-10-CM: Q90.9  ICD-9-CM: 758.0         Acquired hypothyroidism (Chronic) ICD-10-CM: E03.9  ICD-9-CM: 244.9               Past Medical History:      has a past medical history of Endocrine disease and Psychiatric disorder. Past Surgical History:      has a past surgical history that includes hx cholecystectomy.     Medications:     Current Facility-Administered Medications   Medication Dose Route Frequency    levETIRAcetam (KEPPRA) 1,500 mg in 0.9% sodium chloride 100 mL IVPB  1,500 mg IntraVENous Q12H    acetaminophen (TYLENOL) suppository 650 mg  650 mg Rectal Q4H PRN    magnesium sulfate 1 g/100 ml IVPB (premix or compounded)  1 g IntraVENous ONCE    [START ON 2020] levothyroxine (SYNTHROID) injection 56.25 mcg  56.25 mcg IntraVENous Q24H    dextrose 10 % infusion 125-250 mL  125-250 mL IntraVENous PRN    LORazepam (ATIVAN) injection 2 mg  2 mg IntraVENous Q1H PRN    famotidine (PF) (PEPCID) 20 mg in 0.9% sodium chloride 10 mL injection  20 mg IntraVENous Q12H    miconazole (MICOTIN) 2 % powder   Topical BID    lacosamide (VIMPAT) 100 mg in 0.9% sodium chloride 100 mL IVPB  100 mg IntraVENous Q12H    sodium chloride (NS) flush 5-40 mL  5-40 mL IntraVENous Q8H    sodium chloride (NS) flush 5-40 mL  5-40 mL IntraVENous PRN    0.9% sodium chloride infusion  75 mL/hr IntraVENous CONTINUOUS    acetaminophen (TYLENOL) solution 650 mg  650 mg Oral Q4H PRN    HYDROcodone-acetaminophen (NORCO) 5-325 mg per tablet 1 Tab  1 Tab Oral Q4H PRN    ondansetron (ZOFRAN) injection 4 mg  4 mg IntraVENous Q4H PRN    niCARdipine (CARDENE) 25 mg in 0.9% sodium chloride 250 mL infusion  5-15 mg/hr IntraVENous TITRATE    labetalol (NORMODYNE;TRANDATE) injection 10 mg  10 mg IntraVENous Q15MIN PRN    Or    hydrALAZINE (APRESOLINE) 20 mg/mL injection 10 mg  10 mg IntraVENous Q15MIN PRN    fentaNYL citrate (PF) injection 25-50 mcg  25-50 mcg IntraVENous Q1H PRN    sodium chloride (NS) flush 5-40 mL  5-40 mL IntraVENous Q8H    sodium chloride (NS) flush 5-40 mL  5-40 mL IntraVENous PRN    naloxone (NARCAN) injection 0.4 mg  0.4 mg IntraVENous PRN       Allergies/Social/Family History:     No Known Allergies   Social History     Tobacco Use    Smoking status: Never Smoker    Smokeless tobacco: Never Used   Substance Use Topics    Alcohol use: No      History reviewed. No pertinent family history. Review of Systems:     Review of systems not obtained due to patient factors. Objective:   Vital Signs:  Visit Vitals  /61 (BP 1 Location: Right arm, BP Patient Position: At rest)   Pulse 93   Temp 100.4 °F (38 °C)   Resp 15   Wt 62 kg (136 lb 11 oz)   SpO2 97%   BMI 34.21 kg/m²    O2 Flow Rate (L/min): 2 l/min O2 Device: Nasal cannula Temp (24hrs), Av.9 °F (37.2 °C), Min:96.5 °F (35.8 °C), Max:100.6 °F (38.1 °C)           Intake/Output:     Intake/Output Summary (Last 24 hours) at 2020 0820  Last data filed at 2020 0730  Gross per 24 hour   Intake 3160 ml   Output 2350 ml   Net 810 ml       Physical Exam:    General:  female, Down Syndrome features  Eye:  conjunctivae/corneas clear.  PERRL  Neurologic:  Somnolent, minimally responsive, head bandage with EEG leads   Neck:  normal and neck supple and symmetrical.     Lungs:  clear to auscultation bilaterally, diminished breath sounds R base, L base  Heart:  RR, S1, S2 normal  Abdomen:  soft, non-tender  Skin:  Warm, dry     LABS AND  DATA: Personally reviewed  Recent Labs     01/06/20  0216 01/05/20  0432   WBC 8.2 6.8   HGB 12.9 14.6   HCT 40.0 47.0    192     Recent Labs     01/06/20  0216 01/05/20  0021 01/04/20  0315    138 141   K 3.2* 3.6 3.3*    103 106   CO2 25 28 29   BUN 7 9 12   CREA 0.49* 0.74 0.70   GLU 80 83 95   CA 7.4* 8.0* 8.2*   MG  --  1.7 2.1   PHOS  --   --  2.4*     Recent Labs     01/05/20  0021   SGOT 21   AP 83   TP 6.4   ALB 2.5*   GLOB 3.9     No results for input(s): INR, PTP, APTT, INREXT, INREXT in the last 72 hours. No results for input(s): PHI, PCO2I, PO2I, FIO2I in the last 72 hours. No results for input(s): CPK, CKMB, TROIQ, BNPP in the last 72 hours. Hemodynamics:   PAP:   CO:     Wedge:   CI:     CVP:    SVR:       PVR:       Ventilator Settings:  Mode Rate Tidal Volume Pressure FiO2 PEEP                    Peak airway pressure:      Minute ventilation:          MEDS: Reviewed    CT head: personally reviewed and report checked      Assessment:     ICU Problems:  - Chronic SDH  - Tonic clonic Seizure  - Craniotomy ro drain SDH  - Down Syndrome  - Anxiety    ICU Comprehensive Plan of Care:   Plans for this Shift:   1. Neurochecks, continue EEG  2. Monitor neuro status, neurology consulted for seizure activity  3. 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 PT/OT/Speech tx  5. Hold all anxiolytics  6. Continue antiepileptics, if continue to observe seizure activity, will increase antiepileptic dosing  7. RT for pulmonary toileting, ante suction, CPT  8. Plan for PICC placement for access    Multidisciplinary Rounds Completed:   Yes    ABCDEF Bundle/Checklist  Pain Medications: Fentanyl and Acetaminophen  Target RASS: 0 - Alert & Calm - Spontaneously pays attention to caregiver  Sedation Medications: None  CAM-ICU:  unable to assess  Mobility: Fair  PT/OT: PT consulted and on board, OT consulted and on board and Speech therapy consulted and on board   Restraints: Soft wrist restraints  Discussed Plan of Care (goals of care): Yes  Addressed Code Status: Full Code    CARDIOVASCULAR  Cardiac Gtts: None  SBP Goal of: < 140 mmHg  MAP Goal of: < 90 mmHg  Transfusion Trigger (Hgb): <7 g/dL    RESPIRATORY  Vent Goals:   Aggressive bronchopulmonary hygiene  DVT Prophylaxis (if no, list reason): SCD's or Sequential Compression Device   SPO2 Goal: > 92%  Pulmonary toilet: as best as possible     GI/  Riojas Catheter Present: No  GI Prophylaxis: Pepcid (famotidine)   Nutrition: NGT placement    IVFs: discontinued  Bowel Movement: Yes  Bowel Regimen: MiraLax  Insulin:  Sliding scale PRN    ANTIBIOTICS  Antibiotics:  none    T/L/D  Tubes: Nasogastric Tube  Lines: Peripheral IV  Drains: None    SPECIAL EQUIPMENT  None    DISPOSITION  Stay in ICU    CRITICAL CARE CONSULTANT NOTE  I had a face to face encounter with the patient, reviewed and interpreted patient data including clinical events, labs, images, vital signs, I/O's, and examined patient. I have discussed the case and the plan and management of the patient's care with the consulting services, the bedside nurses and the respiratory therapist.    NOTE OF PERSONAL INVOLVEMENT IN CARE   This patient has a high probability of imminent, clinically significant deterioration, which requires the highest level of preparedness to intervene urgently. I participated in the decision-making and personally managed or directed the management of the following life and organ supporting interventions that required my frequent assessment to treat or prevent imminent deterioration. I personally spent 44 minutes of critical care time.  This is time spent at this critically ill patient's bedside actively involved in patient care as well as the coordination of care and discussions with the patient's family. This does not include any procedural time which has been billed separately.     Deloris Perales MD  1/6/2020   12:02 PM

## 2020-01-06 NOTE — PROGRESS NOTES
Occupational Therapy [id number] -   01.06.2020    Chart reviewed in prep for OT treatment. RN reporting patient with several seizures overnight and given Ativan. RN reporting patient not appropriate for therapy at this time. Will defer and f/u later today as able and appropriate. Thank you. Frederic Macedo MS, OTR/L

## 2020-01-06 NOTE — PROGRESS NOTES
NUTRITION COMPLETE ASSESSMENT    RECOMMENDATIONS:   Advance tube feeding to goal if well tolerated:     Osmolite 1.5 @ 405ml/hr x 21 hr with 1 packet Prosource daily and 120 ml water flush q 4 hr (hold tube feeding 1 hr before and 2 hr after Synthroid-once changed to the oral route)     Check BMP with phosphorus and magnesium 1/7    Interventions/Plan:   Food/Nutrient Delivery: Initiate enteral nutrition-trickle feeds     Osmolite 1.5 @ 10 ml/hr with 50 ml water flush q 6 hr    Assessment:   Reason for Assessment:   [x] Provider Consult-Tube Feeding management    Diet: NPO  Nutritionally Significant Medications: [x] Reviewed & Includes: Synthroid (IV), KCL, NS @ 75 ml/hr  Meal Intake: No data found. Subjective:  Unresponsive. Objective:  Ms Brissa August was admitted with SDH. PMHx: Down's syndrome, hypothyroidism. Noted: Chronic SDH-1/2 (L) craniotomy for evacuation of hematoma. Patient appears well-nourished; MST negative on admission. Potassium replaced today. Dysphagia [facility] altered diet ordered 1/3 per SLP (oropharyngeal dysphagia) however patient now NPO d/t AMS. Plan discussed during rounds to place NGT for trickle tube feeding x 24 hr. If not sign of aspiration-will advance to goal.    Recommend Osmolite 1.5 formula (non-fiber): 45 ml/hr x 21 hr with 2 packets Prosource daily and 120 ml water flush q 4 hr. This will provide 945 ml, 1540 calories, 89 gm protein and 1560 ml free water (tube feeding/flush) per day. Will hold tube feeding 1 hr before and 2 hr after Synthroid administration. Estimated Nutrition Needs:   Kcals/day: 5045 Kcals/day(25 kcal/kg)  Protein: 74 g(74-87 (1.2-1.4g/kg)  Fluid: (1 ml/kcal)  Based On: Kcal/kg - specify (Comment)  Weight Used: Actual wt(62 kg)    Pt expected to meet estimated nutrient needs:  [x]   Yes via enteral feeding   []  No  [] Unable to predict at this time    Nutrition Diagnosis:   1. Inadequate oral intake related to SDH as evidenced by NPO d/t AMS.     Goals: Tolerate trophic tube feeding; increase to goal in next 1-2 days. Monitoring & Evaluation:    - Enteral/parenteral nutrition intake   - Electrolyte and renal profile, Weight/weight change     Previous Nutrition Goals Met:  N/A  Previous Recommendations:      N/A    Education & Discharge Needs:   [x] None Identified   [] Identified and addressed    [x] Participated in care plan, discharge planning, and/or interdisciplinary rounds        Cultural, Yarsani and ethnic food preferences identified:   None    Skin Integrity: []Intact  [x] surgical incision  Edema: [x]None []Other  Last BM: 1/3  Food Allergies: [x]None []Other    Anthropometrics:    Weight Loss Metrics 1/6/2020 1/2/2020 1/2/2020 1/2/2020 4/28/2018 2/24/2018   Today's Wt 136 lb 11 oz - 145 lb 8.1 oz - 150 lb 155 lb 13.8 oz   BMI - 34.21 kg/m2 - 36.42 kg/m2 37.54 kg/m2 39.01 kg/m2      Last 3 Recorded Weights in this Encounter    01/04/20 0400 01/05/20 0648 01/06/20 1100   Weight: 60.1 kg (132 lb 7.9 oz) 62 kg (136 lb 11 oz) 62 kg (136 lb 11 oz)      Weight Source: Bed  Height: 4' 5\" (134.6 cm),    Body mass index is 34.21 kg/m². Labs:    Lab Results   Component Value Date/Time    Sodium 137 01/06/2020 02:16 AM    Potassium 3.2 (L) 01/06/2020 02:16 AM    Chloride 105 01/06/2020 02:16 AM    CO2 25 01/06/2020 02:16 AM    Glucose 80 01/06/2020 02:16 AM    BUN 7 01/06/2020 02:16 AM    Creatinine 0.49 (L) 01/06/2020 02:16 AM    Calcium 7.4 (L) 01/06/2020 02:16 AM    Magnesium 1.7 01/05/2020 12:21 AM    Phosphorus 2.4 (L) 01/04/2020 03:15 AM    Albumin 2.5 (L) 01/05/2020 12:21 AM     No results found for: HBA1C, HGBE8, NGG8PXJH, KCO4XLGN  Lab Results   Component Value Date/Time    Glucose (POC) 99 01/06/2020 03:07 AM      Lab Results   Component Value Date/Time    ALT (SGPT) 14 01/05/2020 12:21 AM    AST (SGOT) 21 01/05/2020 12:21 AM    Alk.  phosphatase 83 01/05/2020 12:21 AM    Bilirubin, total 0.6 01/05/2020 12:21 AM        Samantha Baca RD CNSC

## 2020-01-06 NOTE — PROGRESS NOTES
Resident Pulmonary Note     2020  PCP: Lincoln Shah NP     Assessment/Plan:     NEURO  1. Left Subdural Hematoma s/p Craniotomy and Evacuation   - Currently stable per Neuro-Surgery   - Continue with q2 hr Neuro checks   - PT/OT/Speech on consult. Appreciate recs   - Neurosurgery following. Appreciate recs    2. Chronic Right Subdural Hematoma   - Stable. No need for acute intervention per neuro-surgery    3. Seizures / Encephalopathy   - Required 7mg of Ativan overnight due to concern for seizure activity   - EEG today showed generalized encephalopathic process    - Findings likely Likely 2/2 acute and chronic Subdural hematomas   - Continue with Keppra and Vimpat for seizure prophylaxis   - Seizure precautions in place   - Plan to minimize ativan use and only administer for Seizure activity >5 mins   - Neuro on consult. Appreciate recs    CARDIOPULMONARY  4. Mild Pulmonary Edema   - CXR this am demonstrates mild pulmonary edema   - Discontinue MIVF and monitor volume status closely   - No plans for IV diuretics at this point   - Strict I&OS    GI   - Pepcid 20mg BID for SUP    ENDO  5. Hypothyroidism   - Convert Home Synthroid 75mcg to 52.65mg IV    FEN  6. Hypokalemia   - K of 3.2 this morning. Replete to goal of 4    7. Tube Feeds   - Initiate trickle feeds at 50cc/hr with Free Water boluses    Subjective:   Pt was seen and examined at bedside. GCS 2.      Objective:   Physical examination  Visit Vitals  /61 (BP 1 Location: Right arm, BP Patient Position: At rest)   Pulse 93   Temp 100.4 °F (38 °C)   Resp 15   Wt 136 lb 11 oz (62 kg)   SpO2 97%   BMI 34.21 kg/m²      Temp (24hrs), Av.9 °F (37.2 °C), Min:96.5 °F (35.8 °C), Max:100.6 °F (38.1 °C)     O2 Flow Rate (L/min): 2 l/min   O2 Device: Nasal cannula      Intake/Output Summary (Last 24 hours) at 2020 0913  Last data filed at 2020 0730  Gross per 24 hour   Intake 3085 ml   Output 2350 ml   Net 735 ml     Last 3 shifts:     1901 - 01/06 0700  In: 4305 [I.V.:4305]  Out: 2385 [Urine:2350; Drains:35]    General:   GCS - 2   Head:   Clean Craniotomy Scar   Eyes:   Conjunctivae clear   ENT:  Oral mucosa normal   Neck:  Supple, trachea midline, no adenopathy   No JVD       Lungs:   Clear to auscultation bilaterally, decreased bibasilar air entry   Heart:   Regular rhythm, no murmur   Abdomen:    Soft, non-tender   No masses or organomegaly    Extremities:  No edema or DVT signs   Skin:  Warm and dry    No rashes or lesions   Neurologic:  GCS-2   Urinary catheter:   Riojas        Data Review:        Recent Labs     01/06/20  0216 01/05/20  0432 01/04/20  0315   WBC 8.2 6.8 6.4   HGB 12.9 14.6 13.3   HCT 40.0 47.0 41.3    192 227     Recent Labs     01/06/20  0216 01/05/20  0021 01/04/20  0315    138 141   K 3.2* 3.6 3.3*    103 106   CO2 25 28 29   GLU 80 83 95   BUN 7 9 12   CREA 0.49* 0.74 0.70   CA 7.4* 8.0* 8.2*   MG  --  1.7 2.1   PHOS  --   --  2.4*   ALB  --  2.5*  --    SGOT  --  21  --    ALT  --  14  --      Medications reviewed  Current Facility-Administered Medications   Medication Dose Route Frequency    levETIRAcetam (KEPPRA) 1,500 mg in 0.9% sodium chloride 100 mL IVPB  1,500 mg IntraVENous Q12H    acetaminophen (TYLENOL) suppository 650 mg  650 mg Rectal Q4H PRN    magnesium sulfate 1 g/100 ml IVPB (premix or compounded)  1 g IntraVENous ONCE    [START ON 1/12/2020] levothyroxine (SYNTHROID) injection 56.25 mcg  56.25 mcg IntraVENous Q24H    dextrose 10 % infusion 125-250 mL  125-250 mL IntraVENous PRN    LORazepam (ATIVAN) injection 2 mg  2 mg IntraVENous Q1H PRN    famotidine (PF) (PEPCID) 20 mg in 0.9% sodium chloride 10 mL injection  20 mg IntraVENous Q12H    miconazole (MICOTIN) 2 % powder   Topical BID    lacosamide (VIMPAT) 100 mg in 0.9% sodium chloride 100 mL IVPB  100 mg IntraVENous Q12H    sodium chloride (NS) flush 5-40 mL  5-40 mL IntraVENous Q8H    sodium chloride (NS) flush 5-40 mL  5-40 mL IntraVENous PRN    0.9% sodium chloride infusion  75 mL/hr IntraVENous CONTINUOUS    acetaminophen (TYLENOL) solution 650 mg  650 mg Oral Q4H PRN    HYDROcodone-acetaminophen (NORCO) 5-325 mg per tablet 1 Tab  1 Tab Oral Q4H PRN    ondansetron (ZOFRAN) injection 4 mg  4 mg IntraVENous Q4H PRN    niCARdipine (CARDENE) 25 mg in 0.9% sodium chloride 250 mL infusion  5-15 mg/hr IntraVENous TITRATE    labetalol (NORMODYNE;TRANDATE) injection 10 mg  10 mg IntraVENous Q15MIN PRN    Or    hydrALAZINE (APRESOLINE) 20 mg/mL injection 10 mg  10 mg IntraVENous Q15MIN PRN    fentaNYL citrate (PF) injection 25-50 mcg  25-50 mcg IntraVENous Q1H PRN    sodium chloride (NS) flush 5-40 mL  5-40 mL IntraVENous Q8H    sodium chloride (NS) flush 5-40 mL  5-40 mL IntraVENous PRN    naloxone (NARCAN) injection 0.4 mg  0.4 mg IntraVENous PRN         Signed:   Nafisa Small MD   Resident, Family Medicine      Attending note: Attending note to follow. ..

## 2020-01-06 NOTE — PROGRESS NOTES
1930  Bedside shift change report given to Marcelle Maldonado RN (oncoming nurse) by Fortino Alex RN (offgoing nurse). Report included the following information SBAR, Kardex, ED Summary, Procedure Summary, Intake/Output, MAR, Recent Results and Cardiac Rhythm NSR. Was told by Fortino Alex RN that EEG tech Evertt Duet would be up to replace EEG leads but did not show. 0045  Attempted to page EEG tech on call, multiple times, but no response.

## 2020-01-06 NOTE — PROGRESS NOTES
Transitions of Care  Home with home health vs short term rehab    Patient remains in the ICU, now with intermittent seizure activity. Keppra bolus was given. PT/OT following. Care management will continue to follow for transitions of care.  Jamel Piña RN

## 2020-01-07 NOTE — PROGRESS NOTES
1930: Bedside and Verbal shift change report given to [address] (oncoming nurse) by Neftali Diaz RN (offgoing nurse). Report included the following information SBAR, Kardex, OR Summary, Procedure Summary, Intake/Output, MAR, Recent Results, Cardiac Rhythm NSR, Alarm Parameters  and Dual Neuro Assessment. 0320: Pt transported to CT via stretcher with RN, PCT, and on monitor. 8845: Pt returned to ICU. Shift summary: Pt rested comfortably in bed entire shift. Chandrika Gonzalez NP at bedside evaluating pt 3-4x this shift. No seizures noted. Otherwise uneventful shift. 0730: Bedside and Verbal shift change report given to Thad De Leon (oncoming nurse) by Sharan Cooper RN (offgoing nurse). Report included the following information SBAR, Kardex, ED Summary, OR Summary, Procedure Summary, Intake/Output, MAR, Recent Results, Cardiac Rhythm NSR, Alarm Parameters  and Dual Neuro Assessment.

## 2020-01-07 NOTE — PROGRESS NOTES
Occupational Therapy [phone]  01/07/2020     Chart reviewed and spoke with RN - patient remains very lethargic and not following commands s/p multiple possible seizures and ativan administration yesterday, therefore currently not appropriate for skilled OT interventions. Will continue to follow. Frederic Melissa MS, OTR/L

## 2020-01-07 NOTE — PROGRESS NOTES
0730: Bedside shift change report given to Kadi Cobb RN (oncoming nurse) by Jonathan Edmondson RN (offgoing nurse). Report included the following information SBAR, Kardex, ED Summary, Procedure Summary, Intake/Output, MAR, Accordion, Recent Results and Dual Neuro Assessment. Primary Nurse Jori Borrego and Yvrose Patterson RN performed a dual skin assessment on this patient No impairment noted  Yosef score is 12    1330: Dr. Haile Awan notified about hypotension. Orders received.

## 2020-01-07 NOTE — PROGRESS NOTES
Neurosurgery Progress Note  [facility]P-BC  [phone]        Admit Date: 2020   LOS: 5 days        Daily Progress Note: 2020    POD:5 Day Post-Op    S/P: Procedure(s):  CRANIOTOMY LEFT FRONTAL FOR SUBDURAL HEMATOMA    HPI: The patient has a history of Down's syndrome. She was brought to the ER by caregivers due to lethargy and confusion. She also has been having balance issues and difficulty standing recently. She was found to have a large chronic SDH on the left with membranes in it. She transferred to [facility] where she was taken to the OR by Dr. Artem Abraham for a craniotomy to evacuate the SDH. Subjective:   No seizures seen on 24 hour EEG yesterday, though some of the leads became dislodged in the middle of the study, so hard to say for sure if there were any epileptiform discharges. No further jerking movements seen in the patient. She is still very lethargic. She does attempt to wake up with stimulation and open her mouth in a grimace when pain applied but does not open eyes. Unable to obtain ROS due to generalized encephalopathy. Objective:     Vital signs  Temp (24hrs), Av.6 °F (37 °C), Min:98.2 °F (36.8 °C), Max:98.9 °F (37.2 °C)   701 -  1900  In: 50 [I.V.:50]  Out: -    190 -  0700  In: 2385 [I.V.:2105]  Out: 2850 [Urine:2850]    Visit Vitals  /60   Pulse 94   Temp 98.9 °F (37.2 °C)   Resp 16   Ht 4' 5\" (1.346 m)   Wt 62 kg (136 lb 11 oz)   SpO2 98%   BMI 34.21 kg/m²    O2 Flow Rate (L/min): 2 l/min O2 Device: Nasal cannula     Pain control  Pain Assessment  Pain Scale 1: Adult Nonverbal Pain Scale    PT/OT  Gait     Gait  Base of Support: Widened  Speed/Sharmila: Shuffled  Gait Abnormalities: Decreased step clearance, Trendelenburg, Trunk sway increased, Shuffling gait  Ambulation - Level of Assistance: Minimal assistance, Assist x2  Distance (ft): 30 Feet (ft)  Assistive Device: Gait belt(bilateral HHA)           Physical Exam:  Gen:NAD.    Neuro: Obtunded. Does not follow commands. Not talking to me. Grimaces to pain but does not open eyes on her own. Pupils pinpoint. Right eye medial deviation. Dysconjugate gaze. Withdraws to pain in all extremities. Skin: Left scalp incision C/D/I with staples in place. CT head without contrast on 01/02/20 shows predominantly hypodense left cerebral convexity subdural fluid collection with hyperdense membranes, most consistent with a chronic subdural hematoma. Mass effect results in 10 mm of rightward midline shift and descending transtentorial herniation with effacement of the suprasellar cistern. No large acute component is identified within the subdural hematoma. Additional small right cerebral convexity hypodense subdural fluid collection measuring up to 8 mm in maximal thickness, most consistent with an additional chronic subdural hematoma. CT head without contrast on 01/03/20 shows interval left frontal craniotomy and drainage of large left multiloculated chronic subdural hematoma. Postoperative findings on the left. Residual right frontal/temporal convexity chronic subdural hematoma without definite interval change. Near complete return of central structures to the midline. CT head without contrast on 01/04/20 shows expected postoperative changes of left subdural hematoma evacuation as above, with no significant change in size of mixed density left subdural hematoma with acute hyperdense components. Stable chronic right subdural hematoma. Stable minimal 2 mm of rightward midline shift. CT head without contrast on 01/07/20 shows slight interval increase in left subdural hematoma status post removal of drainage catheter, with slight increase of midline shift to the right.     24 hour results:    Recent Results (from the past 24 hour(s))   METABOLIC PANEL, BASIC    Collection Time: 01/07/20  4:29 AM   Result Value Ref Range    Sodium 137 136 - 145 mmol/L    Potassium 3.3 (L) 3.5 - 5.1 mmol/L    Chloride 101 97 - 108 mmol/L    CO2 29 21 - 32 mmol/L    Anion gap 7 5 - 15 mmol/L    Glucose 92 65 - 100 mg/dL    BUN 7 6 - 20 MG/DL    Creatinine 0.50 (L) 0.55 - 1.02 MG/DL    BUN/Creatinine ratio 14 12 - 20      GFR est AA >60 >60 ml/min/1.73m2    GFR est non-AA >60 >60 ml/min/1.73m2    Calcium 8.3 (L) 8.5 - 10.1 MG/DL          Assessment:     Principal Problem:    SDH (subdural hematoma) ([facility]) (1/2/2020)    Active Problems:    Down's syndrome (1/2/2020)      Acquired hypothyroidism (1/2/2020)      Seizures ([facility] [address] (1/6/2020)        Plan:   1. Chronic subdural hematoma   - s/p crani 01/02   - neuro checks every 2 hours   - PT/OT/Speech as able   - Cont Keppra   - Repeat head CT Friday am  2. Brain compression   - due to #1   - plans as above  3. Down's syndrome   - Sitter PRN   -  Supportive care  4. Hypothyroidism    - cont levothyroxine from home  5. Seizures   - Neurology consulted   - Pt on Keppra 1500 mg bid and Vimpat 100 mg bid   - Ativan PRN  6. Acute metabolic encephalopathy   - due to #5 and likely due to medications given for seizures   - plans as above    Activity: up with assist  DVT ppx: SCDs  Dispo: tbd    Plan d/w Dr. Surinder Moscoso. No further surgical intervention at this time. Suspect patient just needs some time to clear the Ativan from her system. Would continue to monitor in ICU to make sure she does not have further seizures. Await further recs from neurology. Will repeat head CT on Friday am. The slight change in the SDH this am would not make her continue to be obtunded. I think she is likely a slow metabolizer of medications and she received 15 mg of IV Ativan in 48 hours as well as a total of 5g of Keppra yesterday and 1500 mg of Keppra so far. Would continue to observe and give her time to flush out the medications.       Hamlet Sorensen NP

## 2020-01-07 NOTE — PROGRESS NOTES
SLP Contact Note    SLP following for dysphagia treatment. Pt remains very lethargic and not very responsive. Therefore, pt not appropriate for SLP intervention at this time. Will continue to follow.       Thank you,  ZACK ZavalaEd, [id number] [facility]  Speech-Language Pathologist

## 2020-01-07 NOTE — PROGRESS NOTES
Physical Therapy Note  01/07/2020    Chart reviewed and spoke with RN - pt remains very lethargic and not following commands s/p multiple possible seizures and ativan administration yesterday, therefore currently not appropriate for skilled PT interventions. Will continue to follow.     Analia Rodriguezs, PT, DPT

## 2020-01-07 NOTE — PROGRESS NOTES
Neurology Progress Note  BECCA Roland  Neurocritical Care/NIS/Neurology NP - [facility] Cell: [phone]      Date of admission: 1/2/2020    Patient: Kimberly Santana MRN: [id number]  SSN: [id number]    YOB: 1955  Age: 71 y.o. Sex: female      Chief complaint: SDH complicated by seizures    Subjective:     HPI: Kimberly Santana is a 71 y.o. female with Down's syndrome, chronic SDH admitted with encephalopathy, now s/p evacuation of L SDH 1/3/20. Neurology consulted due to reported focal (R facial twitching) as well as questionable generalized seizure activity b/l UE and face noted on 1/4. CT head 1/4 with L SDH evacuation, postoperative findings, L SDH largely unchanged, stable chronic R SDH, unchanged 2mm shift. EEG 1/4 with moderate to severe encephalopathy, intermittent sharply contoured waves without electrographic seizure activity. 24h EEG, completed 1/6 a.m., was very limited due to dislodgement of several electrodes. The background continues to indicate generalized encephalopathic process. The patient was noted to be restless in the early hours of the morning around 01:30 a.m., but readable portions do not convincingly show seizure activity. Prior to Admission medications    Medication Sig Start Date End Date Taking? Authorizing Provider   calcium-cholecalciferol, d3, (CALCIUM 600 + D) 600-125 mg-unit tab Take  by mouth. Coleman Fournier MD   QUEtiapine (SEROQUEL) 25 mg tablet Take 25 mg by mouth two (2) times a day. Coleman Fournier MD   trazodone HCl (TRAZODONE PO) Take  by mouth. Coleman Fournier MD   loratadine (CLARITIN) 10 mg tablet Take 1 Tab by mouth daily. 4/28/18   Caridad Kumar MD   aspirin 81 mg chewable tablet Take 81 mg by mouth daily. Coleman Fournier MD   levothyroxine (SYNTHROID) 75 mcg tablet Take 75 mcg by mouth Daily (before breakfast).     Coleman Fournier MD     Medication Dose Route Frequency    dextrose 10 % infusion 125-250 mL  125-250 mL IntraVENous PRN    levETIRAcetam (KEPPRA) 1,000 mg in 0.9% sodium chloride 100 mL IVPB  1,000 mg IntraVENous Q12H    LORazepam (ATIVAN) injection 2 mg  2 mg IntraVENous Q1H PRN    famotidine (PF) (PEPCID) 20 mg in 0.9% sodium chloride 10 mL injection  20 mg IntraVENous Q12H    miconazole (MICOTIN) 2 % powder   Topical BID    lacosamide (VIMPAT) 100 mg in 0.9% sodium chloride 100 mL IVPB  100 mg IntraVENous Q12H    levothyroxine (SYNTHROID) tablet 75 mcg  75 mcg Oral ACB    loratadine (CLARITIN) tablet 10 mg  10 mg Oral DAILY    traZODone (DESYREL) tablet 50 mg  50 mg Oral QPM    sodium chloride (NS) flush 5-40 mL  5-40 mL IntraVENous Q8H    sodium chloride (NS) flush 5-40 mL  5-40 mL IntraVENous PRN    0.9% sodium chloride infusion  75 mL/hr IntraVENous CONTINUOUS    acetaminophen (TYLENOL) solution 650 mg  650 mg Oral Q4H PRN    HYDROcodone-acetaminophen (NORCO) 5-325 mg per tablet 1 Tab  1 Tab Oral Q4H PRN    ondansetron (ZOFRAN) injection 4 mg  4 mg IntraVENous Q4H PRN    niCARdipine (CARDENE) 25 mg in 0.9% sodium chloride 250 mL infusion  5-15 mg/hr IntraVENous TITRATE    labetalol (NORMODYNE;TRANDATE) injection 10 mg  10 mg IntraVENous Q15MIN PRN    hydrALAZINE (APRESOLINE) 20 mg/mL injection 10 mg  10 mg IntraVENous Q15MIN PRN    docusate sodium (COLACE) capsule 100 mg  100 mg Oral BID    fentaNYL citrate (PF) injection 25-50 mcg  25-50 mcg IntraVENous Q1H PRN    QUEtiapine (SEROquel) tablet 25 mg  25 mg Oral QHS    sodium chloride (NS) flush 5-40 mL  5-40 mL IntraVENous Q8H    sodium chloride (NS) flush 5-40 mL  5-40 mL IntraVENous PRN    naloxone (NARCAN) injection 0.4 mg  0.4 mg IntraVENous PRN        No Known Allergies    Review of systems  Unable due to mental status    Objective:     Vitals:    20 2200 20 2206 20 2300 20 0000   BP: (!) 80/44 129/67 108/51 123/60   Pulse: 70  80 72   Resp: 11  13 12   Temp:    98.9 °F (37.2 °C)   SpO2: 98%  93% 100%        Temp (24hrs), Av °F (37.2 °C), Min:98.2 °F (36.8 °C), Max:100.4 °F (38 °C)        O2 Device: Nasal cannula  O2 Flow Rate (L/min): 2 l/min      Intake/Output Summary (Last 24 hours) at 1/7/2020 0248  Last data filed at 1/7/2020 0000  Gross per 24 hour   Intake 1530 ml   Output 1950 ml   Net -420 ml       General: In NAD. Cardiac: NSR. RRR, no significant R/G/M. Lungs: CTA, diminished bases, unlabored breathing. Abdomen: Soft/NT/non-distended, BS present. : Wicking device. Otherwise deferred  Extremities. No edema. Palpable pulses. Skin: Good Turgor, warm dry skin, no significant rashes, ecchymoses, wounds or ulcers. Neurologic Exam:  Does not open eyes. Non-verbal and not FCs. Does not regard when eyes opened. Pupils equal, round and reactive to light. R eye esotropic at baseline. L eye midline gaze. Bulk and tone normal. No involuntary movements. Spontaneous movements in BLEs. Weakly withdraws to noxious  stim in all 4s. LABS:  Recent Labs     01/06/20  0216 01/05/20  0432 01/04/20  0315   WBC 8.2 6.8 6.4   HGB 12.9 14.6 13.3   HCT 40.0 47.0 41.3    192 227     Recent Labs     01/06/20  0216 01/05/20  0021 01/04/20  0315    138 141   K 3.2* 3.6 3.3*    103 106   CO2 25 28 29   BUN 7 9 12   CREA 0.49* 0.74 0.70   GLU 80 83 95   CA 7.4* 8.0* 8.2*   MG  --  1.7 2.1   PHOS  --   --  2.4*     Recent Labs     01/05/20  0021   SGOT 21   ALT 14   AP 83   TBILI 0.6   TP 6.4   ALB 2.5*   GLOB 3.9     No results for input(s): INR, PTP, APTT, INREXT, INREXT in the last 72 hours. No results for input(s): PHI, PCO2I, PO2I, HCO3I in the last 72 hours. No results for input(s): CPK, CKNDX, TROIQ in the last 72 hours.     No lab exists for component: CPKMB  Lab Results   Component Value Date/Time    Cholesterol, total 200 (H) 11/02/2009 11:48 AM    HDL Cholesterol 65 (H) 11/02/2009 11:48 AM    LDL, calculated 108 (H) 11/02/2009 11:48 AM    Triglyceride 135 11/02/2009 11:48 AM    CHOL/HDL Ratio 3.1 11/02/2009 11:48 AM     Lab Results   Component Value Date/Time    Glucose (POC) 99 01/06/2020 03:07 AM    Glucose (POC) 69 01/06/2020 02:20 AM    Glucose (POC) 132 (H) 01/05/2020 07:23 AM    Glucose (POC) 69 01/05/2020 06:53 AM     Lab Results   Component Value Date/Time    Color YELLOW/STRAW 01/02/2020 02:31 PM    Appearance CLEAR 01/02/2020 02:31 PM    Specific gravity 1.020 01/02/2020 02:31 PM    pH (UA) 7.0 01/02/2020 02:31 PM    Protein NEGATIVE  01/02/2020 02:31 PM    Glucose NEGATIVE  01/02/2020 02:31 PM    Ketone NEGATIVE  01/02/2020 02:31 PM    Bilirubin NEGATIVE  01/02/2020 02:31 PM    Urobilinogen 1.0 01/02/2020 02:31 PM    Nitrites NEGATIVE  01/02/2020 02:31 PM    Leukocyte Esterase NEGATIVE  01/02/2020 02:31 PM    Epithelial cells FEW 01/02/2020 02:31 PM    Bacteria NEGATIVE  01/02/2020 02:31 PM    WBC 0-4 01/02/2020 02:31 PM    RBC 0-5 01/02/2020 02:31 PM         Imaging:  Xr Abd (kub)    Result Date: 1/6/2020  IMPRESSION: Gastric tube tip is in appropriate position for use. Xr Chest Port    Result Date: 1/6/2020  IMPRESSION: Mild diffuse interstitial opacities with a basilar predominance suggesting pulmonary edema. Moderate cardiac silhouette enlargement. CT Results:  Results from Hospital Encounter encounter on 01/02/20   CT HEAD WO CONT    Narrative EXAM:  CT HEAD WO CONT    INDICATION:   post op craniotomy    COMPARISON: CT head 1/30/2020. TECHNIQUE: Unenhanced CT of the head was performed using 5 mm images. Brain and  bone windows were generated. CT dose reduction was achieved through use of a  standardized protocol tailored for this examination and automatic exposure  control for dose modulation. FINDINGS:  Stable postsurgical changes of left frontal craniotomy and chronic subdural  hematoma evacuation, with a left-sided subdural drain in place.  No significant  change in size of mixed density left cerebral convexity subdural hematoma with  acute hyperdense components, measuring up to 11 mm in maximal thickness. Scattered left cerebral pneumocephalus is unchanged. There is unchanged minimal  2 mm of rightward midline shift. There is an unchanged chronic right subdural  hematoma measuring up to 11 mm in maximal thickness. No acute hyperdense  component is identified within this subdural hematoma. The ventricles are normal in size and position. Basilar cisterns are patent. No  evidence of acute infarct. The paranasal sinuses, mastoid air cells, and middle  ears are clear. Unchanged medially deviated right globe. Impression IMPRESSION:  1. Expected postoperative changes of left subdural hematoma evacuation as above,  with no significant change in size of mixed density left subdural hematoma with  acute hyperdense components. Stable chronic right subdural hematoma. Stable  minimal 2 mm of rightward midline shift. CT HEAD WO CONT    Narrative EXAM:  CT HEAD WO CONT  INDICATION:  assess subdural hematoma post drainage. TECHNIQUE:   Thin axial images were obtained through the calvarium and saved with standard  and bone window algorithm. Coronal and sagittal reconstructions were generated. CT dose reduction was achieved through use of a standardized protocol tailored  for this examination and automatic exposure control for dose modulation. COMPARISON: CT head yesterday. Previous CT head 11/20/18  FINDINGS:  Since the prior CT patient is undergone a left frontal craniotomy for treatment  of large chronic primarily left-sided multiloculated subdural hematoma which had  significant mass effect, developing herniation as well as developing right-sided  hydrocephalus. Postoperative findings are demonstrated with subdural drain in place. Residual  extra-axial collection on the left has higher density consistent with more acute  blood was some gas/air. Some root persistent effacement of left convexity sulci.   This represents a marked interval improvement from the preoperative exam and  there has been near complete return of central structures to the midline. There is a chronic right subdural hematoma with some effacement of the sulci. This appears somewhat more prominent on today's exam, however it is likely in  part related to interval decompression of the brain and expansion causing  increased constant acuity of the right sided subdural hematoma over the frontal  and temporal convexity. Small amount of high density blood in the dependent  posterior right subdural similar to the preoperative exam with no interval new  hemorrhage demonstrated. Otherwise, cerebral density is within normal limits with no evidence of acute  brain infarction. Impression IMPRESSION:  1. Interval left frontal craniotomy and drainage of large left multiloculated  chronic subdural hematoma. 2. Postoperative findings on the left as described above. 3. Residual right frontal/temporal convexity chronic subdural hematoma without  definite interval change. 4. Near complete return of central structures to the midline. Results from [place] encounter on 01/02/20   CT HEAD WO CONT    Narrative EXAM:  CT HEAD WO CONT    INDICATION:   ams    COMPARISON: CT head 1/2/2020. TECHNIQUE: Unenhanced CT of the head was performed using 5 mm images. Brain and  bone windows were generated. CT dose reduction was achieved through use of a  standardized protocol tailored for this examination and automatic exposure  control for dose modulation. FINDINGS:  Predominantly hypodense to the left cerebral convexity subdural fluid collection  measuring up to 2.0 cm in maximal thickness. There are hyperdense septations  noted within the fluid collection, indicating a chronic component. No other  obvious hyperdense components are identified. There is mass effect on the  adjacent brain parenchyma, and resultant 10 mm of rightward midline shift.  There  is also descending transtentorial herniation with effacement of the suprasellar  cistern. There is a small right cerebral convexity hypodense subdural fluid collection  which measures up to 8 mm in maximal thickness along the frontal convexity. There is mild local mass effect on the adjacent brain parenchyma. There is no acute infarct. The paranasal sinuses, mastoid air cells, and middle  ears are clear. The orbital contents are within normal limits. Disconjugate gaze  with medial deviation of the right globe. There are no significant osseous or  extracranial soft tissue lesions. Impression IMPRESSION:  1. Predominantly hypodense left cerebral convexity subdural fluid collection  with hyperdense membranes, most consistent with a chronic subdural hematoma. Mass effect results in 10 mm of rightward midline shift and descending  transtentorial herniation with effacement of the suprasellar cistern. No large  acute component is identified within the subdural hematoma. 2. Additional small right cerebral convexity hypodense subdural fluid collection  measuring up to 8 mm in maximal thickness, most consistent with an additional  chronic subdural hematoma. The findings were called to Doreen Lomas on 1/2/2020 at 4:11 PM by Dr. North Morales. [phone]         MRI Results:  No results found for this or any previous visit.       Hospital Problems  Date Reviewed: 1/2/2020          Codes Class Noted POA    Seizures ([facility] ICD-10-CM: R56.9  ICD-9-CM: 780.39  1/6/2020 Unknown        * (Principal) SDH (subdural hematoma) ([facility] ICD-10-CM: Y91.7P0C  ICD-9-CM: 432.1  1/2/2020 Yes        Down's syndrome (Chronic) ICD-10-CM: Q90.9  ICD-9-CM: 758.0  1/2/2020 Yes        Acquired hypothyroidism (Chronic) ICD-10-CM: E03.9  ICD-9-CM: 244.9  1/2/2020 Yes              Assessment/Plan:     The seizure like activity has been unusual in that it has appeared focal in nature but has occured bilaterally in separate areas including R face (that I have not witnessed), but also in the R LE, L LE and L LE and L shoulder together, all of which I have seen, and have been rhythmic in nature. Overnight 1/5 to 1/6, the patient had several of the rhythmic jerking episodes described above, (except for the facial twitching), for which she received ativan with cessation of abnormal movements. Additionally a bolus dose of Keppra was given and the dose increased to 1.5 g bid. Vimpat continues at 100 mg bid. Since the bolus of Keppra, (at about 2 am 1/6) there have not been any further abnormal movements. Family reports improvement in neurologic status. Still quite obtunded on my exam above. Hopefully, if she's been having seizures and they've now stopped, she will start to wake up. Continue current AEDs for now    Final Recs to follow from Dr. Migue Lackey    Thank you for this consult and for the opportunity to participate in the care of this patient.     Signed By: Kvng Singh NP     January 7, 2020 1:05 AM

## 2020-01-08 NOTE — PROGRESS NOTES
Problem: Mobility Impaired (Adult and Pediatric)  Goal: *Acute Goals and Plan of Care (Insert Text)  Description  FUNCTIONAL STATUS PRIOR TO ADMISSION: Patient was independent for gait without use of DME.    HOME SUPPORT PRIOR TO ADMISSION: The patient lived with her son and daughter in-law. Physical Therapy Goals  Initiated 1/3/2020  1. Patient will move from supine to sit and sit to supine  in bed with modified independence within 7 day(s). 2.  Patient will transfer from bed to chair and chair to bed with modified independence using the least restrictive device within 7 day(s). 3.  Patient will perform sit to stand with modified independence within 7 day(s). 4.  Patient will ambulate with modified independence for 100 feet with the least restrictive device within 7 day(s). 5.  Patient will ascend/descend 1 step with no handrail(s) with modified independence within 7 day(s). Outcome: Not Progressing Towards Goal    PHYSICAL THERAPY TREATMENT  Patient: Arya Petit (51 y.o. female)  Date: 1/8/2020  Diagnosis: SDH (subdural hematoma) ([facility]) [S06.5X9A]   SDH (subdural hematoma) ([facility])  Procedure(s) (LRB):  CRANIOTOMY LEFT FRONTAL FORSUBDURAL HEMATOMA (Left) 6 Days Post-Op  Precautions:    Chart, physical therapy assessment, plan of care and goals were reviewed. ASSESSMENT  Patient continues with skilled PT services and is not progressing towards goals following possible seizure activity on 1/6. Max multi modal attempts made in attempt to improve overall alertness - however unable to elicit any purposeful movement, sustained eye opening, or verbalizations. Did  withdraw x4 extremities with deep noxious stimuli. Bed transitioned to modified chair position - dependently assisted trunk fwd off bed in hopes of vestibular input improving alertness - brief eye opening only.  Performed PROM B LEs to reduce risk of contracture formation and repositioned with HOB elevated to promote improved cardiopulmonary toileting. Per neuro notes, hopeful pt will improve as she clears meds from her system - will continue to follow for mobility progression as able. Current Level of Function Impacting Discharge (mobility/balance): total A; not following any commands     Other factors to consider for discharge: intellectual disability at baseline; brother was providing care for her prior to her SNF respite stay while he recovered from back surgery          PLAN :  Patient continues to benefit from skilled intervention to address the above impairments. Continue treatment per established plan of care. to address goals. Recommendation for discharge: (in order for the patient to meet his/her long term goals)  To be determined: pending progress     This discharge recommendation:  Has been made in collaboration with the attending provider and/or case management    IF patient discharges home will need the following DME: to be determined (TBD)       SUBJECTIVE:   No verbalizations or attempts to interact    OBJECTIVE DATA SUMMARY:   Critical Behavior:  Neurologic State: Eyes open to stimulus  Orientation Level: Unable to verbalize  Cognition: No command following  Safety/Judgement: Decreased awareness of environment, Decreased awareness of need for assistance, Decreased awareness of need for safety, Decreased insight into deficits, Fall prevention  Functional Mobility Training:    Balance:  Sitting: Impaired; With support  Sitting - Static: Poor (constant support)  Sitting - Dynamic: Poor (constant support)    Activity Tolerance:   Poor  Please refer to the flowsheet for vital signs taken during this treatment.     After treatment patient left in no apparent distress:   Supine in bed, Heels elevated for pressure relief, Call bell within reach, Bed / chair alarm activated, Side rails x 3, and Restraints    COMMUNICATION/COLLABORATION:   The patients plan of care was discussed with: Occupational Therapist and Registered Nurse    Shiv Hall Leala Factor, PT, DPT   Time Calculation: 16 mins

## 2020-01-08 NOTE — PROGRESS NOTES
Neurosurgery Progress Note  [place], [facility]  [phone]        Admit Date: 2020   LOS: 6 days        Daily Progress Note: 2020    POD: 6 Day Post-Op    S/P: Procedure(s):  CRANIOTOMY LEFT FRONTAL FOR SUBDURAL HEMATOMA    HPI: The patient has a history of Down's syndrome. She was brought to the ER by caregivers due to lethargy and confusion. She also has been having balance issues and difficulty standing recently. She was found to have a large chronic SDH on the left with membranes in it. She transferred to [facility] where she was taken to the OR by Dr. Surinder Moscoso for a craniotomy to evacuate the SDH. Subjective: The patient is starting to wake up more and move around the bed. She is back in restraints due to attempting to pull out her NG tube. She will open her eyes to voice. I had a long discussion with the patient's daughter-in-law at the bedside and later her brother joined us. They stated the patient has had multiple falls over the past few months, including two falls where she face planted. They state that she had jerking movements at home at baseline and sometimes she would have funny movements with her mouth at baseline. They said she also had behavioral issues and would would throw tantrums and just fall down at times. The patient's head CT was improved this morning. Unable to obtain ROS due to generalized encephalopathy. Objective:     Vital signs  Temp (24hrs), Av.2 °F (36.8 °C), Min:97.8 °F (36.6 °C), Max:99.1 °F (37.3 °C)   No intake/output data recorded.    1901 -  0700  In: 2960 [I.V.:1280]  Out: 1476 [Urine:1475]    Visit Vitals  /55 (BP 1 Location: Left arm, BP Patient Position: At rest)   Pulse 82   Temp 98 °F (36.7 °C)   Resp 21   Ht 4' 5\" (1.346 m)   Wt 62 kg (136 lb 11 oz)   SpO2 98%   BMI 34.21 kg/m²    O2 Flow Rate (L/min): 2 l/min O2 Device: Nasal cannula     Pain control  Pain Assessment  Pain Scale 1: Adult Nonverbal Pain Scale    PT/OT  Gait Gait  Base of Support: Widened  Speed/Sharmila: Shuffled  Gait Abnormalities: Decreased step clearance, Trendelenburg, Trunk sway increased, Shuffling gait  Ambulation - Level of Assistance: Minimal assistance, Assist x2  Distance (ft): 30 Feet (ft)  Assistive Device: Gait belt(bilateral HHA)           Physical Exam:  Gen:NAD. Neuro: Lethargic. Opens eyes to voice and looks at me. Does not follow commands. Not talking to me. LOVE spontaneously. Pupils pinpoint. Right eye medial deviation. Dysconjugate gaze. Withdraws to pain in all extremities. Skin: Left scalp incision C/D/I with staples in place. CT head without contrast on 01/02/20 shows predominantly hypodense left cerebral convexity subdural fluid collection with hyperdense membranes, most consistent with a chronic subdural hematoma. Mass effect results in 10 mm of rightward midline shift and descending transtentorial herniation with effacement of the suprasellar cistern. No large acute component is identified within the subdural hematoma. Additional small right cerebral convexity hypodense subdural fluid collection measuring up to 8 mm in maximal thickness, most consistent with an additional chronic subdural hematoma. CT head without contrast on 01/03/20 shows interval left frontal craniotomy and drainage of large left multiloculated chronic subdural hematoma. Postoperative findings on the left. Residual right frontal/temporal convexity chronic subdural hematoma without definite interval change. Near complete return of central structures to the midline. CT head without contrast on 01/04/20 shows expected postoperative changes of left subdural hematoma evacuation as above, with no significant change in size of mixed density left subdural hematoma with acute hyperdense components. Stable chronic right subdural hematoma. Stable minimal 2 mm of rightward midline shift.     CT head without contrast on 01/07/20 shows slight interval increase in left subdural hematoma status post removal of drainage catheter, with slight increase of midline shift to the right. 24 hour results:    Recent Results (from the past 24 hour(s))   METABOLIC PANEL, BASIC    Collection Time: 01/08/20  3:51 AM   Result Value Ref Range    Sodium 141 136 - 145 mmol/L    Potassium 3.4 (L) 3.5 - 5.1 mmol/L    Chloride 104 97 - 108 mmol/L    CO2 33 (H) 21 - 32 mmol/L    Anion gap 4 (L) 5 - 15 mmol/L    Glucose 140 (H) 65 - 100 mg/dL    BUN 8 6 - 20 MG/DL    Creatinine 0.59 0.55 - 1.02 MG/DL    BUN/Creatinine ratio 14 12 - 20      GFR est AA >60 >60 ml/min/1.73m2    GFR est non-AA >60 >60 ml/min/1.73m2    Calcium 8.1 (L) 8.5 - 10.1 MG/DL   CBC W/O DIFF    Collection Time: 01/08/20  3:51 AM   Result Value Ref Range    WBC 6.9 3.6 - 11.0 K/uL    RBC 4.16 3.80 - 5.20 M/uL    HGB 13.2 11.5 - 16.0 g/dL    HCT 39.3 35.0 - 47.0 %    MCV 94.5 80.0 - 99.0 FL    MCH 31.7 26.0 - 34.0 PG    MCHC 33.6 30.0 - 36.5 g/dL    RDW 12.7 11.5 - 14.5 %    PLATELET 391 412 - 271 K/uL    MPV 10.6 8.9 - 12.9 FL    NRBC 0.0 0  WBC    ABSOLUTE NRBC 0.00 0.00 - 0.01 K/uL   MAGNESIUM    Collection Time: 01/08/20  3:51 AM   Result Value Ref Range    Magnesium 2.0 1.6 - 2.4 mg/dL   PHOSPHORUS    Collection Time: 01/08/20  3:51 AM   Result Value Ref Range    Phosphorus 1.9 (L) 2.6 - 4.7 MG/DL          Assessment:     Principal Problem:    SDH (subdural hematoma) ([facility]) (1/2/2020)    Active Problems:    Down's syndrome (1/2/2020)      Acquired hypothyroidism (1/2/2020)      Seizures ([facility] [place] [address] (1/6/2020)        Plan:   1. Chronic subdural hematoma   - s/p crani 01/02   - neuro checks every 2 hours   - PT/OT/Speech as able   - Cont Keppra. Will check a keppra level  2. Brain compression   - due to #1   - plans as above  3. Down's syndrome   - Sitter PRN   - Supportive care  4. Hypothyroidism    - cont levothyroxine from home  5.  Seizures   - Neurology following   - Pt on Keppra 1500 mg bid and Vimpat 100 mg bid   - Ativan PRN  6. Acute metabolic encephalopathy   - due to #5 and likely due to medications given for seizures   - plans as above    Activity: up with assist  DVT ppx: SCDs  Dispo: tbd    Plan d/w Dr. Obdulio Lomas. No further surgical intervention at this time. Suspect patient just needs some time to clear the Ativan from her system. Would continue to monitor in ICU to make sure she does not have further seizures. The slight change in the SDH this am would not make her continue to be obtunded. I think she is likely a slow metabolizer of medications and she received 15 mg of IV Ativan in 48 hours as well as a total of 5g of Keppra on Monday and 3 g of Keppra yesterday. Would continue to observe and give her time to flush out the medications. Family states patient was in a SNF for a week of respite before and that they would not want her to go back to a SNF. She was not well-taken care of there and they desire her to be cared for at home. Explained to family that if she ended up getting to the point where she felt like she needed another surgery, we would have a discussion with the family about risks, benefits, and alternatives of possible comfort care. We are not at that point yet.        Chad Roldan NP

## 2020-01-08 NOTE — PROGRESS NOTES
Bedside and Verbal shift change report given to Karime Washington  (oncoming nurse) by [address] [place] [address] (offgoing nurse). Report included the following information SBAR, Recent Results and Cardiac Rhythm NSR. Bedside and Verbal shift change report given to [address] [place] [address] (oncoming nurse) by Karime Washington (offgoing nurse). Report included the following information SBAR, Intake/Output, Cardiac Rhythm NSR and Alarm Parameters .

## 2020-01-08 NOTE — PROGRESS NOTES
[facility]    ICU TEAM Progress Note    Name: Brenton Aguilar   : 1955   MRN: [id number]   Date: 2020      Subjective:   Progress Note: 2020    CC: Altered mental status  Reason for ICU Admission:   Chronic SDH  Craniotomy for drain SHD  Down Syndrome  Anxiety    Overnight Events: improved mentation, purposeful movement with all ext, no seizure like activity noted overnight. S/P: Procedure(s):  CRANIOTOMY LEFT FRONTAL FORSUBDURAL HEMATOMA    Active Problem List:     Problem List  Date Reviewed: 2020          Codes Class    Seizures ([facility] [address] ICD-10-CM: R56.9  ICD-9-CM: 780.39         * (Principal) SDH (subdural hematoma) ([facility] [address] ICD-10-CM: Z52.5U8J  ICD-9-CM: 432.1         Down's syndrome (Chronic) ICD-10-CM: Q90.9  ICD-9-CM: 758.0         Acquired hypothyroidism (Chronic) ICD-10-CM: E03.9  ICD-9-CM: 244.9               Past Medical History:      has a past medical history of Endocrine disease and Psychiatric disorder. Past Surgical History:      has a past surgical history that includes hx cholecystectomy.     Medications:     Current Facility-Administered Medications   Medication Dose Route Frequency    potassium, sodium phosphates (NEUTRA-PHOS) packet 2 Packet  2 Packet Oral BID    famotidine (PEPCID) 40 mg/5 mL (8 mg/mL) oral suspension 20 mg  20 mg Per NG tube BID    lacosamide (VIMPAT) tablet 100 mg  100 mg Per NG tube Q12H    levETIRAcetam (KEPPRA) oral solution 1,500 mg  1,500 mg Per NG tube Q12H    levothyroxine (SYNTHROID) tablet 75 mcg  75 mcg Per NG tube DAILY    acetaminophen (TYLENOL) suppository 650 mg  650 mg Rectal Q4H PRN    dextrose 10 % infusion 125-250 mL  125-250 mL IntraVENous PRN    miconazole (MICOTIN) 2 % powder   Topical BID    sodium chloride (NS) flush 5-40 mL  5-40 mL IntraVENous Q8H    sodium chloride (NS) flush 5-40 mL  5-40 mL IntraVENous PRN    acetaminophen (TYLENOL) solution 650 mg  650 mg Oral Q4H PRN    HYDROcodone-acetaminophen (NORCO) 5-325 mg per tablet 1 Tab  1 Tab Oral Q4H PRN    ondansetron (ZOFRAN) injection 4 mg  4 mg IntraVENous Q4H PRN    fentaNYL citrate (PF) injection 25-50 mcg  25-50 mcg IntraVENous Q1H PRN    sodium chloride (NS) flush 5-40 mL  5-40 mL IntraVENous Q8H    sodium chloride (NS) flush 5-40 mL  5-40 mL IntraVENous PRN    naloxone (NARCAN) injection 0.4 mg  0.4 mg IntraVENous PRN       Allergies/Social/Family History:     No Known Allergies   Social History     Tobacco Use    Smoking status: Never Smoker    Smokeless tobacco: Never Used   Substance Use Topics    Alcohol use: No      History reviewed. No pertinent family history. Review of Systems:     Review of systems not obtained due to patient factors.     Objective:   Vital Signs:  Visit Vitals  BP 91/51 (BP 1 Location: Left arm, BP Patient Position: At rest)   Pulse 68   Temp 98.5 °F (36.9 °C)   Resp 11   Ht 4' 5\" (1.346 m)   Wt 62 kg (136 lb 11 oz)   SpO2 100%   BMI 34.21 kg/m²    O2 Flow Rate (L/min): 2 l/min O2 Device: Nasal cannula Temp (24hrs), Av.3 °F (36.8 °C), Min:97.8 °F (36.6 °C), Max:99.1 °F (37.3 °C)           Intake/Output:     Intake/Output Summary (Last 24 hours) at 2020 1356  Last data filed at 2020 1300  Gross per 24 hour   Intake 2600 ml   Output 1076 ml   Net 1524 ml       Physical Exam:    General:  female, Down Syndrome features  Eye:  conjunctivae/corneas clear  Neurologic:  Somnolent, does not open eyes, does not follow commands  Neck:  normal and neck supple and symmetrical.     Lungs:  clear to auscultation bilaterally, diminished breath sounds R base, L base  Heart:  RR, S1, S2 normal  Abdomen:  soft, non-tender  Skin:  Warm, dry     LABS AND  DATA: Personally reviewed  Recent Labs     20  0351 20  0216   WBC 6.9 8.2   HGB 13.2 12.9   HCT 39.3 40.0    220     Recent Labs     20  0351 20  0429    137   K 3.4* 3.3*    101   CO2 33* 29   BUN 8 7   CREA 0.59 0.50*   * 92   CA 8.1* 8.3*   MG 2.0 2.1   PHOS 1.9* 1.8*     No results for input(s): SGOT, GPT, AP, TBIL, TP, ALB, GLOB, AML, LPSE in the last 72 hours. No lab exists for component: AMYP  No results for input(s): INR, PTP, APTT, INREXT, INREXT in the last 72 hours. No results for input(s): PHI, PCO2I, PO2I, FIO2I in the last 72 hours. No results for input(s): CPK, CKMB, TROIQ, BNPP in the last 72 hours. Hemodynamics:   PAP:   CO:     Wedge:   CI:     CVP:    SVR:       PVR:       Ventilator Settings:  Mode Rate Tidal Volume Pressure FiO2 PEEP                    Peak airway pressure:      Minute ventilation:          MEDS: Reviewed    CT head: personally reviewed and report checked      Assessment:     ICU Problems:  - Chronic SDH  - Tonic clonic Seizure  - Craniotomy for SDH decompression  - Down Syndrome  - Anxiety  - Acute encephalopathy     ICU Comprehensive Plan of Care:   Plans for this Shift:   1. Continue q2h Neuro checks, continue to have SDH of mixed chronicity   2. Monitor neuro status, neurology consulted for seizure activity, continue Q2H neuro check per Neurology  3. 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 Electrolyte replacement   5. PT/OT/Speech tx  6. Hold all anxiolytics  7. Continue antiepileptics, if additional seizure activity noted, will increase antiepileptic dosing  8. RT for pulmonary toileting, ante suction, CPT, aspiration precautions     Multidisciplinary Rounds Completed: Yes    ABCDEF Bundle/Checklist  Pain Medications: Acetaminophen  Target RASS: N/A  Sedation Medications: None  CAM-ICU:  unable to assess  Mobility: Poor  PT/OT: PT consulted and on board, OT consulted and on board and Speech therapy consulted and on board   Restraints: Soft wrist restraints  Discussed Plan of Care (goals of care):  Yes  Addressed Code Status: Full Code    CARDIOVASCULAR  Cardiac Gtts: None  SBP Goal of: < 140 mmHg  MAP Goal of: < 90 mmHg  Transfusion Trigger (Hgb): <7 g/dL    RESPIRATORY  Vent Goals:   Aggressive bronchopulmonary hygiene  DVT Prophylaxis (if no, list reason): SCD's or Sequential Compression Device   SPO2 Goal: > 92%  Pulmonary toilet: as best as possible     GI/  [place] Catheter Present: No  GI Prophylaxis: Pepcid (famotidine)   Nutrition: NGT placement    IVFs: discontinued  Bowel Movement: Yes  Bowel Regimen: MiraLax  Insulin:  Sliding scale PRN    ANTIBIOTICS  Antibiotics:  none    T/L/D  Tubes: Nasogastric Tube  Lines: Peripheral IV  Drains: None    SPECIAL EQUIPMENT  None    DISPOSITION  Stay in ICU    CRITICAL CARE CONSULTANT NOTE  I had a face to face encounter with the patient, reviewed and interpreted patient data including clinical events, labs, images, vital signs, I/O's, and examined patient. I have discussed the case and the plan and management of the patient's care with the consulting services, the bedside nurses and the respiratory therapist.    NOTE OF PERSONAL INVOLVEMENT IN CARE   This patient has a high probability of imminent, clinically significant deterioration, which requires the highest level of preparedness to intervene urgently. I participated in the decision-making and personally managed or directed the management of the following life and organ supporting interventions that required my frequent assessment to treat or prevent imminent deterioration. I personally spent 35 minutes of critical care time. This is time spent at this critically ill patient's bedside actively involved in patient care as well as the coordination of care and discussions with the patient's family. This does not include any procedural time which has been billed separately.     Philippe Ross MD  1/8/2020   1:59 PM

## 2020-01-08 NOTE — PROGRESS NOTES
Occupational therapy [phone] -   01.08.2020    Chart reviewed in prep for OT treatment, RN cleared patient to be seen. Received in bed with eyes closed. Patient briefly opened eyes multiple times, however, no purposeful movement or command following. Patient minimally responsive to verbal, tactile, noxious and vestibular input. Total A to wash face with cold water to attempt to have patient open eyes. Session aborted. Will continue to follow. Frederic Nicolas MS, OTR/L  Time spent with patient: 16 minutes

## 2020-01-08 NOTE — PROGRESS NOTES
1930: Bedside and Verbal shift change report given to [address] (oncoming nurse) by Bib Cartwright RN (offgoing nurse). Report included the following information SBAR, Kardex, Intake/Output, MAR, Recent Results, Cardiac Rhythm NSR, Alarm Parameters  and Dual Neuro Assessment.     0306: Pt transported to CT with RN, pt transport, and on monitor. 4692: Pt returned to ICU. [facility] 0400: Pt is more alert this hour, reaching for [address] and pulling at gown and leads. Soft wrist restraints applied for safety. 0730: Bedside and Verbal shift change report given to [address] (oncoming nurse) by Domi Garcia RN (offgoing nurse). Report included the following information SBAR, Kardex, ED Summary, Procedure Summary, Intake/Output, MAR, Recent Results, Cardiac Rhythm NSR, Alarm Parameters  and Dual Neuro Assessment.

## 2020-01-08 NOTE — PROGRESS NOTES
NUTRITION       Chart reviewed; discussed during interdisciplinary round. Ms Julio C Nichole is tolerating enteral feedingl at goal. Potassium and phosphorus replaced today. RD to follow.       Jean Moscoso, RD CNSC

## 2020-01-08 NOTE — PROGRESS NOTES
Neurology Progress Note  Ehsan Madden NP  Neurocritical Care Nurse Practitioner  [phone]    Admit Date: 2020   LOS: 6 days      Daily Progress Note: 2020    POD: 6 Days Post-Op    S/P: Procedure(s):  CRANIOTOMY LEFT FRONTAL FOR SUBDURAL HEMATOMA    HPI:  71 y.o. female with Down's syndrome, hypothyroidism, and chronic SDH was admitted with encephalopathy. She was found to have bilateral chronic subdural hematomas, left larger than right, with rightward midline shift and descending transtentorial herniation upon admission. She is  now s/p evacuation of L SDH on 20. Neurology was consulted due to reported focal (R facial twitching) as well as questionable generalized seizure activity of the  b/l UE and face noted on . CT head on  with L SDH evacuation and postoperative findings.  CT showed slight interval increase in left subdural hematoma status post removal of drainage catheter, with slight increase of midline shift to the right. EEG  with moderate to severe encephalopathy, intermittent sharply contoured waves without electrographic seizure activity. 24h EEG, completed 1/6 a.m., was very limited due to dislodgement of several electrodes  but readable portions do not convincingly show seizure activity. The background continues to indicate generalized encephalopathic process. Subjective:   Patient with more voluntary movement this morning than last night. According to nurse, patient pulled at her NG tube. She appears restless. Does not open eyes to voice or noxious stimuli. However, did open her eyes spontaneously and looked at staff member when he was changing her BP cuff. Some tremor-like movements noted but no visible seizure activity noted overnight. No Known Allergies    Review of Systems:  Pertinent items are noted in the History of Present Illness.     Objective:   Vital signs  Temp (24hrs), Av.2 °F (36.8 °C), Min:97.8 °F (36.6 °C), Max:98.9 °F (37.2 °C) 01/07 1901 - 01/08 0700  In: 560   Out: 401 [Urine:400]  01/06 0701 - 01/07 1900  In: 2280 [I.V.:1430]  Out: 0017 [Urine:1675]  Visit Vitals  /58   Pulse 98   Temp 98.5 °F (36.9 °C)   Resp 17   Ht 4' 5\" (1.346 m)   Wt 62 kg (136 lb 11 oz)   SpO2 94%   BMI 34.21 kg/m²    O2 Flow Rate (L/min): 2 l/min O2 Device: Nasal cannula   Vitals:    01/07/20 2200 01/07/20 2300 01/08/20 0000 01/08/20 0100   BP: 118/59 118/49 121/79 123/58   Pulse: 90 88 94 98   Resp: 18 17 14 17   Temp:   98.5 °F (36.9 °C)    SpO2: 96% 92% 96% 94%   Weight:       Height:            Physical Exam:  GENERAL: Calm, cooperative, NAD  Cardiac: NSR, RRR  Lungs: CTA, unlabored breathing  Abdomen: soft/NT/not distended, BS present  SKIN: Warm, dry, color appropriate for ethnicity. Neurologic Exam:  Restless in bed but not arousable. Did not open eyes voluntarily for me. Nonverbal and does not follow commands. PERRL. Right eye esotropic at baseline. Left eye with midline gaze. No blink to threat bilaterally. Spontaneous movements in BLEs. Withdraws from painful stimulus x 4 extremities. Mute Babinski's bilaterally. .    Labs:  Lab Results   Component Value Date/Time    WBC 8.2 01/06/2020 02:16 AM    HGB 12.9 01/06/2020 02:16 AM    HCT 40.0 01/06/2020 02:16 AM    PLATELET 811 59/62/6593 02:16 AM    MCV 96.6 01/06/2020 02:16 AM     Lab Results   Component Value Date/Time    Sodium 137 01/07/2020 04:29 AM    Potassium 3.3 (L) 01/07/2020 04:29 AM    Chloride 101 01/07/2020 04:29 AM    CO2 29 01/07/2020 04:29 AM    Anion gap 7 01/07/2020 04:29 AM    Glucose 92 01/07/2020 04:29 AM    BUN 7 01/07/2020 04:29 AM    Creatinine 0.50 (L) 01/07/2020 04:29 AM    BUN/Creatinine ratio 14 01/07/2020 04:29 AM    GFR est AA >60 01/07/2020 04:29 AM    GFR est non-AA >60 01/07/2020 04:29 AM    Calcium 8.3 (L) 01/07/2020 04:29 AM       Imaging:    CT Results (maximum last 3):   Results from [place] encounter on 01/02/20   CT HEAD WO CONT    Narrative EXAM: CT HEAD WO CONT    INDICATION: assess bleed    COMPARISON: January 4. CONTRAST: None. TECHNIQUE: Unenhanced CT of the head was performed using 5 mm images. Brain and  bone windows were generated. CT dose reduction was achieved through use of a  standardized protocol tailored for this examination and automatic exposure  control for dose modulation. FINDINGS:  There is slight shift of the midline to the right of 3.1 mm, previously 2.0 mm. There is no significant white matter disease. The left subdural hematoma  measures 14.7 mm, previously 13.4 mm adjacent to left frontal lobe and 9.3 mm  adjacent to the left parietal lobe, previously 8.3 mm. The fluid collection  adjacent to the right frontal lobe measures 11.7 mm, previously 11.4 mm. The  drainage catheter has been removed in the interval. The basilar cisterns are  open. No acute infarct is identified. The bone windows demonstrate craniotomy. There is fluid in the left ethmoid air cells and mucosal thickening of the  maxillary sinus. Impression IMPRESSION: Slight interval increase in left subdural hematoma status post  removal of drainage catheter, with slight increase of midline shift to the  right. CT HEAD WO CONT    Narrative EXAM:  CT HEAD WO CONT    INDICATION:   post op craniotomy    COMPARISON: CT head 1/30/2020. TECHNIQUE: Unenhanced CT of the head was performed using 5 mm images. Brain and  bone windows were generated. CT dose reduction was achieved through use of a  standardized protocol tailored for this examination and automatic exposure  control for dose modulation. FINDINGS:  Stable postsurgical changes of left frontal craniotomy and chronic subdural  hematoma evacuation, with a left-sided subdural drain in place. No significant  change in size of mixed density left cerebral convexity subdural hematoma with  acute hyperdense components, measuring up to 11 mm in maximal thickness.   Scattered left cerebral pneumocephalus is unchanged. There is unchanged minimal  2 mm of rightward midline shift. There is an unchanged chronic right subdural  hematoma measuring up to 11 mm in maximal thickness. No acute hyperdense  component is identified within this subdural hematoma. The ventricles are normal in size and position. Basilar cisterns are patent. No  evidence of acute infarct. The paranasal sinuses, mastoid air cells, and middle  ears are clear. Unchanged medially deviated right globe. Impression IMPRESSION:  1. Expected postoperative changes of left subdural hematoma evacuation as above,  with no significant change in size of mixed density left subdural hematoma with  acute hyperdense components. Stable chronic right subdural hematoma. Stable  minimal 2 mm of rightward midline shift. CT HEAD WO CONT    Narrative EXAM:  CT HEAD WO CONT  INDICATION:  assess subdural hematoma post drainage. TECHNIQUE:   Thin axial images were obtained through the calvarium and saved with standard  and bone window algorithm. Coronal and sagittal reconstructions were generated. CT dose reduction was achieved through use of a standardized protocol tailored  for this examination and automatic exposure control for dose modulation. COMPARISON: CT head yesterday. Previous CT head 11/20/18  FINDINGS:  Since the prior CT patient is undergone a left frontal craniotomy for treatment  of large chronic primarily left-sided multiloculated subdural hematoma which had  significant mass effect, developing herniation as well as developing right-sided  hydrocephalus. Postoperative findings are demonstrated with subdural drain in place. Residual  extra-axial collection on the left has higher density consistent with more acute  blood was some gas/air. Some root persistent effacement of left convexity sulci.   This represents a marked interval improvement from the preoperative exam and  there has been near complete return of central structures to the midline. There is a chronic right subdural hematoma with some effacement of the sulci. This appears somewhat more prominent on today's exam, however it is likely in  part related to interval decompression of the brain and expansion causing  increased constant acuity of the right sided subdural hematoma over the frontal  and temporal convexity. Small amount of high density blood in the dependent  posterior right subdural similar to the preoperative exam with no interval new  hemorrhage demonstrated. Otherwise, cerebral density is within normal limits with no evidence of acute  brain infarction. Impression IMPRESSION:  1. Interval left frontal craniotomy and drainage of large left multiloculated  chronic subdural hematoma. 2. Postoperative findings on the left as described above. 3. Residual right frontal/temporal convexity chronic subdural hematoma without  definite interval change. 4. Near complete return of central structures to the midline. Assessment:   Principal Problem:    SDH (subdural hematoma) (HCC) (1/2/2020)    Active Problems:    Down's syndrome (1/2/2020)      Acquired hypothyroidism (1/2/2020)      Seizures ([address] (1/6/2020)      Plan:     Seizures              - Continue Keppra 1500 mg bid and Vimpat 100 mg bid              - Continue to avoid overuse of sedatives/Ativan for non-epileptic tremors.    -Continue q 2 h Neuro checks. Chronic subdural hematomas of mixed chronicity   -s/p crani on 01/02   -followed by Neurosurgery   -repeat head CT this am      Plan discussed with  and the primary RN. Further recommendations to follow droraysa Myeer.     Sofie Kayser, KEDAR  Neurocritical Care Nurse Practitioner

## 2020-01-08 NOTE — PROGRESS NOTES
SLP Contact Note    Patient remains inappropriate for SLP treatment 2/2 lethargy. Therefore, will sign-off SLP for now. Please reconsult if/when patient is appropriate to participate in SLP/dysphagia treatment.       Thank you,  ZACK RosasEd, [id number] [facility]  Speech-Language Pathologist

## 2020-01-08 NOTE — PROGRESS NOTES
[facility]    ICU TEAM Progress Note    Name: Yasmany Greco   : 1955   MRN: [id number]   Date: 2020      Subjective:   Progress Note: 2020    CC: Altered mental status  Reason for ICU Admission:   Chronic SDH  Craniotomy for drain SDH  Down Syndrome  Anxiety    Overnight Events: Somnolent this AM,moving all ext spontaneously. No eye opening at this time    S/P: Procedure(s):  CRANIOTOMY LEFT FRONTAL FORSUBDURAL HEMATOMA    Active Problem List:     Problem List  Date Reviewed: 2020          Codes Class    Seizures ([facility] ICD-10-CM: R56.9  ICD-9-CM: 780.39         * (Principal) SDH (subdural hematoma) ([facility] [address] ICD-10-CM: K58.1J7V  ICD-9-CM: 432.1         Down's syndrome (Chronic) ICD-10-CM: Q90.9  ICD-9-CM: 758.0         Acquired hypothyroidism (Chronic) ICD-10-CM: E03.9  ICD-9-CM: 244.9               Past Medical History:      has a past medical history of Endocrine disease and Psychiatric disorder. Past Surgical History:      has a past surgical history that includes hx cholecystectomy.     Medications:     Current Facility-Administered Medications   Medication Dose Route Frequency    potassium, sodium phosphates (NEUTRA-PHOS) packet 2 Packet  2 Packet Oral BID    lacosamide (VIMPAT) tablet 100 mg  100 mg Per NG tube Q12H    levETIRAcetam (KEPPRA) oral solution 1,500 mg  1,500 mg Per NG tube Q12H    levothyroxine (SYNTHROID) tablet 75 mcg  75 mcg Per NG tube DAILY    acetaminophen (TYLENOL) suppository 650 mg  650 mg Rectal Q4H PRN    dextrose 10 % infusion 125-250 mL  125-250 mL IntraVENous PRN    famotidine (PF) (PEPCID) 20 mg in 0.9% sodium chloride 10 mL injection  20 mg IntraVENous Q12H    miconazole (MICOTIN) 2 % powder   Topical BID    sodium chloride (NS) flush 5-40 mL  5-40 mL IntraVENous Q8H    sodium chloride (NS) flush 5-40 mL  5-40 mL IntraVENous PRN    acetaminophen (TYLENOL) solution 650 mg  650 mg Oral Q4H PRN    HYDROcodone-acetaminophen (NORCO) 5-325 mg per tablet 1 Tab  1 Tab Oral Q4H PRN    ondansetron (ZOFRAN) injection 4 mg  4 mg IntraVENous Q4H PRN    fentaNYL citrate (PF) injection 25-50 mcg  25-50 mcg IntraVENous Q1H PRN    sodium chloride (NS) flush 5-40 mL  5-40 mL IntraVENous Q8H    sodium chloride (NS) flush 5-40 mL  5-40 mL IntraVENous PRN    naloxone (NARCAN) injection 0.4 mg  0.4 mg IntraVENous PRN       Allergies/Social/Family History:     No Known Allergies   Social History     Tobacco Use    Smoking status: Never Smoker    Smokeless tobacco: Never Used   Substance Use Topics    Alcohol use: No      History reviewed. No pertinent family history. Review of Systems:     Review of systems not obtained due to patient factors.     Objective:   Vital Signs:  Visit Vitals  /55 (BP 1 Location: Left arm, BP Patient Position: At rest)   Pulse 82   Temp 98 °F (36.7 °C)   Resp 21   Ht 4' 5\" (1.346 m)   Wt 62 kg (136 lb 11 oz)   SpO2 98%   BMI 34.21 kg/m²    O2 Flow Rate (L/min): 2 l/min O2 Device: Nasal cannula Temp (24hrs), Av.2 °F (36.8 °C), Min:97.8 °F (36.6 °C), Max:99.1 °F (37.3 °C)           Intake/Output:     Intake/Output Summary (Last 24 hours) at 2020 1056  Last data filed at 2020 0800  Gross per 24 hour   Intake 2000 ml   Output 926 ml   Net 1074 ml       Physical Exam:    General:  female, Down Syndrome features  Eye:  conjunctivae/corneas clear  Neurologic:  Somnolent, does not open eyes, does not follow commands  Neck:  neck supple and symmetrical.     Lungs: Diminished bilaterally, good cough   Heart:  RR, S1, S2 normal  Abdomen:  soft, non-tender  Skin:  Warm, dry     LABS AND  DATA: Personally reviewed  Recent Labs     20  0351 20  0216   WBC 6.9 8.2   HGB 13.2 12.9   HCT 39.3 40.0    220     Recent Labs     20  0351 20  0429    137   K 3.4* 3.3*    101   CO2 33* 29   BUN 8 7   CREA 0.59 0.50*   * 92   CA 8.1* 8.3*   MG 2.0 2.1   PHOS 1.9* 1.8*     No results for input(s): SGOT, GPT, AP, TBIL, TP, ALB, GLOB, AML, LPSE in the last 72 hours. No lab exists for component: AMYP  No results for input(s): INR, PTP, APTT, INREXT, INREXT in the last 72 hours. No results for input(s): PHI, PCO2I, PO2I, FIO2I in the last 72 hours. No results for input(s): CPK, CKMB, TROIQ, BNPP in the last 72 hours. Hemodynamics:   PAP:   CO:     Wedge:   CI:     CVP:    SVR:       PVR:       Ventilator Settings:  Mode Rate Tidal Volume Pressure FiO2 PEEP                    Peak airway pressure:      Minute ventilation:          MEDS: Reviewed    CT head: personally reviewed and report checked      Assessment:     ICU Problems:  - Chronic SDH  - Tonic clonic Seizure  - Craniotomy for SDH decompression  - Down Syndrome  - Anxiety    ICU Comprehensive Plan of Care:   Plans for this Shift:   1. Continue q2h Neuro checks, continue to have SDH of mixed chronicity, CT improved compared to 1/7/20  2. Monitor neuro status, neurology consulted for seizure activity, no additional events overnight   3. 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 Electrolyte replacement   5. PT/OT/Speech tx  6. Hold all anxiolytics  7. Continue antiepileptics, if continue to observe seizure activity, will increase antiepileptic dosing  8. RT for pulmonary toileting, ante suction, CPT    Multidisciplinary Rounds Completed: Yes    ABCDEF Bundle/Checklist  Pain Medications: Acetaminophen  Target RASS: N/A  Sedation Medications: None  CAM-ICU:  unable to assess  Mobility: Poor  PT/OT: PT consulted and on board, OT consulted and on board and Speech therapy consulted and on board   Restraints: Soft wrist restraints  Discussed Plan of Care (goals of care):  Yes  Addressed Code Status: Full Code    CARDIOVASCULAR  Cardiac Gtts: None  SBP Goal of: < 140 mmHg  MAP Goal of: < 90 mmHg  Transfusion Trigger (Hgb): <7 g/dL    RESPIRATORY  Vent Goals: Aggressive bronchopulmonary hygiene  DVT Prophylaxis (if no, list reason): SCD's or Sequential Compression Device   SPO2 Goal: > 92%  Pulmonary toilet: as best as possible     GI/  [place] Catheter Present: No  GI Prophylaxis: Pepcid (famotidine)   Nutrition: NGT placement    IVFs: discontinued  Bowel Movement: Yes  Bowel Regimen: MiraLax  Insulin:  Sliding scale PRN    ANTIBIOTICS  Antibiotics:  none    T/L/D  Tubes: Nasogastric Tube  Lines: Peripheral IV  Drains: None    SPECIAL EQUIPMENT  None    DISPOSITION  Stay in ICU    CRITICAL CARE CONSULTANT NOTE  I had a face to face encounter with the patient, reviewed and interpreted patient data including clinical events, labs, images, vital signs, I/O's, and examined patient. I have discussed the case and the plan and management of the patient's care with the consulting services, the bedside nurses and the respiratory therapist.    NOTE OF PERSONAL INVOLVEMENT IN CARE   This patient has a high probability of imminent, clinically significant deterioration, which requires the highest level of preparedness to intervene urgently. I participated in the decision-making and personally managed or directed the management of the following life and organ supporting interventions that required my frequent assessment to treat or prevent imminent deterioration. I personally spent 32 minutes of critical care time. This is time spent at this critically ill patient's bedside actively involved in patient care as well as the coordination of care and discussions with the patient's family. This does not include any procedural time which has been billed separately.     Flavio Hopper MD  1/8/2020

## 2020-01-09 NOTE — PROGRESS NOTES
Occupational therapy 0915 -   01.09.2020    Chart reviewed in prep for OT treatment, discussed patient case with PT who spoke with RN. EEG technician at bedside at this time setting up for EEG. RN did report patient is opening eyes to voice more than yesterday. Will defer at this time and continue to follow. Thank you. Frederic Aguilar MS, OTR/L

## 2020-01-09 NOTE — PROGRESS NOTES
Neurosurgery Progress Note  Lisa Lerma, [facility]  [phone]        Admit Date: 2020   LOS: 7 days        Daily Progress Note: 2020    POD: 7 Day Post-Op    S/P: Procedure(s):  CRANIOTOMY LEFT FRONTAL FOR SUBDURAL HEMATOMA    HPI: The patient has a history of Down's syndrome. She was brought to the ER by caregivers due to lethargy and confusion. She also has been having balance issues and difficulty standing recently. She was found to have a large chronic SDH on the left with membranes in it. She transferred to [facility] where she was taken to the OR by Dr. Monica Yanes for a craniotomy to evacuate the SDH. Subjective: The patient was placed on a 24 hour EEG this morning. She wakes up more quickly to voice and looks at me. She also will track with her eyes. Unable to obtain ROS due to generalized encephalopathy. Objective:     Vital signs  Temp (24hrs), Av.2 °F (37.3 °C), Min:98.3 °F (36.8 °C), Max:100 °F (37.8 °C)   701 - 1900  In: 250 [I.V.:50]  Out: 350 [Urine:350]  1901 -  0700  In: 9848 [I.V.:150]  Out: 7976 [Urine:1550]    Visit Vitals  /58   Pulse 78   Temp 98.3 °F (36.8 °C)   Resp 11   Ht 4' 5\" (1.346 m)   Wt 62 kg (136 lb 11 oz)   SpO2 99%   BMI 34.21 kg/m²    O2 Flow Rate (L/min): 2 l/min O2 Device: Nasal cannula     Pain control  Pain Assessment  Pain Scale 1: Adult Nonverbal Pain Scale    PT/OT  Gait     Gait  Base of Support: Widened  Speed/Sharmila: Shuffled  Gait Abnormalities: Decreased step clearance, Trendelenburg, Trunk sway increased, Shuffling gait  Ambulation - Level of Assistance: Minimal assistance, Assist x2  Distance (ft): 30 Feet (ft)  Assistive Device: Gait belt(bilateral HHA)           Physical Exam:  Gen:NAD. Neuro: Lethargic. Opens eyes to voice and looks at me. Tracks with her eyes and follws her stuffed animal back and forth. Does not follow commands. Not talking to me. LOVE spontaneously just not to command. Pupils pinpoint.  Right eye medial deviation. Dysconjugate gaze. Skin: Left scalp incision C/D/I with staples in place. CT head without contrast on 01/02/20 shows predominantly hypodense left cerebral convexity subdural fluid collection with hyperdense membranes, most consistent with a chronic subdural hematoma. Mass effect results in 10 mm of rightward midline shift and descending transtentorial herniation with effacement of the suprasellar cistern. No large acute component is identified within the subdural hematoma. Additional small right cerebral convexity hypodense subdural fluid collection measuring up to 8 mm in maximal thickness, most consistent with an additional chronic subdural hematoma. CT head without contrast on 01/03/20 shows interval left frontal craniotomy and drainage of large left multiloculated chronic subdural hematoma. Postoperative findings on the left. Residual right frontal/temporal convexity chronic subdural hematoma without definite interval change. Near complete return of central structures to the midline. CT head without contrast on 01/04/20 shows expected postoperative changes of left subdural hematoma evacuation as above, with no significant change in size of mixed density left subdural hematoma with acute hyperdense components. Stable chronic right subdural hematoma. Stable minimal 2 mm of rightward midline shift. CT head without contrast on 01/07/20 shows slight interval increase in left subdural hematoma status post removal of drainage catheter, with slight increase of midline shift to the right.     24 hour results:    Recent Results (from the past 24 hour(s))   METABOLIC PANEL, BASIC    Collection Time: 01/09/20  3:08 AM   Result Value Ref Range    Sodium 140 136 - 145 mmol/L    Potassium 3.4 (L) 3.5 - 5.1 mmol/L    Chloride 103 97 - 108 mmol/L    CO2 35 (H) 21 - 32 mmol/L    Anion gap 2 (L) 5 - 15 mmol/L    Glucose 141 (H) 65 - 100 mg/dL    BUN 6 6 - 20 MG/DL    Creatinine 0.56 0.55 - 1.02 MG/DL    BUN/Creatinine ratio 11 (L) 12 - 20      GFR est AA >60 >60 ml/min/1.73m2    GFR est non-AA >60 >60 ml/min/1.73m2    Calcium 8.1 (L) 8.5 - 10.1 MG/DL   CBC WITH AUTOMATED DIFF    Collection Time: 01/09/20  3:08 AM   Result Value Ref Range    WBC 5.6 3.6 - 11.0 K/uL    RBC 4.17 3.80 - 5.20 M/uL    HGB 13.1 11.5 - 16.0 g/dL    HCT 39.4 35.0 - 47.0 %    MCV 94.5 80.0 - 99.0 FL    MCH 31.4 26.0 - 34.0 PG    MCHC 33.2 30.0 - 36.5 g/dL    RDW 12.7 11.5 - 14.5 %    PLATELET 750 003 - 366 K/uL    MPV 10.1 8.9 - 12.9 FL    NRBC 0.0 0  WBC    ABSOLUTE NRBC 0.00 0.00 - 0.01 K/uL    NEUTROPHILS 72 32 - 75 %    LYMPHOCYTES 13 12 - 49 %    MONOCYTES 12 5 - 13 %    EOSINOPHILS 2 0 - 7 %    BASOPHILS 1 0 - 1 %    IMMATURE GRANULOCYTES 0 0.0 - 0.5 %    ABS. NEUTROPHILS 4.0 1.8 - 8.0 K/UL    ABS. LYMPHOCYTES 0.7 (L) 0.8 - 3.5 K/UL    ABS. MONOCYTES 0.7 0.0 - 1.0 K/UL    ABS. EOSINOPHILS 0.1 0.0 - 0.4 K/UL    ABS. BASOPHILS 0.1 0.0 - 0.1 K/UL    ABS. IMM. GRANS. 0.0 0.00 - 0.04 K/UL    DF SMEAR SCANNED      RBC COMMENTS NORMOCYTIC, NORMOCHROMIC     PHOSPHORUS    Collection Time: 01/09/20  3:08 AM   Result Value Ref Range    Phosphorus 2.9 2.6 - 4.7 MG/DL   MAGNESIUM    Collection Time: 01/09/20  3:08 AM   Result Value Ref Range    Magnesium 1.9 1.6 - 2.4 mg/dL          Assessment:     Principal Problem:    SDH (subdural hematoma) ([facility] (1/2/2020)    Active Problems:    Down's syndrome (1/2/2020)      Acquired hypothyroidism (1/2/2020)      Seizures ([address] (1/6/2020)        Plan:   1. Chronic subdural hematoma   - s/p crani 01/02   - neuro checks every 2 hours   - PT/OT/Speech as able   - Cont Keppra. Will check a keppra level  2. Brain compression   - due to #1   - plans as above  3. Down's syndrome   - Sitter PRN   - Supportive care  4. Hypothyroidism    - cont levothyroxine from home  5. Seizures   - Neurology following   - Pt on Keppra 1500 mg bid and Vimpat 100 mg bid   - Ativan PRN  6.  Acute metabolic encephalopathy   - due to #5 and likely due to medications given for seizures   - plans as above    Activity: up with assist  DVT ppx: SCDs  Dispo: tbd    Plan d/w Dr. Ama Rose. No further surgical intervention at this time. Suspect patient just needs some time to clear the Ativan from her system. Would continue to monitor in ICU to make sure she does not have further seizures. The slight change in the SDH this am would not make her continue to be obtunded. I think she is likely a slow metabolizer of medications and she received 15 mg of IV Ativan in 48 hours as well as a total of 5g of Keppra on Monday. Would continue to observe and give her time to flush out the medications. Family states patient was in a SNF for a week of respite before and that they would not want her to go back to a SNF. She was not well-taken care of there and they desire her to be cared for at home. Explained to family that if she ended up getting to the point where she felt like she needed another surgery, we would have a discussion with the family about risks, benefits, and alternatives of possible comfort care. We are not at that point yet. Also discussed with Dr. Maryana Ospina. If no subclinical seizures seen on EEG, she will reduce some of her AEDs.       Kaylie Smith NP

## 2020-01-09 NOTE — PROGRESS NOTES
Physical therapy:    Chart reviewed and spoke with RN. EEG technician at bedside at this time setting up for EEG. RN did report pt is opening eyes to voice more than yesterday. Will defer at this time and continue to follow.  Thank you    Ramy Bliss, PT, DPT

## 2020-01-09 NOTE — PROGRESS NOTES
1930: Bedside and Verbal shift change report given to [address] (oncoming nurse) by Shannon Shaw RN (offgoing nurse). Report included the following information SBAR, Kardex, Procedure Summary, Intake/Output, MAR, Recent Results, Cardiac Rhythm NSR, Alarm Parameters  and Dual Neuro Assessment. 0730: Bedside and Verbal shift change report given to [address] (oncoming nurse) by Juana Brittle RN (offgoing nurse). Report included the following information SBAR, Kardex, Intake/Output, MAR, Recent Results, Cardiac Rhythm NSR, Alarm Parameters  and Dual Neuro Assessment.

## 2020-01-09 NOTE — PROGRESS NOTES
Bedside and Verbal shift change report given to Maggy Thomas  (oncoming nurse) by Ralph Lee (offgoing nurse). Report included the following information SBAR, Intake/Output and Cardiac Rhythm NSR.      Primary Nurse Haven Lucia RN and Ralph Lee RN performed a dual skin assessment on this patient No impairment noted  Yosef score is 13

## 2020-01-09 NOTE — PROGRESS NOTES
Neurology Progress Note  Denis Mendoza NP  Neurocritical Care Nurse Practitioner  [phone]    Admit Date: 2020   LOS: 7 days      Daily Progress Note: 2020    POD: 7 Days Post-Op    S/P: Procedure(s):  CRANIOTOMY LEFT FRONTAL FOR SUBDURAL HEMATOMA    HPI:  71 y.o. female with Down's syndrome, hypothyroidism, and chronic SDH was admitted with encephalopathy. She was found to have bilateral chronic subdural hematomas, left larger than right, with rightward midline shift and descending transtentorial herniation upon admission. She is  now s/p evacuation of L SDH on 20. Neurology was consulted due to reported focal (R facial twitching) as well as questionable generalized seizure activity of the  b/l UE and face noted on . CT head on  with L SDH evacuation and postoperative findings.  CT showed slight interval increase in left subdural hematoma status post removal of drainage catheter, with slight increase of midline shift to the right. CT head repeated  showed improved R frontal SDH and MLS. EEG  with moderate to severe encephalopathy, intermittent sharply contoured waves without electrographic seizure activity. 24h EEG, completed 1/6 a.m., was very limited due to dislodgement of several electrodes  but readable portions do not convincingly show seizure activity. The background continues to indicate generalized encephalopathic process. Another 24 EEG ordered by Dr. Peter Parmar. Subjective:   Patient somewhat restless in bed. Some tremor-like movements noted but no visible seizure activity noted overnight. Opened her eyes to voice today. Still not following commands. No Known Allergies    Review of Systems:  Pertinent items are noted in the History of Present Illness.     Objective:   Vital signs  Temp (24hrs), Av.1 °F (37.3 °C), Min:98 °F (36.7 °C), Max:100 °F (37.8 °C)   1901 -  07  In: 555   Out: 50 [Urine:50]  701 - 1900  In: 1872 [I.V.:960]  Out: 1776 [Urine:1775]  Visit Vitals  /61 (BP 1 Location: Left arm, BP Patient Position: At rest)   Pulse 91   Temp 100 °F (37.8 °C)   Resp 22   Ht 4' 5\" (1.346 m)   Wt 62 kg (136 lb 11 oz)   SpO2 99%   BMI 34.21 kg/m²    O2 Flow Rate (L/min): 2 l/min O2 Device: Nasal cannula   Vitals:    01/08/20 2100 01/08/20 2200 01/08/20 2300 01/09/20 0000   BP: 120/64 116/66 132/68 119/61   Pulse: 95 93 99 91   Resp: 15 15 16 22   Temp:    100 °F (37.8 °C)   SpO2: 97% 96% 95% 99%   Weight:       Height:            Physical Exam:  GENERAL: Calm, cooperative, NAD  Cardiac: NSR, RRR  Lungs: CTA, unlabored breathing  Abdomen: soft/NT/not distended, BS present  SKIN: Warm, dry, color appropriate for ethnicity. Neurologic Exam:  Restless in bed. Opens eyes to voice, but had to hold them open to examine pupils. Nonverbal and does not follow commands. PERRL. Right eye esotropic at baseline. Left eye with midline gaze. No blink to threat bilaterally. Spontaneous movements x 4 extremities. Withdraws from painful stimulus x 4 extremities. Mute Babinski's bilaterally. .    Labs:  Lab Results   Component Value Date/Time    WBC 6.9 01/08/2020 03:51 AM    HGB 13.2 01/08/2020 03:51 AM    HCT 39.3 01/08/2020 03:51 AM    PLATELET 156 03/94/5133 03:51 AM    MCV 94.5 01/08/2020 03:51 AM     Lab Results   Component Value Date/Time    Sodium 141 01/08/2020 03:51 AM    Potassium 3.4 (L) 01/08/2020 03:51 AM    Chloride 104 01/08/2020 03:51 AM    CO2 33 (H) 01/08/2020 03:51 AM    Anion gap 4 (L) 01/08/2020 03:51 AM    Glucose 140 (H) 01/08/2020 03:51 AM    BUN 8 01/08/2020 03:51 AM    Creatinine 0.59 01/08/2020 03:51 AM    BUN/Creatinine ratio 14 01/08/2020 03:51 AM    GFR est AA >60 01/08/2020 03:51 AM    GFR est non-AA >60 01/08/2020 03:51 AM    Calcium 8.1 (L) 01/08/2020 03:51 AM       Imaging:    CT Results (maximum last 3):   Results from [place] encounter on 01/02/20   CT HEAD WO CONT    Narrative EXAM: CT HEAD WO CONT    INDICATION: AMS, L SDH    COMPARISON: None. CONTRAST: None. TECHNIQUE: Unenhanced CT of the head was performed using 5 mm images. Brain and  bone windows were generated. CT dose reduction was achieved through use of a  standardized protocol tailored for this examination and automatic exposure  control for dose modulation. FINDINGS:  There is persistent shift of the midline to the right by 2.5 mm, previously 3.1  mm. There is mild hydrocephalus which is stable. There is no significant white  matter disease. The fluid collection adjacent to the right frontal lobe measures  10.8 mm and previously measured 11.7 mm. The mixed subdural hematoma adjacent to  left frontal lobe measures 13.1 mm and previously measured 12.9 mm. The mixed  subdural hematoma adjacent to the left parietal lobe measures 12.2 mm and  previously measured 12.0 mm. Hematoma adjacent to left frontal lobe measures 9.5  mm and previously measured 9.3 mm. The basilar cisterns are open. No acute  infarct is identified. The bone windows demonstrate craniectomy. The visualized  portions of the paranasal sinuses and mastoid air cells are clear. Impression IMPRESSION: Interval slight improvement in midline shift. CT HEAD WO CONT    Narrative EXAM: CT HEAD WO CONT    INDICATION: assess bleed    COMPARISON: January 4. CONTRAST: None. TECHNIQUE: Unenhanced CT of the head was performed using 5 mm images. Brain and  bone windows were generated. CT dose reduction was achieved through use of a  standardized protocol tailored for this examination and automatic exposure  control for dose modulation. FINDINGS:  There is slight shift of the midline to the right of 3.1 mm, previously 2.0 mm. There is no significant white matter disease. The left subdural hematoma  measures 14.7 mm, previously 13.4 mm adjacent to left frontal lobe and 9.3 mm  adjacent to the left parietal lobe, previously 8.3 mm.  The fluid collection  adjacent to the right frontal lobe measures 11.7 mm, previously 11.4 mm. The  drainage catheter has been removed in the interval. The basilar cisterns are  open. No acute infarct is identified. The bone windows demonstrate craniotomy. There is fluid in the left ethmoid air cells and mucosal thickening of the  maxillary sinus. Impression IMPRESSION: Slight interval increase in left subdural hematoma status post  removal of drainage catheter, with slight increase of midline shift to the  right. CT HEAD WO CONT    Narrative EXAM:  CT HEAD WO CONT    INDICATION:   post op craniotomy    COMPARISON: CT head 1/30/2020. TECHNIQUE: Unenhanced CT of the head was performed using 5 mm images. Brain and  bone windows were generated. CT dose reduction was achieved through use of a  standardized protocol tailored for this examination and automatic exposure  control for dose modulation. FINDINGS:  Stable postsurgical changes of left frontal craniotomy and chronic subdural  hematoma evacuation, with a left-sided subdural drain in place. No significant  change in size of mixed density left cerebral convexity subdural hematoma with  acute hyperdense components, measuring up to 11 mm in maximal thickness. Scattered left cerebral pneumocephalus is unchanged. There is unchanged minimal  2 mm of rightward midline shift. There is an unchanged chronic right subdural  hematoma measuring up to 11 mm in maximal thickness. No acute hyperdense  component is identified within this subdural hematoma. The ventricles are normal in size and position. Basilar cisterns are patent. No  evidence of acute infarct. The paranasal sinuses, mastoid air cells, and middle  ears are clear. Unchanged medially deviated right globe. Impression IMPRESSION:  1.  Expected postoperative changes of left subdural hematoma evacuation as above,  with no significant change in size of mixed density left subdural hematoma with  acute hyperdense components. Stable chronic right subdural hematoma. Stable  minimal 2 mm of rightward midline shift. Assessment:   Principal Problem:    SDH (subdural hematoma) ([facility]) (1/2/2020)    Active Problems:    Down's syndrome (1/2/2020)      Acquired hypothyroidism (1/2/2020)      Seizures ([address] (1/6/2020)      Plan:     Seizures              - Continue Keppra 1500 mg bid and Vimpat 100 mg bid              - Continue to avoid overuse of sedatives/Ativan for non-epileptic tremors.    -Continue q 2 h Neuro checks.   -Awaiting repeat 24 h EEG due to persistent encephalopathy to exclude subclinical seizures    Chronic subdural hematomas of mixed chronicity   -s/p crani on 01/02   -followed by Neurosurgery        Plan discussed with  and the primary RN. Further recommendations to follow droraysa Lozano. Lisseth Egan, NP  Neurocritical Care Nurse Practitioner    Staff Addendum:  I have reviewed the documentation provided by the nurse practitioner, discussed her findings, clinical impression, and the proposed management plans with regards to this encounter. I have personally evaluated the patient and verified the history and confirmed the physical findings. Below are my additional findings:  71year old female with Down's syndrome, chronic SDH admitted with encephalopathy, now s/p evacuation of L SDH 1/3/20.   Neurology consulted due to reported focal (R facial twitching) as well as questionable generalized seizure activity b/l UE and face noted 1/4. EEG 1/4 with moderate to severe encephalopathy, intermittent sharply contoured waves without electrographic seizure activity.  24h EEG revealed diffuse slowing without electrographic seizure activity, rare L frontal SWD. No further clinical seizure activity reported.  CT head repeated 1/8 showing improved R frontal SDH and MLS.   Examination is somewhat improved today- arouses to voice, no command following, PERRL, OD medial deviation, no blink to threat, W/D x 4, toes mute b/l. Will upload EEG data from this morning- if no evidence of subclinical seizure activity we will de-escalate her AEDs.      Bing Tillman DO  01/09/20

## 2020-01-10 NOTE — PROGRESS NOTES
Neurosurgery Progress Note  Cari Amaya, [facility]  [phone]        Admit Date: 2020   LOS: 8 days        Daily Progress Note: 1/10/2020    POD: 8 Day Post-Op    S/P: Procedure(s):  CRANIOTOMY LEFT FRONTAL FOR SUBDURAL HEMATOMA    HPI: The patient has a history of Down's syndrome. She was brought to the ER by caregivers due to lethargy and confusion. She also has been having balance issues and difficulty standing recently. She was found to have a large chronic SDH on the left with membranes in it. She transferred to [facility] where she was taken to the OR by Dr. Pravin Medellin for a craniotomy to evacuate the SDH. Subjective:   Precedex was started overnight for agitation and attempting to get out of bed. It caused severe hypotension in the patient, so this has been stopped. She is now back to being sleepy and not waking up as much. Awaiting results of 24 hour EEG completed this morning. Unable to obtain ROS due to generalized encephalopathy. Objective:     Vital signs  Temp (24hrs), Av.3 °F (36.8 °C), Min:97.6 °F (36.4 °C), Max:99.4 °F (37.4 °C)   01/10 0701 - 01/10 1900  In: 225   Out: -   1901 - 01/10 0700  In: 2497.9 [I.V.:112.9]  Out: 1900 [Urine:1900]    Visit Vitals  BP (!) 92/39   Pulse 79   Temp 97.6 °F (36.4 °C)   Resp 15   Ht 4' 5\" (1.346 m)   Wt 60.6 kg (133 lb 9.6 oz)   SpO2 100%   BMI 33.44 kg/m²    O2 Flow Rate (L/min): 2 l/min O2 Device: Nasal cannula     Pain control  Pain Assessment  Pain Scale 1: Adult Nonverbal Pain Scale  Pain Intensity 1: 0    PT/OT  Gait     Gait  Base of Support: Widened  Speed/Sharmila: Shuffled  Gait Abnormalities: Decreased step clearance, Trendelenburg, Trunk sway increased, Shuffling gait  Ambulation - Level of Assistance: Minimal assistance, Assist x2  Distance (ft): 30 Feet (ft)  Assistive Device: Gait belt(bilateral HHA)           Physical Exam:  Gen:NAD. Neuro: Lethargic. Opens eyes to voice and looks at me. Tracks with her eyes.  Does not follow commands. Not talking to me. LOVE spontaneously just not to command. Pupils pinpoint. Right eye medial deviation. Dysconjugate gaze. Skin: Left scalp incision C/D/I with staples in place. CT head without contrast on 01/02/20 shows predominantly hypodense left cerebral convexity subdural fluid collection with hyperdense membranes, most consistent with a chronic subdural hematoma. Mass effect results in 10 mm of rightward midline shift and descending transtentorial herniation with effacement of the suprasellar cistern. No large acute component is identified within the subdural hematoma. Additional small right cerebral convexity hypodense subdural fluid collection measuring up to 8 mm in maximal thickness, most consistent with an additional chronic subdural hematoma. CT head without contrast on 01/03/20 shows interval left frontal craniotomy and drainage of large left multiloculated chronic subdural hematoma. Postoperative findings on the left. Residual right frontal/temporal convexity chronic subdural hematoma without definite interval change. Near complete return of central structures to the midline. CT head without contrast on 01/04/20 shows expected postoperative changes of left subdural hematoma evacuation as above, with no significant change in size of mixed density left subdural hematoma with acute hyperdense components. Stable chronic right subdural hematoma. Stable minimal 2 mm of rightward midline shift. CT head without contrast on 01/07/20 shows slight interval increase in left subdural hematoma status post removal of drainage catheter, with slight increase of midline shift to the right.     24 hour results:    Recent Results (from the past 24 hour(s))   GLUCOSE, POC    Collection Time: 01/10/20 12:02 AM   Result Value Ref Range    Glucose (POC) 114 (H) 65 - 100 mg/dL    Performed by Carlos Ross, BASIC    Collection Time: 01/10/20  3:28 AM   Result Value Ref Range    Sodium 140 136 - 145 mmol/L    Potassium 3.9 3.5 - 5.1 mmol/L    Chloride 104 97 - 108 mmol/L    CO2 33 (H) 21 - 32 mmol/L    Anion gap 3 (L) 5 - 15 mmol/L    Glucose 133 (H) 65 - 100 mg/dL    BUN 12 6 - 20 MG/DL    Creatinine 0.59 0.55 - 1.02 MG/DL    BUN/Creatinine ratio 20 12 - 20      GFR est AA >60 >60 ml/min/1.73m2    GFR est non-AA >60 >60 ml/min/1.73m2    Calcium 8.1 (L) 8.5 - 10.1 MG/DL   CBC WITH AUTOMATED DIFF    Collection Time: 01/10/20  3:28 AM   Result Value Ref Range    WBC 4.8 3.6 - 11.0 K/uL    RBC 3.70 (L) 3.80 - 5.20 M/uL    HGB 11.4 (L) 11.5 - 16.0 g/dL    HCT 35.7 35.0 - 47.0 %    MCV 96.5 80.0 - 99.0 FL    MCH 30.8 26.0 - 34.0 PG    MCHC 31.9 30.0 - 36.5 g/dL    RDW 12.9 11.5 - 14.5 %    PLATELET 940 656 - 861 K/uL    MPV 10.1 8.9 - 12.9 FL    NRBC 0.0 0  WBC    ABSOLUTE NRBC 0.00 0.00 - 0.01 K/uL    NEUTROPHILS 66 32 - 75 %    LYMPHOCYTES 14 12 - 49 %    MONOCYTES 14 (H) 5 - 13 %    EOSINOPHILS 5 0 - 7 %    BASOPHILS 1 0 - 1 %    IMMATURE GRANULOCYTES 0 0.0 - 0.5 %    ABS. NEUTROPHILS 3.2 1.8 - 8.0 K/UL    ABS. LYMPHOCYTES 0.7 (L) 0.8 - 3.5 K/UL    ABS. MONOCYTES 0.7 0.0 - 1.0 K/UL    ABS. EOSINOPHILS 0.2 0.0 - 0.4 K/UL    ABS. BASOPHILS 0.0 0.0 - 0.1 K/UL    ABS. IMM. GRANS. 0.0 0.00 - 0.04 K/UL    DF SMEAR SCANNED      RBC COMMENTS MACROCYTOSIS  1+        RBC COMMENTS NORMOCYTIC, NORMOCHROMIC     PHOSPHORUS    Collection Time: 01/10/20  3:28 AM   Result Value Ref Range    Phosphorus 3.2 2.6 - 4.7 MG/DL   MAGNESIUM    Collection Time: 01/10/20  3:28 AM   Result Value Ref Range    Magnesium 2.0 1.6 - 2.4 mg/dL   GLUCOSE, POC    Collection Time: 01/10/20  6:07 AM   Result Value Ref Range    Glucose (POC) 128 (H) 65 - 100 mg/dL    Performed by Abilio Griffiths           Assessment:     Principal Problem:    SDH (subdural hematoma) ([facility] [address] (1/2/2020)    Active Problems:    Down's syndrome (1/2/2020)      Acquired hypothyroidism (1/2/2020)      Seizures ([address] (1/6/2020)        Plan:   1.  Chronic subdural hematoma   - s/p crani 01/02   - neuro checks every 2 hours   - PT/OT/Speech as able   - Cont Keppra. 2. Brain compression   - due to #1   - plans as above  3. Down's syndrome   - Sitter PRN   - Supportive care  4. Hypothyroidism    - cont levothyroxine from home  5. Seizures   - Neurology following   - Pt on Keppra 1500 mg bid and Vimpat 100 mg bid   - Ativan PRN  6. Acute metabolic encephalopathy   - due to #5 and likely due to medications given for seizures   - plans as above    Activity: up with assist  DVT ppx: SCDs  Dispo: tbd    Plan d/w Dr. Marizol See. No further surgical intervention at this time. Cont to be cautious with medications as she is a slow metabolizer and quite sensitive to them.       Peyton Hallman NP

## 2020-01-10 NOTE — PROGRESS NOTES
0730: Bedside and Verbal shift change report given to [id number] (oncoming nurse) by Randy Ramos (offgoing nurse). Report included the following information SBAR, Kardex, [facility] and Cardiac Rhythm NSR.     1930: Bedside and Verbal shift change report given to [facility] (oncoming nurse) by Julio Cesar Wilson (offgoing nurse). Report included the following information SBAR, Kardex, Intake/Output, MAR, Recent Results and Cardiac Rhythm NSR.

## 2020-01-10 NOTE — PROGRESS NOTES
[facility]    ICU TEAM Progress Note    Name: Birgit Blanton   : 1955   MRN: [id number]   Date: 1/10/2020      Assessment:     ICU Problems:    1. S/p Craniotomy for SDH - 2020  2. Chronic SDH  3. Encephalopathy  4. Seizures - facial twitching  5. Down Syndrome    ICU Comprehensive Plan of Care:     Plans for this Shift:     1. Continue Vimpat and Keppra for now  2. Appreciate Neuro recs. 3. Continue TF's  4. Continue PT/OT  5. Avoid precedex as it causes significant hypotension in her. 6. Give 1lit bolus of LR, closely monitor hemodynamics. Subjective:   Progress Note: 1/10/2020      Reason for ICU Admission: Encephalopathy     Overnight Events: This morning precedex was started as patient was agitated and trying to get out of hospital but then her blood pressure just dropped down now to 40s. Patient remains altered with no significant change. POD:7 Days Post-Op    S/P: Procedure(s):  CRANIOTOMY LEFT FRONTAL FORSUBDURAL HEMATOMA    Active Problem List:     Problem List  Date Reviewed: 2020          Codes Class    Seizures ([facility] [address] ICD-10-CM: R56.9  ICD-9-CM: 780.39         * (Principal) SDH (subdural hematoma) ([facility] [address] ICD-10-CM: W78.2A0D  ICD-9-CM: 432.1         Down's syndrome (Chronic) ICD-10-CM: Q90.9  ICD-9-CM: 758.0         Acquired hypothyroidism (Chronic) ICD-10-CM: E03.9  ICD-9-CM: 244.9               Past Medical History:      has a past medical history of Endocrine disease and Psychiatric disorder. Past Surgical History:      has a past surgical history that includes hx cholecystectomy. Home Medications:     Prior to Admission medications    Medication Sig Start Date End Date Taking? Authorizing Provider   calcium-cholecalciferol, d3, (CALCIUM 600 + D) 600-125 mg-unit tab Take  by mouth. Coleman Fournier MD   QUEtiapine (SEROQUEL) 25 mg tablet Take 25 mg by mouth two (2) times a day. Coleman Fournier MD   trazodone HCl (TRAZODONE PO) Take  by mouth.     Coleman Fournier MD loratadine (CLARITIN) 10 mg tablet Take 1 Tab by mouth daily. 18   Zakiya Johnson MD   aspirin 81 mg chewable tablet Take 81 mg by mouth daily. Coleman Fournier MD   levothyroxine (SYNTHROID) 75 mcg tablet Take 75 mcg by mouth Daily (before breakfast). Coleman Fournier MD       Allergies/Social/Family History:     No Known Allergies   Social History     Tobacco Use    Smoking status: Never Smoker    Smokeless tobacco: Never Used   Substance Use Topics    Alcohol use: No      History reviewed. No pertinent family history. Review of Systems:     A comprehensive review of systems was negative except for that written in the HPI. Objective:   Vital Signs:  Visit Vitals  /64   Pulse 61   Temp 97.9 °F (36.6 °C)   Resp 10   Ht 4' 5\" (1.346 m)   Wt 60.6 kg (133 lb 9.6 oz)   SpO2 100%   BMI 33.44 kg/m²    O2 Flow Rate (L/min): 2 l/min O2 Device: Nasal cannula Temp (24hrs), Av.4 °F (36.9 °C), Min:97.9 °F (36.6 °C), Max:99.4 °F (37.4 °C)           Intake/Output:     Intake/Output Summary (Last 24 hours) at 1/10/2020 0912  Last data filed at 1/10/2020 0700  Gross per 24 hour   Intake 1317.92 ml   Output 1450 ml   Net -132.08 ml       Physical Exam:    General:  alert, no distress, appears stated age. Altered mental status. Eye:  negative  Heart:  regular rate and rhythm, S1, S2 normal, no murmur, click, rub or gallop  Abdomen:  soft, non-tender. Bowel sounds normal. No masses,  no organomegaly    LABS AND  DATA: Personally reviewed  Recent Labs     01/10/20  0328 20  0308   WBC 4.8 5.6   HGB 11.4* 13.1   HCT 35.7 39.4    287     Recent Labs     01/10/20  0328 20  0308    140   K 3.9 3.4*    103   CO2 33* 35*   BUN 12 6   CREA 0.59 0.56   * 141*   CA 8.1* 8.1*   MG 2.0 1.9   PHOS 3.2 2.9     No results for input(s): SGOT, GPT, AP, TBIL, TP, ALB, GLOB, AML, LPSE in the last 72 hours.     No lab exists for component: AMYP  No results for input(s): INR, PTP, APTT, INREXT, INREXT in the last 72 hours. No results for input(s): PHI, PCO2I, PO2I, FIO2I in the last 72 hours. No results for input(s): CPK, CKMB, TROIQ, BNPP in the last 72 hours. Hemodynamics:   PAP:   CO:     Wedge:   CI:     CVP:    SVR:       PVR:       Ventilator Settings:  Mode Rate Tidal Volume Pressure FiO2 PEEP                    Peak airway pressure:      Minute ventilation:          MEDS: Reviewed    Chest X-Ray:  CXR Results  (Last 48 hours)    None        DISPOSITION  Stay in ICU    CRITICAL CARE CONSULTANT NOTE  I had a face to face encounter with the patient, reviewed and interpreted patient data including clinical events, labs, images, vital signs, I/O's, and examined patient. I have discussed the case and the plan and management of the patient's care with the consulting services, the bedside nurses and the respiratory therapist.      NOTE OF PERSONAL INVOLVEMENT IN CARE   This patient has a high probability of imminent, clinically significant deterioration, which requires the highest level of preparedness to intervene urgently. I participated in the decision-making and personally managed or directed the management of the following life and organ supporting interventions that required my frequent assessment to treat or prevent imminent deterioration. I personally spent 45 minutes of critical care time. This is time spent at this critically ill patient's bedside actively involved in patient care as well as the coordination of care and discussions with the patient's family. This does not include any procedural time which has been billed separately.     Krystina Rose MD  [facility]  1/10/2020

## 2020-01-10 NOTE — PROGRESS NOTES
1930: Bedside and Verbal shift change [facility] (oncoming [facility]) by Lucretia Gonzalez (offgoing nurse). Report included the following information SBAR, Kardex, ED Summary, Procedure Summary, MAR, and Cardiac Rhythm sinus tach . 2030: Pt very restless and attempting to get out of bed despite restraints. Family at bedside trying to calm pt. Notified charge RN Cassie and requested for sitter. Vtach noted. Notified Dr. Tika Anderson regarding pt's agitation and vtach. Order received for precedex. Per Dr. Tika Anderson, pt had low potassium and was given repletion, no orders for EKG at this time. 0300: Notified Dr. Tika Anderson of low SBP in 70s with MAP in 50s. Order received for 1L NS bolus.

## 2020-01-10 NOTE — PROGRESS NOTES
Transitions of care    1.) Home with home health and family support, patient has caregivers 8 hours a day 6 days a week. 2) Short term rehab    Patient remains in the ICU, 24 hour EEG is completed. Patient is awake, alert, restless, not following commands. PT/OT continue to work with patient. Care management continuing to follow for transitions of care.  Olga Norman RN,CRM

## 2020-01-10 NOTE — PROGRESS NOTES
[facility]    ICU TEAM Progress Note    Name: Kimberly Santana   : 1955   MRN: [id number]   Date: 2020      Assessment:     ICU Problems:    1. S/p Craniotomy for SDH - 2020  2. Chronic SDH  3. Encephalopathy  4. Seizures - facial twitching  5. Down Syndrome    ICU Comprehensive Plan of Care:     Plans for this Shift:     1. 24 hour EEG ordered  2. Continue Vimpat and Keppra for now  3. Appreciate Neuro recs - possible de-escalation if 24 hour EEG is normal  4. Continue TF's  5. Continue PT/OT        Subjective:   Progress Note: 2020      Reason for ICU Admission: Encephalopathy     Overnight Events: None    POD:7 Days Post-Op    S/P: Procedure(s):  CRANIOTOMY LEFT FRONTAL FORSUBDURAL HEMATOMA    Active Problem List:     Problem List  Date Reviewed: 2020          Codes Class    [facility] ICD-10-CM: R56.9  ICD-9-CM: 780.39         * (Principal) SDH (subdural hematoma) ([facility] [address] ICD-10-CM: G51.1A5P  ICD-9-CM: 432.1         Down's syndrome (Chronic) ICD-10-CM: Q90.9  ICD-9-CM: 758.0         Acquired hypothyroidism (Chronic) ICD-10-CM: E03.9  ICD-9-CM: 244.9               Past Medical History:      has a past medical history of Endocrine disease and Psychiatric disorder. Past Surgical History:      has a past surgical history that includes hx cholecystectomy. Home Medications:     Prior to Admission medications    Medication Sig Start Date End Date Taking? Authorizing Provider   calcium-cholecalciferol, d3, (CALCIUM 600 + D) 600-125 mg-unit tab Take  by mouth. Coleman Fournier MD   QUEtiapine (SEROQUEL) 25 mg tablet Take 25 mg by mouth two (2) times a day. Coleman Fournier MD   trazodone HCl (TRAZODONE PO) Take  by mouth. Coleman Fournier MD   loratadine (CLARITIN) 10 mg tablet Take 1 Tab by mouth daily. 18   Caridad Kumar MD   aspirin 81 mg chewable tablet Take 81 mg by mouth daily.     Coleman Fournier MD   levothyroxine (SYNTHROID) 75 mcg tablet Take 75 mcg by mouth Daily (before breakfast). Coleman Fournier MD       Allergies/Social/Family History:     No Known Allergies   Social History     Tobacco Use    Smoking status: Never Smoker    Smokeless tobacco: Never Used   Substance Use Topics    Alcohol use: No      History reviewed. No pertinent family history. Review of Systems:     A comprehensive review of systems was negative except for that written in the HPI. Objective:   Vital Signs:  Visit Vitals  /70 (BP 1 Location: Left arm, BP Patient Position: At rest)   Pulse 88   Temp 97.9 °F (36.6 °C)   Resp 12   Ht 4' 5\" (1.346 m)   Wt 62 kg (136 lb 11 oz)   SpO2 98%   BMI 34.21 kg/m²    O2 Flow Rate (L/min): 2 l/min O2 Device: Nasal cannula Temp (24hrs), Av.7 °F (37.1 °C), Min:97.9 °F (36.6 °C), Max:100 °F (37.8 °C)           Intake/Output:     Intake/Output Summary (Last 24 hours) at 2020  Last data filed at 2020 1600  Gross per 24 hour   Intake 1810 ml   Output 1250 ml   Net 560 ml       Physical Exam:    General:  alert, no distress, appears stated age  Eye:  negative  Heart:  regular rate and rhythm, S1, S2 normal, no murmur, click, rub or gallop  Abdomen:  soft, non-tender. Bowel sounds normal. No masses,  no organomegaly    LABS AND  DATA: Personally reviewed  Recent Labs     20  0308 20  0351   WBC 5.6 6.9   HGB 13.1 13.2   HCT 39.4 39.3    276     Recent Labs     20  0308 20  0351    141   K 3.4* 3.4*    104   CO2 35* 33*   BUN 6 8   CREA 0.56 0.59   * 140*   CA 8.1* 8.1*   MG 1.9 2.0   PHOS 2.9 1.9*     No results for input(s): SGOT, GPT, AP, TBIL, TP, ALB, GLOB, AML, LPSE in the last 72 hours. No lab exists for component: AMYP  No results for input(s): INR, PTP, APTT, INREXT in the last 72 hours. No results for input(s): PHI, PCO2I, PO2I, FIO2I in the last 72 hours. No results for input(s): CPK, CKMB, TROIQ, BNPP in the last 72 hours.     Hemodynamics:   PAP:   CO:     Wedge:   CI:     CVP:    SVR: PVR:       Ventilator Settings:  Mode Rate Tidal Volume Pressure FiO2 PEEP                    Peak airway pressure:      Minute ventilation:          MEDS: Reviewed    Chest X-Ray:  CXR Results  (Last 48 hours)    None              Multidisciplinary Rounds Completed: Yes    ABCDEF Bundle/Checklist  Pain Medications: Acetaminophen  Target RASS: N/A  Sedation Medications: None  CAM-ICU:  Negative  Mobility: Poor  PT/OT: PT consulted and on board, OT consulted and on board and Speech therapy consulted and on board   Restraints: Soft wrist restraints  Discussed Plan of Care (goals of care): Yes  Addressed Code Status: Full Code    CARDIOVASCULAR  Cardiac Gtts: None  SBP Goal of: > 90 mmHg  MAP Goal of: > 65 mmHg  Transfusion Trigger (Hgb): <7 g/dL    RESPIRATORY  Vent Goals:   Head of bed > 30 degrees  DVT Prophylaxis (if no, list reason): SCD's or Sequential Compression Device   SPO2 Goal: > 92%  Pulmonary toilet: Duo-Nebs     GI/  [place] Catheter Present: Other  GI Prophylaxis: Pepcid (famotidine)   Nutrition: Yes TF  Bowel Movement: Yes  Bowel Regimen: Senna and Docusate (Colace)    ANTIBIOTICS  Antibiotics:  None    T/L/D  Tubes: None  Lines: Peripheral IV  Drains: Pure Wick    SPECIAL EQUIPMENT  None    DISPOSITION  Stay in ICU    CRITICAL CARE CONSULTANT NOTE  I had a face to face encounter with the patient, reviewed and interpreted patient data including clinical events, labs, images, vital signs, I/O's, and examined patient. I have discussed the case and the plan and management of the patient's care with the consulting services, the bedside nurses and the respiratory therapist.      NOTE OF PERSONAL INVOLVEMENT IN CARE   This patient has a high probability of imminent, clinically significant deterioration, which requires the highest level of preparedness to intervene urgently.  I participated in the decision-making and personally managed or directed the management of the following life and organ supporting interventions that required my frequent assessment to treat or prevent imminent deterioration. I personally spent 45 minutes of critical care time. This is time spent at this critically ill patient's bedside actively involved in patient care as well as the coordination of care and discussions with the patient's family. This does not include any procedural time which has been billed separately.     Javier Springer DO, MS  Staff Intensivist/Anesthesiologist  [facility]  1/9/2020

## 2020-01-10 NOTE — PROCEDURES
[address]  EEG    Name:  Hema Fabian  MR#:  [id number]  :  1955  ACCOUNT #:  [de-identified]  DATE OF SERVICE:  2020      REQUESTING PHYSICIAN:  Kiel Mckeon DO    HISTORY:  The patient is a 70-year-old female who is being evaluated for altered mental status. Previous EEG showed encephalopathy, but it was limited study. MEDICATIONS:  Keppra. DESCRIPTION:  This is an 18-channel EEG with video monitoring performed on 2020. The recording starts at 09:38 a.m. and continues until 01:44 p.m. There is no clear dominant background rhythm. Background activity consists of medium voltage rhythms ranging from 6-7 Hz with intermittent generalized delta slowing. At times, the slow waveforms were noted to be of triphasic configuration. Drowsiness and sleep architecture was not seen. No clinical seizure activity was noted on the video. The patient pulled off electrodes around 12:33 p.m. after which the record was not readable. EEG SUMMARY:  Abnormal EEG due to moderate slowing of the background rhythms with slow waveforms consisting of triphasic configuration. CLINICAL INTERPRETATION:  This EEG is suggestive of moderate generalized encephalopathic process, nonspecific in type. This type of an EEG pattern most likely suggests an underlying toxic/metabolic cause. No clearly lateralizing or epileptiform features were noted. No seizure was recorded.         Urban Kennedy MD      [id number]  D:  2020 16:27  T:  2020 23:58  JOB #:  [id number]

## 2020-01-10 NOTE — PROGRESS NOTES
Neurology Progress Note  Wendie Myers NP  Neurocritical Care Nurse Practitioner  [phone]    Admit Date: 1/2/2020   LOS: 8 days      Daily Progress Note: 1/10/2020    POD: 7 Days Post-Op    S/P: Procedure(s):  CRANIOTOMY LEFT FRONTAL FOR SUBDURAL HEMATOMA    HPI:  71 y.o. female with Down's syndrome, hypothyroidism, and chronic SDH was admitted with encephalopathy. She was found to have bilateral chronic subdural hematomas, left larger than right, with rightward midline shift and descending transtentorial herniation upon admission. She is  now s/p evacuation of L SDH on 1/2/20. Neurology was consulted due to reported focal (R facial twitching) as well as questionable generalized seizure activity of the  b/l UE and face noted on 1/4. CT head on 1/4 with L SDH evacuation and postoperative findings. 01/07 CT showed slight interval increase in left subdural hematoma status post removal of drainage catheter, with slight increase of midline shift to the right. CT head repeated 01/08 showed improved R frontal SDH and MLS. EEG 1/4 with moderate to severe encephalopathy, intermittent sharply contoured waves without electrographic seizure activity. 24h EEG, completed 1/6 a.m., was very limited due to dislodgement of several electrodes  but readable portions do not convincingly show seizure activity. The background continues to indicate generalized encephalopathic process. EEG on 01/09 from 9:38 am to 1:44 pm shows moderate generalized encephalopathic process, nonspecific, suggesting underlying toxic/metabolic cause. No seizure recorded during that time. Subjective:   Patient awake and alert, restless. She is pulling at lines and trying to get out of the bed. Precedex was ordered by the Intensivist to help with her agitation. Family at bedside last night. No Known Allergies    Review of Systems:  Pertinent items are noted in the History of Present Illness.     Objective:   Vital signs  Temp (24hrs), Av.6 °F (37 °C), Min:97.9 °F (36.6 °C), Max:99.4 °F (37.4 °C)   1901 - 01/10 0700  In: 352.9 [I.V.:12.9]  Out: 0    07 - 1900  In: 6643 [I.V.:100]  Out: 0 [Urine:1900]  Visit Vitals  /65 (BP 1 Location: Left arm, BP Patient Position: At rest)   Pulse 83   Temp 98.8 °F (37.1 °C)   Resp 16   Ht 4' 5\" (1.346 m)   Wt 62 kg (136 lb 11 oz)   SpO2 98%   BMI 34.21 kg/m²    O2 Flow Rate (L/min): 2 l/min O2 Device: Nasal cannula   Vitals:    20 2100 20 2200 20 2300 01/10/20 0000   BP: 108/58 121/62 102/60 113/65   Pulse: 92 89 87 83   Resp: 15 17 19 16   Temp:    98.8 °F (37.1 °C)   SpO2: 96% 91% 92% 98%   Weight:       Height:            Physical Exam:  GENERAL: Restless, NAD  Cardiac: NSR, RRR  Lungs: CTA, unlabored breathing  Abdomen: soft/NT/not distended, BS present  SKIN: Warm, dry, color appropriate for ethnicity. Left scalp incision C/D/I with staples in place. Neurologic Exam:  Restless in bed. Awake and alert. Family at bedside. Patient did say \"hi\" to me after being encouraged by her brother. Still generally not following commands. PERRL. Right eye esotropic at baseline. Left eye with midline gaze. Blink to threat bilaterally. Spontaneous movements x 4 extremities. Withdraws from painful stimulus x 4 extremities. Mute Babinski's bilaterally.    .    Labs:  Lab Results   Component Value Date/Time    WBC 5.6 2020 03:08 AM    HGB 13.1 2020 03:08 AM    HCT 39.4 2020 03:08 AM    PLATELET 249  03:08 AM    MCV 94.5 2020 03:08 AM     Lab Results   Component Value Date/Time    Sodium 140 2020 03:08 AM    Potassium 3.4 (L) 2020 03:08 AM    Chloride 103 2020 03:08 AM    CO2 35 (H) 2020 03:08 AM    Anion gap 2 (L) 2020 03:08 AM    Glucose 141 (H) 2020 03:08 AM    BUN 6 2020 03:08 AM    Creatinine 0.56 2020 03:08 AM    BUN/Creatinine ratio 11 (L) 2020 03:08 AM    GFR est AA >60 01/09/2020 03:08 AM    GFR est non-AA >60 01/09/2020 03:08 AM    Calcium 8.1 (L) 01/09/2020 03:08 AM       Imaging:    CT Results (maximum last 3): Results from [place] encounter on 01/02/20   CT HEAD WO CONT    Narrative EXAM: CT HEAD WO CONT    INDICATION: AMS, L SDH    COMPARISON: None. CONTRAST: None. TECHNIQUE: Unenhanced CT of the head was performed using 5 mm images. Brain and  bone windows were generated. CT dose reduction was achieved through use of a  standardized protocol tailored for this examination and automatic exposure  control for dose modulation. FINDINGS:  There is persistent shift of the midline to the right by 2.5 mm, previously 3.1  mm. There is mild hydrocephalus which is stable. There is no significant white  matter disease. The fluid collection adjacent to the right frontal lobe measures  10.8 mm and previously measured 11.7 mm. The mixed subdural hematoma adjacent to  left frontal lobe measures 13.1 mm and previously measured 12.9 mm. The mixed  subdural hematoma adjacent to the left parietal lobe measures 12.2 mm and  previously measured 12.0 mm. Hematoma adjacent to left frontal lobe measures 9.5  mm and previously measured 9.3 mm. The basilar cisterns are open. No acute  infarct is identified. The bone windows demonstrate craniectomy. The visualized  portions of the paranasal sinuses and mastoid air cells are clear. Impression IMPRESSION: Interval slight improvement in midline shift. CT HEAD WO CONT    Narrative EXAM: CT HEAD WO CONT    INDICATION: assess bleed    COMPARISON: January 4. CONTRAST: None. TECHNIQUE: Unenhanced CT of the head was performed using 5 mm images. Brain and  bone windows were generated. CT dose reduction was achieved through use of a  standardized protocol tailored for this examination and automatic exposure  control for dose modulation.       FINDINGS:  There is slight shift of the midline to the right of 3.1 mm, previously 2.0 mm. There is no significant white matter disease. The left subdural hematoma  measures 14.7 mm, previously 13.4 mm adjacent to left frontal lobe and 9.3 mm  adjacent to the left parietal lobe, previously 8.3 mm. The fluid collection  adjacent to the right frontal lobe measures 11.7 mm, previously 11.4 mm. The  drainage catheter has been removed in the interval. The basilar cisterns are  open. No acute infarct is identified. The bone windows demonstrate craniotomy. There is fluid in the left ethmoid air cells and mucosal thickening of the  maxillary sinus. Impression IMPRESSION: Slight interval increase in left subdural hematoma status post  removal of drainage catheter, with slight increase of midline shift to the  right. CT HEAD WO CONT    Narrative EXAM:  CT HEAD WO CONT    INDICATION:   post op craniotomy    COMPARISON: CT head 1/30/2020. TECHNIQUE: Unenhanced CT of the head was performed using 5 mm images. Brain and  bone windows were generated. CT dose reduction was achieved through use of a  standardized protocol tailored for this examination and automatic exposure  control for dose modulation. FINDINGS:  Stable postsurgical changes of left frontal craniotomy and chronic subdural  hematoma evacuation, with a left-sided subdural drain in place. No significant  change in size of mixed density left cerebral convexity subdural hematoma with  acute hyperdense components, measuring up to 11 mm in maximal thickness. Scattered left cerebral pneumocephalus is unchanged. There is unchanged minimal  2 mm of rightward midline shift. There is an unchanged chronic right subdural  hematoma measuring up to 11 mm in maximal thickness. No acute hyperdense  component is identified within this subdural hematoma. The ventricles are normal in size and position. Basilar cisterns are patent. No  evidence of acute infarct. The paranasal sinuses, mastoid air cells, and middle  ears are clear. Unchanged medially deviated right globe. Impression IMPRESSION:  1. Expected postoperative changes of left subdural hematoma evacuation as above,  with no significant change in size of mixed density left subdural hematoma with  acute hyperdense components. Stable chronic right subdural hematoma. Stable  minimal 2 mm of rightward midline shift. Assessment:   Principal Problem:    SDH (subdural hematoma) (HCC) (1/2/2020)    Active Problems:    Down's syndrome (1/2/2020)      Acquired hypothyroidism (1/2/2020)      Seizures ([address] (1/6/2020)      Plan:     Seizures              - Continue Keppra 1500 mg bid and Vimpat 100 mg bid              - Continue to avoid overuse of sedatives/Ativan for non-epileptic tremors.    -Continue q 2 h Neuro checks. - 24 h EEG currently on patient due to persistent encephalopathy to exclude subclinical  Seizures. - Plan is to de-escalate her AEDs if no evidence of subclinical seizure activity. Chronic subdural hematomas of mixed chronicity   -s/p crani on 01/02   -followed by Neurosurgery        Plan discussed with , the patient's brother and the primary RN. Further recommendations to follow from Dr. Rod Scanlon. Angel Ornelas NP  Neurocritical Care Nurse Practitioner    Angel Ornelas NP  01/10/20    Staff Addendum:  Antonio Yarbrough reviewed the documentation provided by the nurse practitioner, discussed her findings, clinical impression, and the proposed management plans with regards to this encounter. Amirah Fritz have personally evaluated the patient and verified the history and confirmed the physical findings.  Below are my additional findings:  71year old female with Down's syndrome, chronic SDH admitted with encephalopathy, now s/p evacuation of L SDH 1/3/20.   Neurology consulted due to reported focal (R facial twitching) as well as questionable generalized seizure activity b/l UE and face noted 1/4.  EEG 1/4 with moderate to severe encephalopathy, intermittent sharply contoured waves without electrographic seizure activity.  24h EEG revealed diffuse slowing without electrographic seizure activity, rare L frontal SWD. No further clinical seizure activity reported.  CT head repeated 1/8 showing improved R frontal SDH and MLS. She exhibited agitation overnight with initiation of Precedex which subsequently resulted in hypotension and bradycardia. She is now off of sedation.  Examination is somewhat improved today- arouses to voice, no command following, PERRL, OD medial deviation, no blink to threat, W/D x 4, toes mute b/l.    24h EEG read without subclinical seizure activity. LEV decreased to 1g BID 1/9. Will wean off of Vimpat and see if this improves encephalopathy.       Fatoujavan Morocho, DO  01/10/20

## 2020-01-10 NOTE — PROGRESS NOTES
Physical Therapy Note  1.10.2020    Chart reviewed in prep for PT tx session. Attempted to see however currently undergoing bedside procedure. Will defer and follow up later this PM as able/appropriate.     Thank you,  Yamileth Arenas, PT, DPT

## 2020-01-11 NOTE — PROGRESS NOTES
1930:Bedside and Verbal shift change report given to Benitez Nicole RN (oncoming nurse) by Arash Mireles RN (offgoing nurse). Report included the following information SBAR, Kardex, Intake/Output, MAR, Accordion, Recent Results, Med Rec Status, Cardiac Rhythm NSR and Dual Neuro Assessment.

## 2020-01-11 NOTE — PROGRESS NOTES
[facility]    ICU TEAM Progress Note    Name: Danay Hanson   : 1955   MRN: [id number]   Date: 2020      Assessment:     ICU Problems:    1. S/p Craniotomy for SDH - 2020  2. Chronic SDH  3. Encephalopathy. Some improvement. 4. Seizures - facial twitching  5. Down Syndrome    ICU Comprehensive Plan of Care:     Plans for this Shift:     1. Weaning AED off of vimpat on Keppra for now  2. Appreciate Neuro recs. 3. Continue TF's  4. Continue PT/OT  5. Avoid precedex as it causes significant hypotension in her. 6. Closely monitor hemodynamics and serial neuro exams. 7. Continue care in ICU for now. Subjective:   Progress Note: 2020      Reason for ICU Admission: Encephalopathy     Overnight Events: Patient looks more comfortable today. She is awake but not following commands. Less agitated today. POD:8 Days Post-Op    S/P: Procedure(s):  CRANIOTOMY LEFT FRONTAL FORSUBDURAL HEMATOMA    Active Problem List:     Problem List  Date Reviewed: 2020          Codes Class    Seizures ([facility] [address] ICD-10-CM: R56.9  ICD-9-CM: 780.39         * (Principal) SDH (subdural hematoma) ([facility] [address] ICD-10-CM: K78.9E3H  ICD-9-CM: 432.1         Down's syndrome (Chronic) ICD-10-CM: Q90.9  ICD-9-CM: 758.0         Acquired hypothyroidism (Chronic) ICD-10-CM: E03.9  ICD-9-CM: 244.9               Past Medical History:      has a past medical history of Endocrine disease and Psychiatric disorder. Past Surgical History:      has a past surgical history that includes hx cholecystectomy. Home Medications:     Prior to Admission medications    Medication Sig Start Date End Date Taking? Authorizing Provider   calcium-cholecalciferol, d3, (CALCIUM 600 + D) 600-125 mg-unit tab Take  by mouth. Other, MD Coleman   QUEtiapine (SEROQUEL) 25 mg tablet Take 25 mg by mouth two (2) times a day. OtherColeman MD   trazodone HCl (TRAZODONE PO) Take  by mouth.     Coleman Fournier MD   loratadine (CLARITIN) 10 mg tablet Take 1 Tab by mouth daily. 18   Nola Donato MD   aspirin 81 mg chewable tablet Take 81 mg by mouth daily. Coleman Fournier MD   levothyroxine (SYNTHROID) 75 mcg tablet Take 75 mcg by mouth Daily (before breakfast). Coleman Fournier MD       Allergies/Social/Family History:     No Known Allergies   Social History     Tobacco Use    Smoking status: Never Smoker    Smokeless tobacco: Never Used   Substance Use Topics    Alcohol use: No      History reviewed. No pertinent family history. Review of Systems:     A comprehensive review of systems was negative except for that written in the HPI. Objective:   Vital Signs:  Visit Vitals  /53   Pulse 75   Temp 99.3 °F (37.4 °C)   Resp 13   Ht 4' 5\" (1.346 m)   Wt 59.4 kg (130 lb 15.3 oz)   SpO2 100%   BMI 32.78 kg/m²    O2 Flow Rate (L/min): 2 l/min O2 Device: Nasal cannula Temp (24hrs), Av.2 °F (36.8 °C), Min:97.6 °F (36.4 °C), Max:99.3 °F (37.4 °C)           Intake/Output:     Intake/Output Summary (Last 24 hours) at 2020 0939  Last data filed at 2020 0700  Gross per 24 hour   Intake 1535.52 ml   Output 1750 ml   Net -214.48 ml       Physical Exam:    General:  alert, no distress, appears stated age. Altered mental status. Eye:  negative  Heart:  regular rate and rhythm, S1, S2 normal, no murmur, click, rub or gallop  Abdomen:  soft, non-tender. Bowel sounds normal. No masses,  no organomegaly    LABS AND  DATA: Personally reviewed  Recent Labs     200 01/10/20  0328   WBC 3.7 4.8   HGB 11.9 11.4*   HCT 36.4 35.7    261     Recent Labs     200 01/10/20  0328    140   K 3.5 3.9    104   CO2 33* 33*   BUN 10 12   CREA 0.58 0.59   * 133*   CA 8.1* 8.1*   MG 2.3 2.0   PHOS 3.0 3.2     No results for input(s): SGOT, GPT, AP, TBIL, TP, ALB, GLOB, AML, LPSE in the last 72 hours. No lab exists for component: AMYP  No results for input(s): INR, PTP, APTT, INREXT, INREXT in the last 72 hours.    No results for input(s): PHI, PCO2I, PO2I, FIO2I in the last 72 hours. No results for input(s): CPK, CKMB, TROIQ, BNPP in the last 72 hours. Hemodynamics:   PAP:   CO:     Wedge:   CI:     CVP:    SVR:       PVR:       Ventilator Settings:  Mode Rate Tidal Volume Pressure FiO2 PEEP                    Peak airway pressure:      Minute ventilation:          MEDS: Reviewed    Chest X-Ray:  CXR Results  (Last 48 hours)    None        DISPOSITION  Stay in ICU    CRITICAL CARE CONSULTANT NOTE  I had a face to face encounter with the patient, reviewed and interpreted patient data including clinical events, labs, images, vital signs, I/O's, and examined patient. I have discussed the case and the plan and management of the patient's care with the consulting services, the bedside nurses and the respiratory therapist.      NOTE OF PERSONAL INVOLVEMENT IN CARE   This patient has a high probability of imminent, clinically significant deterioration, which requires the highest level of preparedness to intervene urgently. I participated in the decision-making and personally managed or directed the management of the following life and organ supporting interventions that required my frequent assessment to treat or prevent imminent deterioration. I personally spent 35 minutes of critical care time. This is time spent at this critically ill patient's bedside actively involved in patient care as well as the coordination of care and discussions with the patient's family. This does not include any procedural time which has been billed separately.     Mariaelena Sanchez MD  [facility]  1/11/2020

## 2020-01-11 NOTE — PROGRESS NOTES
Neurology Progress Note  Jovi Lacy NP  Neurocritical Care Nurse Practitioner  [phone]    Admit Date: 1/2/2020   LOS: 9 days      Daily Progress Note: 1/11/2020    POD: 8 Days Post-Op    S/P: Procedure(s):  CRANIOTOMY LEFT FRONTAL FOR SUBDURAL HEMATOMA    HPI:  71 y.o. female with Down's syndrome, hypothyroidism, and chronic SDH was admitted with encephalopathy. She was found to have bilateral chronic subdural hematomas, left larger than right, with rightward midline shift and descending transtentorial herniation upon admission. She is  now s/p evacuation of L SDH on 1/2/20. Neurology was consulted due to reported focal (R facial twitching) as well as questionable generalized seizure activity of the  b/l UE and face noted on 1/4. CT head on 1/4 with L SDH evacuation and postoperative findings. 01/07 CT showed slight interval increase in left subdural hematoma status post removal of drainage catheter, with slight increase of midline shift to the right. CT head repeated 01/08 showed improved R frontal SDH and MLS. EEG 1/4 with moderate to severe encephalopathy, intermittent sharply contoured waves without electrographic seizure activity. 24h EEG, completed 1/6 a.m., was very limited due to dislodgement of several electrodes  but readable portions do not convincingly show seizure activity. The background continues to indicate generalized encephalopathic process. EEG on 01/09 from 9:38 am to 1:44 pm shows moderate generalized encephalopathic process, nonspecific, suggesting underlying toxic/metabolic cause. No seizure recorded during that time. Subjective:   Patient awake and alert, restless. She is pulling at lines and trying to get out of the bed. Sitter at bedside. 24H EEG completed yesterday continues to show an underlying toxic/metabolic abnormality. No convincingly lateralizing or epileptiform features were noted. No electrographic or clinical seizures were noted.     No Known Allergies    Review of Systems:  Pertinent items are noted in the History of Present Illness. Objective:   Vital signs  Temp (24hrs), Av.9 °F (36.6 °C), Min:97.6 °F (36.4 °C), Max:98.7 °F (37.1 °C)   01/10 1901 - 700  In: 650   Out: 850 [Urine:850]  701 - 01/10 1900  In: 3228.8 [I.V.:1068.8]  Out: 2400 [Urine:2400]  Visit Vitals  /49   Pulse 81   Temp 98.7 °F (37.1 °C)   Resp 15   Ht 4' 5\" (1.346 m)   Wt 60.6 kg (133 lb 9.6 oz)   SpO2 93%   BMI 33.44 kg/m²    O2 Flow Rate (L/min): 2 l/min O2 Device: Nasal cannula   Vitals:    01/10/20 2300 20 0000 20 0034 20 0100   BP: 96/48 109/54  113/49   Pulse: 75 77  81   Resp: 15 17  15   Temp:  98.7 °F (37.1 °C)     SpO2: 98% 97% 96% 93%   Weight:       Height:            Physical Exam:  GENERAL: Restless, NAD  Cardiac: NSR, RRR  Lungs: CTA, unlabored breathing  Abdomen: soft/NT/not distended, BS present  SKIN: Warm, dry, color appropriate for ethnicity. Left scalp incision C/D/I with staples in place. Neurologic Exam:  Restless in bed. Awake and alert. Family at bedside. Patient again smiled and said hi. I asked her if she was feeling better and she said yes. Still generally not following commands. PERRL. Right eye esotropic at baseline. Left eye with midline gaze. Blink to threat bilaterally. Spontaneous movements x 4 extremities. Withdraws from painful stimulus x 4 extremities. Mute Babinski's bilaterally.    .    Labs:  Lab Results   Component Value Date/Time    WBC 4.8 01/10/2020 03:28 AM    HGB 11.4 (L) 01/10/2020 03:28 AM    HCT 35.7 01/10/2020 03:28 AM    PLATELET 922  03:28 AM    MCV 96.5 01/10/2020 03:28 AM     Lab Results   Component Value Date/Time    Sodium 140 01/10/2020 03:28 AM    Potassium 3.9 01/10/2020 03:28 AM    Chloride 104 01/10/2020 03:28 AM    CO2 33 (H) 01/10/2020 03:28 AM    Anion gap 3 (L) 01/10/2020 03:28 AM    Glucose 133 (H) 01/10/2020 03:28 AM    BUN 12 01/10/2020 03:28 AM Creatinine 0.59 01/10/2020 03:28 AM    BUN/Creatinine ratio 20 01/10/2020 03:28 AM    GFR est AA >60 01/10/2020 03:28 AM    GFR est non-AA >60 01/10/2020 03:28 AM    Calcium 8.1 (L) 01/10/2020 03:28 AM       Imaging:    CT Results (maximum last 3): Results from [facility] encounter on 01/02/20   CT HEAD WO CONT    Narrative EXAM: CT HEAD WO CONT    INDICATION: AMS, L SDH    COMPARISON: None. CONTRAST: None. TECHNIQUE: Unenhanced CT of the head was performed using 5 mm images. Brain and  bone windows were generated. CT dose reduction was achieved through use of a  standardized protocol tailored for this examination and automatic exposure  control for dose modulation. FINDINGS:  There is persistent shift of the midline to the right by 2.5 mm, previously 3.1  mm. There is mild hydrocephalus which is stable. There is no significant white  matter disease. The fluid collection adjacent to the right frontal lobe measures  10.8 mm and previously measured 11.7 mm. The mixed subdural hematoma adjacent to  left frontal lobe measures 13.1 mm and previously measured 12.9 mm. The mixed  subdural hematoma adjacent to the left parietal lobe measures 12.2 mm and  previously measured 12.0 mm. Hematoma adjacent to left frontal lobe measures 9.5  mm and previously measured 9.3 mm. The basilar cisterns are open. No acute  infarct is identified. The bone windows demonstrate craniectomy. The visualized  portions of the paranasal sinuses and mastoid air cells are clear. Impression IMPRESSION: Interval slight improvement in midline shift. CT HEAD WO CONT    Narrative EXAM: CT HEAD WO CONT    INDICATION: assess bleed    COMPARISON: January 4. CONTRAST: None. TECHNIQUE: Unenhanced CT of the head was performed using 5 mm images. Brain and  bone windows were generated.   CT dose reduction was achieved through use of a  standardized protocol tailored for this examination and automatic exposure  control for dose modulation. FINDINGS:  There is slight shift of the midline to the right of 3.1 mm, previously 2.0 mm. There is no significant white matter disease. The left subdural hematoma  measures 14.7 mm, previously 13.4 mm adjacent to left frontal lobe and 9.3 mm  adjacent to the left parietal lobe, previously 8.3 mm. The fluid collection  adjacent to the right frontal lobe measures 11.7 mm, previously 11.4 mm. The  drainage catheter has been removed in the interval. The basilar cisterns are  open. No acute infarct is identified. The bone windows demonstrate craniotomy. There is fluid in the left ethmoid air cells and mucosal thickening of the  maxillary sinus. Impression IMPRESSION: Slight interval increase in left subdural hematoma status post  removal of drainage catheter, with slight increase of midline shift to the  right. CT HEAD WO CONT    Narrative EXAM:  CT HEAD WO CONT    INDICATION:   post op craniotomy    COMPARISON: CT head 1/30/2020. TECHNIQUE: Unenhanced CT of the head was performed using 5 mm images. Brain and  bone windows were generated. CT dose reduction was achieved through use of a  standardized protocol tailored for this examination and automatic exposure  control for dose modulation. FINDINGS:  Stable postsurgical changes of left frontal craniotomy and chronic subdural  hematoma evacuation, with a left-sided subdural drain in place. No significant  change in size of mixed density left cerebral convexity subdural hematoma with  acute hyperdense components, measuring up to 11 mm in maximal thickness. Scattered left cerebral pneumocephalus is unchanged. There is unchanged minimal  2 mm of rightward midline shift. There is an unchanged chronic right subdural  hematoma measuring up to 11 mm in maximal thickness. No acute hyperdense  component is identified within this subdural hematoma. The ventricles are normal in size and position. Basilar cisterns are patent. No  evidence of acute infarct. The paranasal sinuses, mastoid air cells, and middle  ears are clear. Unchanged medially deviated right globe. Impression IMPRESSION:  1. Expected postoperative changes of left subdural hematoma evacuation as above,  with no significant change in size of mixed density left subdural hematoma with  acute hyperdense components. Stable chronic right subdural hematoma. Stable  minimal 2 mm of rightward midline shift. Assessment:   Principal Problem:    SDH (subdural hematoma) ([facility]) (1/2/2020)    Active Problems:    Down's syndrome (1/2/2020)      Acquired hypothyroidism (1/2/2020)      Seizures ([facility] [address] (1/6/2020)      Plan:     Seizures              - Continue to avoid overuse of sedatives/Ativan for non-epileptic tremors.    -Continue q 2 h Neuro checks. - 24 h EEG currently on patient due to persistent encephalopathy to exclude subclinical  Seizures. - Plan is to de-escalate her AEDs because of no subclinical seizure activity on latest EEG  completed 01/10. -LEV decreased to 1g BID 1/9. Weaned off of Vimpat to see if this improves  encephalopathy. Chronic subdural hematomas of mixed chronicity   -s/p crani on 01/02   -followed by Neurosurgery        Plan discussed with Jill Cat and the primary RN. Further recommendations to follow from Dr. Jill Cat. Patrick Primrose, NP  01/11/20    --------------------------------------------------------------------------------------    Neurology staff:    I examined the patient at the bedside. I agree with the documentation above as written by KEDAR Landaverde. Jackie Fritz is a new patient for me. In summary, she was admitted January 2 and underwent craniotomy for spontaneous subdural.  She developed seizures postop. Currently she is on Keppra after VIM was weaned out of concern for sedation. EEG yesterday without seizures but did show some slowing. Per nursing she is back to her baseline mostly now. No seizures.   On exam she is very drowsy but does wake up appropriately. She does not follow any commands. Plan is to continue the current anticonvulsant. Seizures seem to be under control. We will follow peripherally. Please call us if needed.     [address], DO  Neurologist  Diplomate, American Board of Psychiatry and Neurology  Board Certified, Adult Neurology and Brain Injury Medicine

## 2020-01-11 NOTE — PROGRESS NOTES
Neurosurgery  POD 9    She is more awake today,     Eyes open says hi  Moves all extremities    Wound is clean and dry        She is still requiring a sitter/ restraints for impulsiveness and getting out of bed.     I think she could go to step down with a sitter/near the front

## 2020-01-11 NOTE — PROCEDURES
[address]  EEG    Name:  Caroline Saravia  MR#:  [id number]  :  1955  ACCOUNT #:  [de-identified]  DATE OF SERVICE:  01/10/2020      REQUESTING PHYSICIAN:  Dr. Sola Norman. HISTORY:  The patient is a 26-year-old female who is being evaluated for altered mental status and to rule out nonconvulsive seizure activity. DESCRIPTION:  This is an 18-channel EEG with video monitoring performed on 2020. The recording starts at 02:14 p.m. and continues until 08:14 a.m. on 01/10/2020. The dominant posterior background rhythm consists of symmetric, fairly well-modulated medium voltage rhythms in the 7-8 Hz frequency range out of the posterior head region. Intermittently periods of frontally dominant rhythmic delta slowing were seen. Occasionally slow wave forms were noted to be of triphasic configuration. Drowsiness and sleep states were characterized by attenuation of the background amplitudes and frequencies. No clinical seizure-like activity was noted on the video. EEG SUMMARY:  Abnormal EEG due to mild-to-moderate slowing of the background rhythms    CLINICAL INTERPRETATION:  This EEG is suggestive of a mild-to-moderate generalized encephalopathic process, nonspecific in type. This most likely represents an underlying toxic/metabolic abnormality. No convincingly lateralizing or epileptiform features were noted. No electrographic or clinical seizures were noted during this extended video monitoring.       Red Blanco MD      [id number]  D:  01/10/2020 14:41  T:  01/10/2020 21:56  JOB #:  [id number]

## 2020-01-12 NOTE — PROGRESS NOTES
1930:Bedside and Verbal shift change report given to Arjun Yao RN (oncoming nurse) by Alberto Lindsay RN (offgoing nurse). Report included the following information SBAR, Kardex, Intake/Output, MAR, Accordion, Recent Results, Med Rec Status and Cardiac Rhythm NSR.     0000: Patient extremely restless in the bed, sitting up and attempting to pull dobhoff. Dr. Nolberto Spaulding notified new order for one time Seroquel 25mg to be given.

## 2020-01-12 NOTE — PROGRESS NOTES
0730: Bedside shift change report given to Stevie Smith RN (oncoming nurse) by Kimberly Flores RN (offgoing nurse). Report included the following information SBAR, Kardex, Intake/Output, MAR, Recent Results, Med Rec Status, Cardiac Rhythm NSR, Alarm Parameters  and Dual Neuro Assessment. 0830: ICU multidisciplinary rounds lead by Dr. Darnelle Paget (Intensivist): The following were reviewed and discussed: current labs,  recent imaging, lines/drains, review of body systems, nutrition, cultures, mobility, length of ICU stay. The plan of care for the day is as follows: transfer orders placed,(written note by RN)     1250: Pt's blood pressure low, Dr. Darnelle Paget aware and fluid bolus ordered. 1550: Pt requiring assist x3 for hygiene/loki-care, yelling, thrashing. Initially pulled PCT from floor to sit with patient; however, family arrived and patient calmed with brother and JOSEP at the bedside. 1900: Pt has been much calmer with family at the bedside, easily agitated with care, difficult to redirect. CN aware that pt needs sitter for transfer. 1915: Family has gone home for the night. Would like to discuss discharge planning, and need for new PCP for patient with care manager on Monday. 1930: Bedside shift change report given to Filippo March RN (oncoming nurse) by Stevie Smith RN (offgoing nurse). Report included the following information SBAR, Kardex, Intake/Output, MAR, Cardiac Rhythm NSR, Alarm Parameters  and Dual Neuro Assessment.

## 2020-01-12 NOTE — PROGRESS NOTES
[facility]    ICU TEAM Progress Note    Name: Mikael Stephens   : 1955   MRN: [id number]   Date: 2020      Assessment:     ICU Problems:    1. S/p Craniotomy for SDH - 2020  2. Chronic SDH  3. Encephalopathy. Some improvement. 4. Seizures - facial twitching  5. Down Syndrome    ICU Comprehensive Plan of Care:     Plans for this Shift:     1. Weaning AED on Keppra for now  2. Appreciate Neuro and NS recs. 3. Continue TF's, speech to continue to follow for swallowing eval.  4. Continue PT/OT  5. Avoid precedex as it causes significant hypotension in her. 6. Closely monitor hemodynamics and serial neuro exams. 7. Agitation has been an issue, she is seroquel and trazodon, haldol PRN as well. She needs sitter on bedside. 8. Transfer out of ICU to Neuro unit with sitter on bedside. Subjective:   Progress Note: 2020      Reason for ICU Admission: Encephalopathy     Overnight Events: Patient looks more comfortable today. She is awake but not following commands. Still intermittently agitated but improving. POD: 9 Days Post-Op    S/P: Procedure(s):  CRANIOTOMY LEFT FRONTAL FORSUBDURAL HEMATOMA    Active Problem List:     Problem List  Date Reviewed: 2020          Codes Class    Seizures ([facility] [address] ICD-10-CM: R56.9  ICD-9-CM: 780.39         * (Principal) SDH (subdural hematoma) ([facility] [address] ICD-10-CM: M47.9A4C  ICD-9-CM: 432.1         Down's syndrome (Chronic) ICD-10-CM: Q90.9  ICD-9-CM: 758.0         Acquired hypothyroidism (Chronic) ICD-10-CM: E03.9  ICD-9-CM: 244.9               Past Medical History:      has a past medical history of Endocrine disease and Psychiatric disorder. Past Surgical History:      has a past surgical history that includes hx cholecystectomy. Home Medications:     Prior to Admission medications    Medication Sig Start Date End Date Taking? Authorizing Provider   calcium-cholecalciferol, d3, (CALCIUM 600 + D) 600-125 mg-unit tab Take  by mouth.     Other, MD Coleman   QUEtiapine (SEROQUEL) 25 mg tablet Take 25 mg by mouth two (2) times a day. Coleman Fournier MD   trazodone HCl (TRAZODONE PO) Take  by mouth. Coleman Fournier MD   loratadine (CLARITIN) 10 mg tablet Take 1 Tab by mouth daily. 18   Aniyah Armando MD   aspirin 81 mg chewable tablet Take 81 mg by mouth daily. Coleman Fournier MD   levothyroxine (SYNTHROID) 75 mcg tablet Take 75 mcg by mouth Daily (before breakfast). Coleman Fournier MD       Allergies/Social/Family History:     No Known Allergies   Social History     Tobacco Use    Smoking status: Never Smoker    Smokeless tobacco: Never Used   Substance Use Topics    Alcohol use: No      History reviewed. No pertinent family history. Review of Systems:     A comprehensive review of systems was negative except for that written in the HPI. Objective:   Vital Signs:  Visit Vitals  /65   Pulse 82   Temp 99.4 °F (37.4 °C)   Resp 13   Ht 4' 5\" (1.346 m)   Wt 58.6 kg (129 lb 3 oz)   SpO2 100%   BMI 32.34 kg/m²    O2 Flow Rate (L/min): 1 l/min O2 Device: Nasal cannula Temp (24hrs), Av.2 °F (37.3 °C), Min:98.5 °F (36.9 °C), Max:99.5 °F (37.5 °C)           Intake/Output:     Intake/Output Summary (Last 24 hours) at 2020 0851  Last data filed at 2020 0640  Gross per 24 hour   Intake 1250 ml   Output 550 ml   Net 700 ml       Physical Exam:    General:  alert, no distress, appears stated age. Altered mental status. Eye:  Negative  Resp. CTA, no distress. Heart:  regular rate and rhythm, S1, S2 normal, no murmur, click, rub or gallop  Abdomen:  soft, non-tender.  Bowel sounds normal. No masses,  no organomegaly    LABS AND  DATA: Personally reviewed  Recent Labs     20   WBC 5.3 3.7   HGB 12.6 11.9   HCT 39.6 36.4    283     Recent Labs     20    142   K 4.3 3.5    106   CO2 34* 33*   BUN 13 10   CREA 0.65 0.58   * 112*   CA 8.5 8.1*   MG 2.6* 2.3   PHOS 3.5 3.0     No results for input(s): SGOT, GPT, AP, TBIL, TP, ALB, GLOB, AML, LPSE in the last 72 hours. No lab exists for component: AMYP  No results for input(s): INR, PTP, APTT, INREXT, INREXT in the last 72 hours. No results for input(s): PHI, PCO2I, PO2I, FIO2I in the last 72 hours. No results for input(s): CPK, CKMB, TROIQ, BNPP in the last 72 hours. Hemodynamics:   PAP:   CO:     Wedge:   CI:     CVP:    SVR:       PVR:       Ventilator Settings:  Mode Rate Tidal Volume Pressure FiO2 PEEP                    Peak airway pressure:      Minute ventilation:          MEDS: Reviewed    Chest X-Ray:  CXR Results  (Last 48 hours)    None        DISPOSITION  Stay in ICU    CRITICAL CARE CONSULTANT NOTE  I had a face to face encounter with the patient, reviewed and interpreted patient data including clinical events, labs, images, vital signs, I/O's, and examined patient. I have discussed the case and the plan and management of the patient's care with the consulting services, the bedside nurses and the respiratory therapist.      NOTE OF PERSONAL INVOLVEMENT IN CARE   This patient has a high probability of imminent, clinically significant deterioration, which requires the highest level of preparedness to intervene urgently. I participated in the decision-making and personally managed or directed the management of the following life and organ supporting interventions that required my frequent assessment to treat or prevent imminent deterioration.         Wesley Carrizales MD  [facility]  1/12/2020

## 2020-01-12 NOTE — PROGRESS NOTES
.  [address] [facility] Adult  Hospitalist Group     ICU Transfer/Accept Summary   PATIENT ID: Js Cross  MRN: [id number]   YOB: 1955    PRIMARY CARE PROVIDER: Nirmal Pettit NP   DATE OF ADMISSION: 1/2/2020  7:17 PM    ATTENDING PHYSICIAN: Hyun Estrada MD  CONSULTATIONS:   IP CONSULT TO NEUROLOGY    PROCEDURES/SURGERIES:   Procedure(s):  CRANIOTOMY LEFT FRONTAL FORSUBDURAL HEMATOMA    REASON FOR ADMISSION: SDH (subdural hematoma) [facility]     HOSPITAL PROBLEM LIST:  Patient Active Problem List   Diagnosis Code    SDH (subdural hematoma) ([facility] [address] S06.5X9A    Down's syndrome Q90.9    Acquired hypothyroidism E03.9    Seizures ([facility] [address] R56.9       [place] [address] [place]  DATE OF TRANSFER: 1/12/2020       Brief HPI and Hospital Course:    41-year-old female with history of Down syndrome who was brought to ED from outside facility for encephalopathy, a CT scan was done showing subdural hematoma with midline shift. Patient had surgical removal of the hematoma on 1/2/2020 with craniotomy. Patient has been doing well, medically she has been improving, had some seizures likes symptom during the hospital stay but has been on antiepileptic drugs. Discussed with critical care team, there is an patient was kept in ICU was because of her behavior issues, was requiring medications to address her encephalopathy. Currently she is on her home medications of Seroquel and trazodone is doing better. She did have hypotension with Precedex and it should be avoided. ICU team also recommends that her mentation is better and the family is by the bedside and then sitter is by the bedside. We will continue sitter, transfer patient out of the ICU to hospital service for further care. Assessment and Plan:    #. Chronic subdural hematoma with midline shift status post craniotomy 1/2/2020   repeat CT scans reviewed, improving, weaning AED on Keppra for now for seizure prophylaxis.   Neurosurgery and neurology on board. PT/OT/speech as able, frequent neuro checks    #. Metabolic encephalopathy, acute on chronic due to delirium and subdural hematoma  Slow improvement, continue sitter, closely monitor hemodynamics  Continue Seroquel, trazodone and Haldol PRN    #.  Seizures reported as facial twitchingweaning AED, neurology on board, on Jeri Merrittfast  Had 24-hour EEG completed 1/10/2020 which continues to show underlying toxic metabolic abnormality    #. Down syndromesupportive care    #. Dysphagia, due to acute encephalopathy, Dobbhoff in placecontinue tube feeds, frequent orientation, SLP eval    #. Hypothyroidismcontinue Synthroid         PHYSICAL EXAMINATION:  Visit Vitals  /65   Pulse 82   Temp 99.4 °F (37.4 °C)   Resp 13   Ht 4' 5\" (1.346 m)   Wt 58.6 kg (129 lb 3 oz)   SpO2 100%   BMI 32.34 kg/m²     General appearance: fatigued, uncooperative, mild distress, appears older than stated age  Head: left side craniotomy wound/staples in place, no drainage/bleeding  Eyes: conjunctivae/corneas clear. Oral mucosa dry, Dobbhoff in place  Lungs: CTA BL, nW nR  Heart: RRR, NM  Abdomen: soft, NT, NG, NR  Neurologic: awake, confused, does not follow commands.      CODE STATUS:  x Full Code    DNR    Partial    Comfort Care     Signed:   Jaison Small MD  1/12/2020  9:09 AM

## 2020-01-12 NOTE — PROGRESS NOTES
Got seroquel and trazadone overnight for agitation/getting out of bed    Awakens, looks around  LOVE  Wound clean and dry    To step down with sitter

## 2020-01-13 NOTE — PROGRESS NOTES
Problem: Dysphagia (Adult)  Goal: *Acute Goals and Plan of Care (Insert Text)  Description  Speech Therapy Goals  Initiated 1/13/2020   1. Patient will participate in re-evaluation of swallow function within 7 days    Outcome: Not Progressing Towards Goal     SPEECH LANGUAGE PATHOLOGY BEDSIDE SWALLOW EVALUATION  Patient: Tip Warner [de-identified]71 y.o. female)  Date: 1/13/2020  Primary Diagnosis: SDH (subdural hematoma) (HCC) [S06.5X9A]  Procedure(s) (LRB):  CRANIOTOMY LEFT FRONTAL FORSUBDURAL HEMATOMA (Left) 11 Days Post-Op   Precautions: aspiration, fall       ASSESSMENT :  Based on the objective data described below, the patient presents with significant decline in swallow function since evaluation on admission now s/p prolonged NPO status and crani for SDH. Patient now alert, trying to pull dobbhoff tube, re-assessment of swallow function requested to assess for safety to resume PO intake. Patient demonstrates moderate oral and severe pharyngeal dysphagia. Oral phase overall characteristic of Down's with tongue thrusting and disorganized oral motor movements. Pharyngeal phase characterized by delayed swallow initiation and weak hyolaryngeal elevation/excursion via palpation. Immediate overt coughing noted with both purees and ice chips on 100% of trials followed by significantly wet vocal quality. This is strongly suggestive of aspiration. Patient is not safe for PO trials at this point. Agree with MD regarding discussion regarding goals of care in light of Down's syndrome, age, dementia, new SDH s/p crani, and decline in function this admission with high risk for aspiration and need for restraints to prevent pulling bridled NG tube. Patient will benefit from skilled intervention to address the above impairments. Patients rehabilitation potential is considered to be Poor      PLAN :  Recommendations and Planned Interventions:  --SLP to follow for re-assessment of swallow function.     --agree with Palliative Care consult for discussions regarding goals of care based on discussion bedside with MD and family. Frequency/Duration: Patient will be followed by speech-language pathology 3 times a week to address goals. Discharge Recommendations: To Be Determined     SUBJECTIVE:   Patient alert, constantly moving. Family and MD present bedside and discussing medical status. Patient just returning to bed with PT/OT. OBJECTIVE:     Past Medical History:   Diagnosis Date    Endocrine disease     hypothyroidism    Psychiatric disorder     Down Syndrome     Past Surgical History:   Procedure Laterality Date    HX CHOLECYSTECTOMY       Prior Level of Function/Home Situation:   Home Situation  Home Environment: Private residence  # Steps to Enter: 1  One/Two Story Residence: One story  Living Alone: No  Support Systems: Family member(s)  Patient Expects to be Discharged to[de-identified] Private residence  Current DME Used/Available at Home: Grab bars, Shower chair, Walker, rolling  Tub or Shower Type: Tub/Shower combination  Diet prior to admission: mech soft  Current Diet:  NPO with NG    Cognitive and Communication Status:  Neurologic State: Alert, Eyes open spontaneously  Orientation Level: Unable to verbalize(nonverbal at baseline)  Cognition: No command following, Poor safety awareness  Perception: Appears intact  Perseveration: Perseverates during mobility(on keeping backside covered)  Safety/Judgement: Decreased awareness of environment, Decreased awareness of need for assistance, Decreased awareness of need for safety, Decreased insight into deficits, Fall prevention  Oral Assessment:     P.O. Trials:  Patient Position: upright in bed   Vocal quality prior to P.O.: No impairment;Wet(limited vocalizations)  Consistency Presented: Ice chips;Puree  How Presented: SLP-fed/presented;Spoon     Bolus Acceptance: No impairment  Bolus Formation/Control: Impaired  Type of Impairment: Delayed; Other (comment)(tongue thrusting, characteristic of Down's)  Propulsion: Delayed (# of seconds); Discoordination  Oral Residue: Less than 10% of bolus  Initiation of Swallow: Delayed (# of seconds)  Laryngeal Elevation: Decreased;Weak  Aspiration Signs/Symptoms: Change vocal quality;Strong cough(with 100% puree and ice chip trials )  Pharyngeal Phase Characteristics: Audible swallow;Double swallow; Suspected pharyngeal residue             Oral Phase Severity: Moderate  Pharyngeal Phase Severity : Severe    NOMS:   The NOMS functional outcome measure was used to quantify this patient's level of swallowing impairment. Based on the NOMS, the patient was determined to be at level 1 for swallow function       NOMS Swallowing Levels:  Level 1 (CN): NPO  Level 2 (CM): NPO but takes consistency in therapy  Level 3 (CL): Takes less than 50% of nutrition p.o. and continues with nonoral feedings; and/or safe with mod cues; and/or max diet restriction  Level 4 (CK): Safe swallow but needs mod cues; and/or mod diet restriction; and/or still requires some nonoral feeding/supplements  Level 5 (CJ): Safe swallow with min diet restriction; and/or needs min cues  Level 6 (CI): Independent with p.o.; rare cues; usually self cues; may need to avoid some foods or needs extra time  Level 7 ([address] [place] [address]): Independent for all p.o.  ABDI. (2003). National Outcomes Measurement System (NOMS): Adult Speech-Language Pathology User's Guide. Pain:  Pain Scale 1: Adult Nonverbal Pain Scale  Pain Intensity 1: 0       After treatment:   Patient left in no apparent distress in bed, Call bell within reach, Nursing notified, and Caregiver / family present    COMMUNICATION/EDUCATION:   Patient was educated regarding her deficit(s) of dysphagia as this relates to her diagnosis of SDH. She demonstrated Poor  understanding as evidenced by no response to education.     The patient's plan of care including recommendations, planned interventions, and recommended diet changes were discussed with: Registered Nurse and Physician. Patient/family have participated as able in goal setting and plan of care. Patient/family agree to work toward stated goals and plan of care. Thank you for this referral.  Sherman Bernabe M.CD.  CCC-SLP   Time Calculation: 20 mins

## 2020-01-13 NOTE — PROGRESS NOTES
Problem: Mobility Impaired (Adult and Pediatric)  Goal: *Acute Goals and Plan of Care (Insert Text)  Description  FUNCTIONAL STATUS PRIOR TO ADMISSION: Patient was independent for gait without use of DME.    HOME SUPPORT PRIOR TO ADMISSION: The patient lived with her son and daughter in-law. Physical Therapy Goals  Weekly reassessment completed 01.13.2020; Goals downgraded. 1.  Patient will move from supine to sit and sit to supine  and scoot up and down in bed with supervision/set-up within 7 day(s). 2.  Patient will transfer from bed to chair and chair to bed with minimal assistance/contact guard assist using the least restrictive device within 7 day(s). 3.  Patient will perform sit to stand with minimal assistance/contact guard assist within 7 day(s). 4.  Patient will ambulate with minimal assistance/contact guard assist for 50 feet with the least restrictive device within 7 day(s). 5.  Patient will ascend/descend 1 stairs with no handrail(s) with minimal assistance/contact guard assist within 7 day(s). Initiated 1/3/2020  1. Patient will move from supine to sit and sit to supine  in bed with modified independence within 7 day(s). 2.  Patient will transfer from bed to chair and chair to bed with modified independence using the least restrictive device within 7 day(s). 3.  Patient will perform sit to stand with modified independence within 7 day(s). 4.  Patient will ambulate with modified independence for 100 feet with the least restrictive device within 7 day(s). 5.  Patient will ascend/descend 1 step with no handrail(s) with modified independence within 7 day(s).       Outcome: Progressing Towards Goal    PHYSICAL THERAPY TREATMENT: WEEKLY REASSESSMENT  Patient: Abena Petersen (61 y.o. female)  Date: 1/13/2020  Primary Diagnosis: SDH (subdural hematoma) ([facility]) [S06.5X9A]  Procedure(s) (LRB):  CRANIOTOMY LEFT FRONTAL FORSUBDURAL HEMATOMA (Left) 11 Days Post-Op   Precautions: ASSESSMENT  Patient continues with skilled PT services and is marginally progressing towards goals however remains most limited by impaired cognition, safety, and command following, impulsivity, global weakness with R functionally weaker, impaired balance, and impaired gait leading to high falls risk and increased dependency from baseline level. Pt seen for weekly reassessment visit - now POD 11 from EWA younger however has had very limited participation with skilled PT 2* medical instability (seizures and medication related). Today, she is much more alert and very motivated to ambulate. Supportive family at beside. Overall, pt required up to mod A x2 for transfers and gait training without AD - difficulty with path navigation and turning. Demos R knee buckling with quick fatigue. Difficulty with specific motor commands/cues and does much better with familiar route tasks. Multiple family members, physician, and other team members present throughout session which contributed to a very chaotic and distracting environment - hard to redirect and assist back to bed, ultimately requiring max/total A for safety. Restraints reapplied and sitter at bedside at end of session. Ongoing discussion regarding discharge plans/options - family seems very adamant on discharge home. Would require further caregiver training, 24/7, and HHPT if the plan is for home - she is a very high falls risk. IF family can't provide level of care she requires, recommend SNF level rehab . Patient's progression toward goals since last assessment: limited 2* medical instability and inability to participate     Current Level of Function Impacting Discharge (mobility/balance): up to max A x2 for safety    Other factors to consider for discharge: Down's Syndrome at baseline however independent with mobility         PLAN :  Goals have been updated based on progression since last assessment.   Patient continues to benefit from skilled intervention to address the above impairments. Recommendations and Planned Interventions: bed mobility training, transfer training, gait training, therapeutic exercises, neuromuscular re-education, patient and family training/education, and therapeutic activities      Frequency/Duration: Patient will be followed by physical therapy:  5 times a week to address goals. Recommendation for discharge: (in order for the patient to meet his/her long term goals)  To be determined: Family seems adamant on discharge home in which case she would need 24/7 physical assist and supervision and HHPT at minimum. Very unsafe and very high falls risk. If family unable to provide this or is open to rehab, would recommend SNF. This discharge recommendation:  Has been made in collaboration with the attending provider and/or case management    IF patient discharges home will need the following DME: to be determined (TBD)         SUBJECTIVE:   Patient stated Chandu Later.     OBJECTIVE DATA SUMMARY:   HISTORY:    Past Medical History:   Diagnosis Date    Endocrine disease     hypothyroidism    Psychiatric disorder     Down Syndrome     Past Surgical History:   Procedure Laterality Date    HX CHOLECYSTECTOMY         Personal factors and/or comorbidities impacting plan of care: PMHx    Home Situation  Home Environment: Private residence  # Steps to Enter: 1  One/Two Story Residence: One story  Living Alone: No  Support Systems: Family member(s)  Patient Expects to be Discharged to[de-identified] Private residence  Current DME Used/Available at Home: Grab bars, Shower chair, Walker, rolling  Tub or Shower Type: Tub/Shower combination    EXAMINATION/PRESENTATION/DECISION MAKING:   Critical Behavior:  Neurologic State: Alert, Eyes open spontaneously  Orientation Level: Unable to verbalize(nonverbal at baseline)  Cognition: No command following, Poor safety awareness  Safety/Judgement: Decreased awareness of environment, Decreased awareness of need for assistance, Decreased awareness of need for safety, Decreased insight into deficits, Fall prevention  Hearing: Auditory  Auditory Impairment: None    Functional Mobility:  Bed Mobility:     Supine to Sit: Supervision  Sit to Supine: Maximum assistance  Scooting: Minimum assistance  Transfers:  Sit to Stand: Assist x2;Minimum assistance; Moderate assistance  Stand to Sit: Assist x2;Minimum assistance; Moderate assistance        Balance:   Sitting: Impaired  Sitting - Static: Fair (occasional)  Sitting - Dynamic: Fair (occasional)  Standing: Impaired; With support  Standing - Static: Fair;Constant support  Standing - Dynamic : Poor;Constant support  Ambulation/Gait Training:  Distance (ft): 20 Feet (ft)  Assistive Device: Gait belt(B HHA)  Ambulation - Level of Assistance: Assist x2; Moderate assistance  Gait Abnormalities: Altered arm swing;Decreased step clearance;Shuffling gait;Trunk sway increased  Base of Support: Widened;Center of gravity altered  Speed/Sharmila: Shuffled; Slow         Activity Tolerance:   Fair  Please refer to the flowsheet for vital signs taken during this treatment. After treatment patient left in no apparent distress:   Supine in bed, Call bell within reach, Bed / chair alarm activated, Caregiver / family present, Side rails x 3, and Restraints    COMMUNICATION/EDUCATION:   The patients plan of care was discussed with: Occupational Therapist, Speech Therapist, Registered Nurse, and Physician. Fall prevention education was provided and the patient/caregiver indicated understanding., Patient/family have participated as able in goal setting and plan of care. , and Patient/family agree to work toward stated goals and plan of care.     Thank you for this referral.  Jeanie Ndiaye, PT, DPT   Time Calculation: 27 mins

## 2020-01-13 NOTE — PROGRESS NOTES
Problem: Self Care Deficits Care Plan (Adult)  Goal: *Acute Goals and Plan of Care (Insert Text)  Description    FUNCTIONAL STATUS PRIOR TO ADMISSION: patient lives with her brother and sister in law and requires up to max A for ADLs at baseline - brother reports he will hand patient her clothing and she will andrew, except shoes and socks. Patient mostly able to bathe herself with verbal cues and supervision and cant self feed with min A. Brother reports patient able to complete bladder hygiene with setup, however, requires up to max A for bowel hygiene. HOME SUPPORT: The patient lived with brother and JOSEP and requires up to max A for ADLs. Occupational Therapy Goals  Weekly Re-Assessment 1/3/20, all goals continued  Initiated 1/3/2020  1. Patient will perform grooming with minimal assistance/contact guard assist within 7 day(s). 2.  Patient will perform lower body dressing with minimal assistance/contact guard assist within 7 day(s). 3.  Patient will perform bathing with minimal assistance/contact guard assist within 7 day(s). 4.  Patient will perform toilet transfers with supervision/set-up within 7 day(s). 5.  Patient will perform all aspects of toileting with minimal assistance/contact guard assist within 7 day(s). 6.  Patient will participate in upper extremity therapeutic exercise/activities with supervision/set-up for 5 minutes within 7 day(s). 7.  Patient will utilize energy conservation techniques during functional activities with verbal and tactile cues within 7 day(s).             Outcome: Not Met     OCCUPATIONAL THERAPY TREATMENT/WEEKLY RE-ASSESSMENT  Patient: Brandon Quiñones (87 y.o. female)  Date: 1/13/2020  Diagnosis: SDH (subdural hematoma) (Formerly [facility]) [S06.5X9A]   SDH (subdural hematoma) (Formerly [facility])  Procedure(s) (LRB):  CRANIOTOMY LEFT FRONTAL FORSUBDURAL HEMATOMA (Left) 11 Days Post-Op  Precautions:    Chart, occupational therapy assessment, plan of care, and goals were reviewed. ASSESSMENT  Patient continues with skilled OT services and is not progressing towards goals. Patient required increased assist for mobility this session, Min-Mod A x2 with impaired RUE/RLE coordination, buckling throughout but increasing with fatigue. She was able to follow <50% of commands, improved with therapist-directed initiation. Soiled brief noted therefore new brief donned with Max A while PT assisting with standing balance, caregiver (JOSEP) hypervigilant and hands on with decreased safety & joint protection techniques for RUE with standing balance. Attempted to reposition patient back into supine, slightly combative  but long sitting in bed with restraints donned, bed rails up, and multiple staff & family in room. Anticipate patient may benefit more from d/c home with family & [address] [place] [address], however patient remains unsafe, would benefit from continued rehab for family education & strengthening standpoint. Family to make more care decisions soon, physician in room talking with family upon session end. Current Level of Function Impacting Discharge (ADLs): Mod-Max A for upper body ADLs, Max-Total A for lower body ADLs, Min-Mod A x2 for mobility    Other factors to consider for discharge: fall risk, PMH, Down syndrome, decreased command follow, medical complexity         PLAN :  Goals have been updated based on progression since last assessment. Patient continues to benefit from skilled intervention to address the above impairments. Continue to follow patient 3 times a week to address goals. Recommend with staff: Recommend with nursing patient to complete as able in order to maintain strength, endurance and independence: ADLs with supervision/setup, OOB to chair or bed in chair position 3x/day and mobilizing to the [facility] for toileting with 2 assist. Thank you for your assistance.      Recommend next OT session: lower body ADLs, strengthening    Recommendation for discharge: (in order for the patient to meet his/her long term goals)  To be determined: HHOT vs TBD pending progress and medical course/plan, would benefit from SNF rehab to maximize safety, but if d/c hoem would require physical assist of 2 for all ADLs and mobility with 24/7 SPV     This discharge recommendation:  Has been made in collaboration with the attending provider and/or case management    IF patient discharges home will need the following DME: to be determined (TBD)       SUBJECTIVE:   Patient stated Isaura Annie! [facility]    OBJECTIVE DATA SUMMARY:   Cognitive/Behavioral Status:  Neurologic State: Alert;Eyes open spontaneously  Orientation Level: Unable to verbalize(nonverbal at baseline)  Cognition: No command following;Poor safety awareness             Functional Mobility and Transfers for ADLs:  Bed Mobility:  Supine to Sit: Supervision  Sit to Supine: Maximum assistance  Scooting: Minimum assistance    Transfers:  Sit to Stand: Assist x2;Minimum assistance; Moderate assistance          Balance:  Sitting: Impaired  Sitting - Static: Fair (occasional)  Sitting - Dynamic: Fair (occasional)  Standing: Impaired; With support  Standing - Static: Fair;Constant support  Standing - Dynamic : Poor;Constant support    ADL Intervention:  Feeding  Feeding Assistance: Total assistance (dependent)(NGT)                   Upper Body Dressing Assistance  Hospital Gown: Maximum assistance(donning LUE in sleeve)  Cues: Physical assistance; Tactile cues provided;Verbal cues provided;Visual cues provided    Lower Body Dressing Assistance  Protective Undergarmet: Maximum assistance  Socks: Maximum assistance(donned w/ Total A but able to adjust w/ prop sit in long sit)  Leg Crossed Method Used: No  Position Performed: Bending forward method  Cues: Physical assistance; Tactile cues provided;Verbal cues provided;Visual cues provided    Toileting  Toileting Assistance: Maximum assistance  Bladder Hygiene:  Total assistance (dependent)(purewick)  Clothing Management: Maximum assistance  Cues: Physical assistance for pants up;Physical assistance for pants down; Tactile cues provided;Verbal cues provided;Visual cues provided         Therapeutic Exercises:   Brief ambulation in room with bilateral HHA and Min-Mod A x2, RLE buckling (increased with fatigue), difficulty coordinating RUE with motion, seemingly intermittent visual neglect to the R? Pain:  None reported, no nonverbal signs given    Activity Tolerance:   Fair  Please refer to the flowsheet for vital signs taken during this treatment.     After treatment patient left in no apparent distress:   Supine in bed, Bed / chair alarm activated, Caregiver / family present, Restraints, and side rails x4     COMMUNICATION/COLLABORATION:   The patients plan of care was discussed with: Physical Therapist, Speech Therapist, Registered Nurse, and     GLENIS Nicholson, OTR/L  Time Calculation: 27 mins

## 2020-01-13 NOTE — PROGRESS NOTES
TRANSFER - IN REPORT:    Verbal report received from BARBARA Cardoza(name) on Subhash Hassan  being received from ICU (unit) for routine progression of care      Report consisted of patients Situation, Background, Assessment and   Recommendations(SBAR). Information from the following report(s) SBAR, Kardex, Intake/Output, MAR, Recent Results, Cardiac Rhythm NSR, ST and Dual Neuro Assessment was reviewed with the receiving nurse. Opportunity for questions and clarification was provided. Assessment completed upon patients arrival to unit and care assumed.

## 2020-01-13 NOTE — PROGRESS NOTES
Hospitalist Progress Note  Ej Barbosa MD  Answering service: [phone] OR [phone] from in house phone        Date of Service:  2020  NAME:  Joy Miller  :  1955  MRN:  [id number]  PCP: Randi Castañeda NP    Chief Complaint: SDH    Admission Summary:     Joy Miller is a 71 y.o. female who presented with fall/SDH    Interval history / Subjective:   Patient seen for Follow up of CC:   Patient seen and examined by the bedside, Labs, images and notes reviewed  Patient is awake, was seen during PT/OT/SLP session. poor poor participating physical therapy, agitated, was aspirating on all types of food including applesauce and ice. Discussed with family by the bedside, patient is reported to be aggressive at home as well, throws tantrums by falling on the ground and probably where the falls will remain subdural hematoma. They understand that patient has multiple issues, has Down syndrome and is likely at risk of early dementia due to Down syndrome. They would like to talk to palliative care team and to discuss options regarding comfort/hospice. Discussed with nursing staff, no acute issues overnight, orders reviewed. Assessment & Plan:     #. Chronic subdural hematoma with midline shift status post craniotomy 2020   repeat CT scans reviewed, improving, weaning AED, on Keppra for now for seizure prophylaxis. Neurosurgery and neurology on board. PT/OT/speech as able, frequent neuro checks     #.  Metabolic encephalopathy, acute on chronic due to delirium and subdural hematoma  Slow improvement, continue sitter, closely monitor hemodynamics, continue soft restaints  Continue Seroquel, trazodone and Haldol PRN     #.  Seizures reported as facial twitchingweaning AED, neurology on board, on [address]  Had 24-hour EEG completed 1/10/2020 which continues to show underlying toxic metabolic abnormality     #. Down syndromesupportive care     #.   Dysphagia, due to acute encephalopathy, Dobbhoff in placecontinue tube feeds, frequent orientation, SLP eval noted. Patient was aspirating on all types of food.     #.  Hypothyroidismcontinue Synthroid    #. Given multitude of problems, worsening dysphagia, encephalopathy, will benefit from palliative care evaluation to discuss with family regarding goals of care. Currently she is full code but family was leaning towards DNR/DNI. Code status: Full Code    DVT prophylaxis: SCDs    Care Plan discussed with: Patient/Family and Nurse    Disposition: TBD    Hospital Problems  Date Reviewed: 2020          Codes Class Noted POA    Seizures ([facility] [address] ICD-10-CM: R56.9  ICD-9-CM: 780.39  2020 Unknown        * (Principal) SDH (subdural hematoma) ([facility] [address] ICD-10-CM: W67.7P4G  ICD-9-CM: 432.1  2020 Yes        Down's syndrome (Chronic) ICD-10-CM: Q90.9  ICD-9-CM: 758.0  2020 Yes        Acquired hypothyroidism (Chronic) ICD-10-CM: E03.9  ICD-9-CM: 244.9  2020 Yes                Review of Systems:   Review of systems not obtained due to patient factors. ecephalopathy    Physical Examination:     General appearance: fatigued, uncooperative, mild distress, appears older than stated age  Head: left side craniotomy wound/staples in place, no drainage/bleeding  Eyes: conjunctivae/corneas clear. Oral mucosa dry, Dobbhoff in place  Lungs: CTA BL, nW nR  Heart: RRR, NM  Neurologic: awake, confused, does not follow commands. Vital Signs:    Last 24hrs VS reviewed since prior progress note.  Most recent are:    Visit Vitals  /61 (BP 1 Location: Left arm, BP Patient Position: At rest)   Pulse 90   Temp 98.6 °F (37 °C)   Resp 16   Ht 4' 5\" (1.346 m)   Wt 60.1 kg (132 lb 9.6 oz)   SpO2 100%   BMI 33.19 kg/m²         Intake/Output Summary (Last 24 hours) at 2020 1710  Last data filed at 2020 1600  Gross per 24 hour   Intake 1678 ml   Output 1050 ml   Net 628 ml        Tmax:  Temp (24hrs), Av.5 °F (36.9 °C), Min:97.6 °F (36.4 °C), Max:99.1 °F (37.3 °C)      Data Review:   Data reviewed by myself:  Xr Abd (kub)    Result Date: 1/6/2020  CLINICAL HISTORY: Evaluate NG tube/OG tube INDICATION: Evaluate NG/OG tube COMPARISON: None FINDINGS: AP Limited supine view of the abdomen is obtained. The OG/NG tube tip lies in appropriate position below the diaphragm. Otherwise no significant interval change or acute findings. Cardiac megaly. Basilar atelectasis. Patient is on a cardiac monitor. PICC line catheter on the right. IMPRESSION: Gastric tube tip is in appropriate position for use. Ct Head Wo Cont    Result Date: 1/8/2020  EXAM: CT HEAD WO CONT INDICATION: AMS, L SDH COMPARISON: None. CONTRAST: None. TECHNIQUE: Unenhanced CT of the head was performed using 5 mm images. Brain and bone windows were generated. CT dose reduction was achieved through use of a standardized protocol tailored for this examination and automatic exposure control for dose modulation. FINDINGS: There is persistent shift of the midline to the right by 2.5 mm, previously 3.1 mm. There is mild hydrocephalus which is stable. There is no significant white matter disease. The fluid collection adjacent to the right frontal lobe measures 10.8 mm and previously measured 11.7 mm. The mixed subdural hematoma adjacent to left frontal lobe measures 13.1 mm and previously measured 12.9 mm. The mixed subdural hematoma adjacent to the left parietal lobe measures 12.2 mm and previously measured 12.0 mm. Hematoma adjacent to left frontal lobe measures 9.5 mm and previously measured 9.3 mm. The basilar cisterns are open. No acute infarct is identified. The bone windows demonstrate craniectomy. The visualized portions of the paranasal sinuses and mastoid air cells are clear. IMPRESSION: Interval slight improvement in midline shift. Ct Head Wo Cont    Result Date: 1/7/2020  EXAM: CT HEAD WO CONT INDICATION: assess bleed COMPARISON: January 4. CONTRAST: None.  TECHNIQUE: Unenhanced CT of the head was performed using 5 mm images. Brain and bone windows were generated. CT dose reduction was achieved through use of a standardized protocol tailored for this examination and automatic exposure control for dose modulation. FINDINGS: There is slight shift of the midline to the right of 3.1 mm, previously 2.0 mm. There is no significant white matter disease. The left subdural hematoma measures 14.7 mm, previously 13.4 mm adjacent to left frontal lobe and 9.3 mm adjacent to the left parietal lobe, previously 8.3 mm. The fluid collection adjacent to the right frontal lobe measures 11.7 mm, previously 11.4 mm. The drainage catheter has been removed in the interval. The basilar cisterns are open. No acute infarct is identified. The bone windows demonstrate craniotomy. There is fluid in the left ethmoid air cells and mucosal thickening of the maxillary sinus. IMPRESSION: Slight interval increase in left subdural hematoma status post removal of drainage catheter, with slight increase of midline shift to the right. Ct Head Wo Cont    Result Date: 1/4/2020  EXAM:  CT HEAD WO CONT INDICATION:   post op craniotomy COMPARISON: CT head 1/30/2020. TECHNIQUE: Unenhanced CT of the head was performed using 5 mm images. Brain and bone windows were generated. CT dose reduction was achieved through use of a standardized protocol tailored for this examination and automatic exposure control for dose modulation. FINDINGS: Stable postsurgical changes of left frontal craniotomy and chronic subdural hematoma evacuation, with a left-sided subdural drain in place. No significant change in size of mixed density left cerebral convexity subdural hematoma with acute hyperdense components, measuring up to 11 mm in maximal thickness. Scattered left cerebral pneumocephalus is unchanged. There is unchanged minimal 2 mm of rightward midline shift.  There is an unchanged chronic right subdural hematoma measuring up to 11 mm in maximal thickness. No acute hyperdense component is identified within this subdural hematoma. The ventricles are normal in size and position. Basilar cisterns are patent. No evidence of acute infarct. The paranasal sinuses, mastoid air cells, and middle ears are clear. Unchanged medially deviated right globe. IMPRESSION: 1. Expected postoperative changes of left subdural hematoma evacuation as above, with no significant change in size of mixed density left subdural hematoma with acute hyperdense components. Stable chronic right subdural hematoma. Stable minimal 2 mm of rightward midline shift. Ct Head Wo Cont    Result Date: 1/3/2020  EXAM:  CT HEAD WO CONT INDICATION:  assess subdural hematoma post drainage. TECHNIQUE: Thin axial images were obtained through the calvarium and saved with standard and bone window algorithm. Coronal and sagittal reconstructions were generated. CT dose reduction was achieved through use of a standardized protocol tailored for this examination and automatic exposure control for dose modulation. COMPARISON: CT head yesterday. Previous CT head 11/20/18 FINDINGS: Since the prior CT patient is undergone a left frontal craniotomy for treatment of large chronic primarily left-sided multiloculated subdural hematoma which had significant mass effect, developing herniation as well as developing right-sided hydrocephalus. Postoperative findings are demonstrated with subdural drain in place. Residual extra-axial collection on the left has higher density consistent with more acute blood was some gas/air. Some root persistent effacement of left convexity sulci. This represents a marked interval improvement from the preoperative exam and there has been near complete return of central structures to the midline. There is a chronic right subdural hematoma with some effacement of the sulci.  This appears somewhat more prominent on today's exam, however it is likely in part related to interval decompression of the brain and expansion causing increased constant acuity of the right sided subdural hematoma over the frontal and temporal convexity. Small amount of high density blood in the dependent posterior right subdural similar to the preoperative exam with no interval new hemorrhage demonstrated. Otherwise, cerebral density is within normal limits with no evidence of acute brain infarction. IMPRESSION: 1. Interval left frontal craniotomy and drainage of large left multiloculated chronic subdural hematoma. 2. Postoperative findings on the left as described above. 3. Residual right frontal/temporal convexity chronic subdural hematoma without definite interval change. 4. Near complete return of central structures to the midline. Ct Head Wo Cont    Result Date: 1/2/2020  EXAM:  CT HEAD WO CONT INDICATION:   ams COMPARISON: CT head 1/2/2020. TECHNIQUE: Unenhanced CT of the head was performed using 5 mm images. Brain and bone windows were generated. CT dose reduction was achieved through use of a standardized protocol tailored for this examination and automatic exposure control for dose modulation. FINDINGS: Predominantly hypodense to the left cerebral convexity subdural fluid collection measuring up to 2.0 cm in maximal thickness. There are hyperdense septations noted within the fluid collection, indicating a chronic component. No other obvious hyperdense components are identified. There is mass effect on the adjacent brain parenchyma, and resultant 10 mm of rightward midline shift. There is also descending transtentorial herniation with effacement of the suprasellar cistern. There is a small right cerebral convexity hypodense subdural fluid collection which measures up to 8 mm in maximal thickness along the frontal convexity. There is mild local mass effect on the adjacent brain parenchyma. There is no acute infarct. The paranasal sinuses, mastoid air cells, and middle ears are clear.  The orbital contents are within normal limits. Disconjugate gaze with medial deviation of the right globe. There are no significant osseous or extracranial soft tissue lesions. IMPRESSION: 1. Predominantly hypodense left cerebral convexity subdural fluid collection with hyperdense membranes, most consistent with a chronic subdural hematoma. Mass effect results in 10 mm of rightward midline shift and descending transtentorial herniation with effacement of the suprasellar cistern. No large acute component is identified within the subdural hematoma. 2. Additional small right cerebral convexity hypodense subdural fluid collection measuring up to 8 mm in maximal thickness, most consistent with an additional chronic subdural hematoma. The findings were called to Jose Cordoba on 1/2/2020 at 4:11 PM by Dr. Pham Morton. [address] [facility]    Result Date: 1/6/2020  EXAM:  XR CHEST PORT INDICATION: Hypoxia COMPARISON: None TECHNIQUE: Portable AP semiupright chest view at 0833 hours FINDINGS: Cardiac monitoring wires overlie the thorax. There is moderate cardiac silhouette enlargement. There are mild diffuse interstitial opacities with a basilar predominance. There is no pleural effusion or pneumothorax. The visualized bones and upper abdomen are age-appropriate. IMPRESSION: Mild diffuse interstitial opacities with a basilar predominance suggesting pulmonary edema. Moderate cardiac silhouette enlargement. Cta Head Neck W Cont    Result Date: 1/2/2020  EXAM:  CTA HEAD NECK W CONT INDICATION:   altered mental status, limp, slumping over, right eye deviated inward. Limited exam due to Down's Syndrome COMPARISON:  CT head 1/2/2020. CONTRAST:  60 mL of Isovue-370. TECHNIQUE:  Unenhanced  images were obtained to localize the volume for acquisition. Multislice helical axial CT angiography was performed from the aortic arch to the top of the head during uneventful rapid bolus intravenous contrast administration.    Coronal and sagittal reformations and 3D post processing was performed. CT dose reduction was achieved through use of a standardized protocol tailored for this examination and automatic exposure control for dose modulation. FINDINGS: CTA Head: There is no evidence of large vessel occlusion or flow-limiting stenosis of the intracranial internal carotid, anterior cerebral, and middle cerebral arteries. The anterior communicating artery is patent. There is no evidence of large vessel occlusion or flow-limiting stenosis of the intracranial vertebral arteries, basilar artery, or posterior cerebral arteries. Diminutive basilar artery on a congenital basis, with fetal origin of the bilateral posterior cerebral arteries. There is no evidence of aneurysm or vascular malformation. The dural venous sinuses and deep cerebral venous system are patent. No evidence of abnormal parenchymal enhancement on delayed phase images. Redemonstrated bilateral chronic subdural hematomas, left larger than right, with rightward midline shift and descending transtentorial herniation. CTA NECK: NASCET method was utilized for calculating stenosis. The aortic arch is unremarkable. The common carotid arteries are normal in course and caliber bilaterally. There is no evidence of significant stenosis in the cervical right internal carotid artery. There is no evidence of significant stenosis in the cervical left internal carotid artery. There is a codominant vertebrobasilar arterial system. The vertebral arteries and imaged portion of the basilar artery are normal in course, size and contour without significant stenosis. Visualized soft tissues of the neck are unremarkable. Atrophic thyroid gland. Visualized lung apices are clear. The proximal thoracic esophagus is dilated and full of debris. No acute fracture or aggressive osseous lesion. Scoliosis partially visualized.  Multilevel degenerative disc disease in the cervical spine most advanced at C3-C4 and C5-C6. Multilevel facet arthropathy throughout the cervical spine. IMPRESSION: CTA Head: 1. No evidence of significant stenosis or aneurysm. 2. Redemonstrated bilateral chronic subdural hematomas, left larger than right, with rightward midline shift and descending transtentorial herniation. CTA Neck: 1. No evidence of significant stenosis. No results found for: SDES  Lab Results   Component Value Date/Time    Culture result: MIXED UROGENITAL SKYLA ISOLATED 02/24/2018 04:51 PM     All Micro Results     None          Labs: reviewed by myself. Recent Labs     01/13/20 0520 01/12/20 0417   WBC 4.2 5.3   HGB 12.7 12.6   HCT 39.1 39.6    247     Recent Labs     01/13/20 0520 01/12/20 0417 01/11/20 0410    138 142   K 3.9 4.3 3.5    102 106   CO2 32 34* 33*   BUN 14 13 10   CREA 0.60 0.65 0.58   * 112* 112*   CA 8.3* 8.5 8.1*   MG 2.4 2.6* 2.3   PHOS 3.2 3.5 3.0     No results for input(s): SGOT, GPT, ALT, AP, TBIL, TBILI, TP, ALB, GLOB, GGT, AML, LPSE in the last 72 hours. No lab exists for component: AMYP, HLPSE  No results for input(s): INR, PTP, APTT, INREXT in the last 72 hours. No results for input(s): FE, TIBC, PSAT, FERR in the last 72 hours. No results found for: FOL, RBCF   No results for input(s): PH, PCO2, PO2 in the last 72 hours. No results for input(s): CPK, CKNDX, TROIQ in the last 72 hours.     No lab exists for component: CPKMB  Lab Results   Component Value Date/Time    Cholesterol, total 200 (H) 11/02/2009 11:48 AM    HDL Cholesterol 65 (H) 11/02/2009 11:48 AM    LDL, calculated 108 (H) 11/02/2009 11:48 AM    Triglyceride 135 11/02/2009 11:48 AM    CHOL/HDL Ratio 3.1 11/02/2009 11:48 AM     Lab Results   Component Value Date/Time    Glucose (POC) 105 (H) 01/13/2020 11:39 AM    Glucose (POC) 103 (H) 01/13/2020 06:00 AM    Glucose (POC) 86 01/12/2020 03:40 PM    Glucose (POC) 122 (H) 01/10/2020 06:47 PM    Glucose (POC) 128 (H) 01/10/2020 06:07 AM     Lab Results   Component Value Date/Time    Color YELLOW/STRAW 01/02/2020 02:31 PM    Appearance CLEAR 01/02/2020 02:31 PM    Specific gravity 1.020 01/02/2020 02:31 PM    pH (UA) 7.0 01/02/2020 02:31 PM    Protein NEGATIVE  01/02/2020 02:31 PM    Glucose NEGATIVE  01/02/2020 02:31 PM    Ketone NEGATIVE  01/02/2020 02:31 PM    Bilirubin NEGATIVE  01/02/2020 02:31 PM    Urobilinogen 1.0 01/02/2020 02:31 PM    Nitrites NEGATIVE  01/02/2020 02:31 PM    Leukocyte Esterase NEGATIVE  01/02/2020 02:31 PM    Epithelial cells FEW 01/02/2020 02:31 PM    Bacteria NEGATIVE  01/02/2020 02:31 PM    WBC 0-4 01/02/2020 02:31 PM    RBC 0-5 01/02/2020 02:31 PM         Medications Reviewed:     Current Facility-Administered Medications   Medication Dose Route Frequency    traZODone (DESYREL) tablet 50 mg  50 mg Oral QHS    QUEtiapine (SEROquel) tablet 25 mg  25 mg Oral BID    levETIRAcetam (KEPPRA) oral solution 1,000 mg  1,000 mg Per NG tube Q12H    bisacodyl (DULCOLAX) suppository 10 mg  10 mg Rectal DAILY PRN    senna (SENOKOT) tablet 17.2 mg  2 Tab Oral DAILY    docusate (COLACE) 50 mg/5 mL oral liquid 100 mg  100 mg Oral DAILY    famotidine (PEPCID) 40 mg/5 mL (8 mg/mL) oral suspension 20 mg  20 mg Per NG tube BID    levothyroxine (SYNTHROID) tablet 75 mcg  75 mcg Per NG tube DAILY    acetaminophen (TYLENOL) suppository 650 mg  650 mg Rectal Q4H PRN    dextrose 10 % infusion 125-250 mL  125-250 mL IntraVENous PRN    miconazole (MICOTIN) 2 % powder   Topical BID    sodium chloride (NS) flush 5-40 mL  5-40 mL IntraVENous Q8H    sodium chloride (NS) flush 5-40 mL  5-40 mL IntraVENous PRN    acetaminophen (TYLENOL) solution 650 mg  650 mg Oral Q4H PRN    ondansetron (ZOFRAN) injection 4 mg  4 mg IntraVENous Q4H PRN    sodium chloride (NS) flush 5-40 mL  5-40 mL IntraVENous Q8H    sodium chloride (NS) flush 5-40 mL  5-40 mL IntraVENous PRN    naloxone (NARCAN) injection 0.4 mg  0.4 mg IntraVENous PRN ______________________________________________________________________  EXPECTED LENGTH OF STAY: 6d 16h  ACTUAL LENGTH OF STAY:          11                 Reinier Simon MD     Patient's emergency contacts:  Extended Emergency Contact Information  Primary Emergency Contact: Samuel Castro  Address: [address] [place] [address] [address] [place] [address] Phone: [phone]  Relation: Brother  Secondary Emergency Contact: [facility] Phone: [phone]  Relation: Other Relative

## 2020-01-13 NOTE — PROGRESS NOTES
Problem: Falls - Risk of  Goal: *Absence of Falls  Description  Document Bharathi Arechiga Fall Risk and appropriate interventions in the flowsheet. Outcome: Progressing Towards Goal  Note: Fall Risk Interventions:  Mobility Interventions: OT consult for ADLs, PT Consult for mobility concerns, PT Consult for assist device competence    Mentation Interventions: Adequate sleep, hydration, pain control, Familiar objects from home    Medication Interventions: Evaluate medications/consider consulting pharmacy    Elimination Interventions: Call light in reach, Stay With Me (per policy), Toileting schedule/hourly rounds    History of Falls Interventions: Investigate reason for fall, Evaluate medications/consider consulting pharmacy         Problem: Patient Education: Go to Patient Education Activity  Goal: Patient/Family Education  Outcome: Progressing Towards Goal     Problem: Pressure Injury - Risk of  Goal: *Prevention of pressure injury  Description  Document Yosef Scale and appropriate interventions in the flowsheet.   Outcome: Progressing Towards Goal  Note: Pressure Injury Interventions:  Sensory Interventions: Assess changes in LOC    Moisture Interventions: Check for incontinence Q2 hours and as needed, Apply protective barrier, creams and emollients, Absorbent underpads    Activity Interventions: Assess need for specialty bed    Mobility Interventions: Assess need for specialty bed    Nutrition Interventions: Discuss nutritional consult with provider    Friction and Shear Interventions: Apply protective barrier, creams and emollients, HOB 30 degrees or less                Problem: Patient Education: Go to Patient Education Activity  Goal: Patient/Family Education  Outcome: Progressing Towards Goal     Problem: Patient Education: Go to Patient Education Activity  Goal: Patient/Family Education  Outcome: Progressing Towards Goal     Problem: Patient Education: Go to Patient Education Activity  Goal: Patient/Family Education  Outcome: Progressing Towards Goal     Problem: Patient Education: Go to Patient Education Activity  Goal: Patient/Family Education  Outcome: Progressing Towards Goal     Problem: Non-Violent Restraints  Goal: *Removal from restraints as soon as assessed to be safe  Outcome: Progressing Towards Goal  Goal: *No harm/injury to patient while restraints in use  Outcome: Progressing Towards Goal  Goal: *Patient's dignity will be maintained  Outcome: Progressing Towards Goal  Goal: *Patient Specific Goal (EDIT GOAL, INSERT TEXT)  Outcome: Progressing Towards Goal  Goal: Non-violent Restaints:Standard Interventions  Outcome: Progressing Towards Goal  Goal: Non-violent Restraints:Patient Interventions  Outcome: Progressing Towards Goal  Goal: Patient/Family Education  Outcome: Progressing Towards Goal     Problem: Hemorrhagic Stroke: Day 5 through Discharge  Goal: Activity/Safety  Outcome: Progressing Towards Goal  Goal: Consults, if ordered  Outcome: Progressing Towards Goal  Goal: Diagnostic Test/Procedures  Outcome: Progressing Towards Goal  Goal: Nutrition/Diet  Outcome: Progressing Towards Goal  Goal: Medications  Outcome: Progressing Towards Goal  Goal: Respiratory  Outcome: Progressing Towards Goal  Goal: Treatments/Interventions/Procedures  Outcome: Progressing Towards Goal  Goal: Psychosocial  Outcome: Progressing Towards Goal  Goal: *Hemodynamically stable  Outcome: Progressing Towards Goal  Goal: *Verbalizes anxiety and depression are reduced or absent  Outcome: Progressing Towards Goal  Goal: *Absence of aspiration  Outcome: Progressing Towards Goal  Goal: *Absence of signs and symptoms of DVT  Outcome: Progressing Towards Goal  Goal: *Optimal pain control at patient's stated goal  Outcome: Progressing Towards Goal  Goal: *Tolerating diet  Outcome: Progressing Towards Goal  Goal: *Progressive mobility and function  Outcome: Progressing Towards Goal  Goal: *Rehabilitation readiness  Outcome: Progressing Towards Goal     Problem: Hemorrhagic Stroke: Discharge Outcomes  Goal: *Verbalizes anxiety and depression are reduced or absent  Outcome: Progressing Towards Goal  Goal: *Verbalize understanding of risk factor modification(Stroke Metric)  Outcome: Progressing Towards Goal  Goal: *Optimal pain control at patient's stated goal  Outcome: Progressing Towards Goal  Goal: *Hemodynamically stable  Outcome: Progressing Towards Goal  Goal: *Absence of aspiration pneumonia  Outcome: Progressing Towards Goal  Goal: *Aware of needed dietary changes  Outcome: Progressing Towards Goal  Goal: *Verbalizes understanding and describes medication purposes and frequencies  Outcome: Progressing Towards Goal  Goal: *Tolerating diet  Outcome: Progressing Towards Goal  Goal: *Absence of signs and symptoms of DVT  Outcome: Progressing Towards Goal  Goal: *Absence of aspiration  Outcome: Progressing Towards Goal  Goal: *Progressive mobility and function  Outcome: Progressing Towards Goal  Goal: *Home safety concerns addressed  Outcome: Progressing Towards Goal     Problem: Nutrition Deficit  Goal: *Optimize nutritional status  Outcome: Progressing Towards Goal

## 2020-01-13 NOTE — PROGRESS NOTES
Problem: Falls - Risk of  Goal: *Absence of Falls  Description  Document Alejandro Reas Fall Risk and appropriate interventions in the flowsheet. Outcome: Progressing Towards Goal  Note: Fall Risk Interventions:  Mobility Interventions: OT consult for ADLs, PT Consult for mobility concerns, PT Consult for assist device competence    Mentation Interventions: Adequate sleep, hydration, pain control, Familiar objects from home    Medication Interventions: Evaluate medications/consider consulting pharmacy    Elimination Interventions: Call light in reach, Stay With Me (per policy), Toileting schedule/hourly rounds    History of Falls Interventions: Investigate reason for fall, Evaluate medications/consider consulting pharmacy         Problem: Pressure Injury - Risk of  Goal: *Prevention of pressure injury  Description  Document Yosef Scale and appropriate interventions in the flowsheet.   Outcome: Progressing Towards Goal  Note: Pressure Injury Interventions:  Sensory Interventions: Assess changes in LOC    Moisture Interventions: Check for incontinence Q2 hours and as needed, Apply protective barrier, creams and emollients, Absorbent underpads    Activity Interventions: Assess need for specialty bed    Mobility Interventions: Assess need for specialty bed    Nutrition Interventions: Discuss nutritional consult with provider    Friction and Shear Interventions: Apply protective barrier, creams and emollients, HOB 30 degrees or less                Problem: Patient Education: Go to Patient Education Activity  Goal: Patient/Family Education  Outcome: Progressing Towards Goal     Problem: Patient Education: Go to Patient Education Activity  Goal: Patient/Family Education  Outcome: Progressing Towards Goal     Problem: Patient Education: Go to Patient Education Activity  Goal: Patient/Family Education  Outcome: Progressing Towards Goal     Problem: Non-Violent Restraints  Goal: *Removal from restraints as soon as assessed to be safe  Outcome: Progressing Towards Goal  Goal: *No harm/injury to patient while restraints in use  Outcome: Progressing Towards Goal  Goal: *Patient's dignity will be maintained  Outcome: Progressing Towards Goal  Goal: Non-violent Restaints:Standard Interventions  Outcome: Progressing Towards Goal  Goal: Patient/Family Education  Outcome: Progressing Towards Goal     Problem: Hemorrhagic Stroke: Day 5 through Discharge  Goal: Activity/Safety  Outcome: Progressing Towards Goal  Goal: Consults, if ordered  Outcome: Progressing Towards Goal  Goal: Diagnostic Test/Procedures  Outcome: Progressing Towards Goal  Goal: Nutrition/Diet  Outcome: Progressing Towards Goal  Goal: Medications  Outcome: Progressing Towards Goal  Goal: Respiratory  Outcome: Progressing Towards Goal  Goal: Treatments/Interventions/Procedures  Outcome: Progressing Towards Goal  Goal: Psychosocial  Outcome: Progressing Towards Goal  Goal: *Hemodynamically stable  Outcome: Progressing Towards Goal  Goal: *Verbalizes anxiety and depression are reduced or absent  Outcome: Progressing Towards Goal  Goal: *Absence of aspiration  Outcome: Progressing Towards Goal  Goal: *Absence of signs and symptoms of DVT  Outcome: Progressing Towards Goal  Goal: *Optimal pain control at patient's stated goal  Outcome: Progressing Towards Goal  Goal: *Tolerating diet  Outcome: Progressing Towards Goal  Goal: *Progressive mobility and function  Outcome: Progressing Towards Goal  Goal: *Rehabilitation readiness  Outcome: Progressing Towards Goal     Problem: Hemorrhagic Stroke: Discharge Outcomes  Goal: *Verbalizes anxiety and depression are reduced or absent  Outcome: Progressing Towards Goal  Goal: *Verbalize understanding of risk factor modification(Stroke Metric)  Outcome: Progressing Towards Goal  Goal: *Optimal pain control at patient's stated goal  Outcome: Progressing Towards Goal  Goal: *Hemodynamically stable  Outcome: Progressing Towards Goal  Goal: *Absence of aspiration pneumonia  Outcome: Progressing Towards Goal  Goal: *Aware of needed dietary changes  Outcome: Progressing Towards Goal  Goal: *Verbalizes understanding and describes medication purposes and frequencies  Outcome: Progressing Towards Goal  Goal: *Tolerating diet  Outcome: Progressing Towards Goal  Goal: *Absence of signs and symptoms of DVT  Outcome: Progressing Towards Goal  Goal: *Absence of aspiration  Outcome: Progressing Towards Goal  Goal: *Progressive mobility and function  Outcome: Progressing Towards Goal  Goal: *Home safety concerns addressed  Outcome: Progressing Towards Goal     Problem: Nutrition Deficit  Goal: *Optimize nutritional status  Outcome: Progressing Towards Goal

## 2020-01-13 NOTE — PROGRESS NOTES
Neurosurgery Progress Note  Kathryn Robert, [facility]  [phone]        Admit Date: 2020   LOS: 11 days        Daily Progress Note: 2020    POD: 11 Day Post-Op    S/P: Procedure(s):  CRANIOTOMY LEFT FRONTAL FOR SUBDURAL HEMATOMA    HPI: The patient has a history of Down's syndrome. She was brought to the ER by caregivers due to lethargy and confusion. She also has been having balance issues and difficulty standing recently. She was found to have a large chronic SDH on the left with membranes in it. She transferred to [facility] where she was taken to the OR by Dr. Jazmin Gabriel for a craniotomy to evacuate the SDH. Subjective:   Pt transferred out of ICU with sitter. Restarted seroquel and trazodone. Pt AEDs have been weaned down. Pt was agitated and getting out of bed at night. Pt needs speech to re-eval as she continues with dobhoff tube for nutritional support. She tends to interact with therapy better when family is around. Unable to obtain ROS due to AMS. Objective:     Vital signs  Temp (24hrs), Av.7 °F (37.1 °C), Min:98.2 °F (36.8 °C), Max:99.1 °F (37.3 °C)   701 -  1900  In: 240   Out: -    190 -  0700  In: 2498   Out: 2300 [Urine:2300]    Visit Vitals  BP 94/45 (BP 1 Location: Left arm, BP Patient Position: At rest)   Pulse 70   Temp 99.1 °F (37.3 °C)   Resp 10   Ht 4' 5\" (1.346 m)   Wt 58.6 kg (129 lb 3 oz)   SpO2 94%   BMI 32.34 kg/m²    O2 Flow Rate (L/min): 2 l/min O2 Device: Room air     Pain control  Pain Assessment  Pain Scale 1: Adult Nonverbal Pain Scale  Pain Intensity 1: 0    PT/OT  Gait     Gait  Base of Support: Widened  Speed/Sharmila: Shuffled  Gait Abnormalities: Decreased step clearance, Trendelenburg, Trunk sway increased, Shuffling gait  Ambulation - Level of Assistance: Minimal assistance, Assist x2  Distance (ft): 30 Feet (ft)  Assistive Device: Gait belt(bilateral HHA)           Physical Exam:  Gen:NAD. Neuro: Lethargic.  Opens eyes to voice and looks at me. Tracks with her eyes and pulls at her restraints when I talk to her. Does not follow commands. Not talking to me. LOVE spontaneously just not to command. Pupils pinpoint. Right eye medial deviation. Dysconjugate gaze. Skin: Left scalp incision C/D/I with staples in place. CT head without contrast on 01/02/20 shows predominantly hypodense left cerebral convexity subdural fluid collection with hyperdense membranes, most consistent with a chronic subdural hematoma. Mass effect results in 10 mm of rightward midline shift and descending transtentorial herniation with effacement of the suprasellar cistern. No large acute component is identified within the subdural hematoma. Additional small right cerebral convexity hypodense subdural fluid collection measuring up to 8 mm in maximal thickness, most consistent with an additional chronic subdural hematoma. CT head without contrast on 01/03/20 shows interval left frontal craniotomy and drainage of large left multiloculated chronic subdural hematoma. Postoperative findings on the left. Residual right frontal/temporal convexity chronic subdural hematoma without definite interval change. Near complete return of central structures to the midline. CT head without contrast on 01/04/20 shows expected postoperative changes of left subdural hematoma evacuation as above, with no significant change in size of mixed density left subdural hematoma with acute hyperdense components. Stable chronic right subdural hematoma. Stable minimal 2 mm of rightward midline shift. CT head without contrast on 01/07/20 shows slight interval increase in left subdural hematoma status post removal of drainage catheter, with slight increase of midline shift to the right. CT head without contrast on 01/08/202 shows interval slight improvement in midline shift.     24 hour results:    Recent Results (from the past 24 hour(s))   GLUCOSE, POC    Collection Time: 01/12/20  3:40 PM Result Value Ref Range    Glucose (POC) 86 65 - 100 mg/dL    Performed by Colby Max    METABOLIC PANEL, BASIC    Collection Time: 01/13/20  5:20 AM   Result Value Ref Range    Sodium 137 136 - 145 mmol/L    Potassium 3.9 3.5 - 5.1 mmol/L    Chloride 100 97 - 108 mmol/L    CO2 32 21 - 32 mmol/L    Anion gap 5 5 - 15 mmol/L    Glucose 115 (H) 65 - 100 mg/dL    BUN 14 6 - 20 MG/DL    Creatinine 0.60 0.55 - 1.02 MG/DL    BUN/Creatinine ratio 23 (H) 12 - 20      GFR est AA >60 >60 ml/min/1.73m2    GFR est non-AA >60 >60 ml/min/1.73m2    Calcium 8.3 (L) 8.5 - 10.1 MG/DL   CBC WITH AUTOMATED DIFF    Collection Time: 01/13/20  5:20 AM   Result Value Ref Range    WBC 4.2 3.6 - 11.0 K/uL    RBC 4.09 3.80 - 5.20 M/uL    HGB 12.7 11.5 - 16.0 g/dL    HCT 39.1 35.0 - 47.0 %    MCV 95.6 80.0 - 99.0 FL    MCH 31.1 26.0 - 34.0 PG    MCHC 32.5 30.0 - 36.5 g/dL    RDW 12.9 11.5 - 14.5 %    PLATELET 205 969 - 641 K/uL    MPV 10.3 8.9 - 12.9 FL    NRBC 0.0 0  WBC    ABSOLUTE NRBC 0.00 0.00 - 0.01 K/uL    NEUTROPHILS 65 32 - 75 %    LYMPHOCYTES 15 12 - 49 %    MONOCYTES 13 5 - 13 %    EOSINOPHILS 6 0 - 7 %    BASOPHILS 1 0 - 1 %    IMMATURE GRANULOCYTES 0 0.0 - 0.5 %    ABS. NEUTROPHILS 2.7 1.8 - 8.0 K/UL    ABS. LYMPHOCYTES 0.6 (L) 0.8 - 3.5 K/UL    ABS. MONOCYTES 0.5 0.0 - 1.0 K/UL    ABS. EOSINOPHILS 0.3 0.0 - 0.4 K/UL    ABS. BASOPHILS 0.1 0.0 - 0.1 K/UL    ABS. IMM.  GRANS. 0.0 0.00 - 0.04 K/UL    DF AUTOMATED     PHOSPHORUS    Collection Time: 01/13/20  5:20 AM   Result Value Ref Range    Phosphorus 3.2 2.6 - 4.7 MG/DL   MAGNESIUM    Collection Time: 01/13/20  5:20 AM   Result Value Ref Range    Magnesium 2.4 1.6 - 2.4 mg/dL   GLUCOSE, POC    Collection Time: 01/13/20  6:00 AM   Result Value Ref Range    Glucose (POC) 103 (H) 65 - 100 mg/dL    Performed by Leodan Kraft (CON)           Assessment:     Principal Problem:    [facility] (1/2/2020)    Active Problems:    Down's syndrome (1/2/2020)      Acquired hypothyroidism (1/2/2020)      Seizures ([facility] [address] (1/6/2020)        Plan:   1. Chronic subdural hematoma   - s/p crani 01/02   - neuro checks every 4 hours   - PT/OT/Speech as able   - Cont Keppra per neurology recs   - Will need restraints, sitter, and NG tube addressed before discharge. 2. Brain compression   - due to #1   - plans as above  3. Down's syndrome   - Sitter PRN   - Supportive care  4. Hypothyroidism    - cont levothyroxine from home  5. Seizures   - Neurology following   - Pt on Keppra 1000 mg bid. Vimpat weaned off. - Ativan PRN  6. Acute metabolic encephalopathy   - due to #5 and likely due to medications given for seizures   - plans as above   - slowly improving. Activity: up with assist  DVT ppx: SCDs  Dispo: tbd    Plan d/w Dr. Obdulio Lomas. No further surgical intervention at this time. Cont to be cautious with medications as she is a slow metabolizer and quite sensitive to them. Family requesting family help pt find new PCP. Will ask speech to come back by to re-eval pt for possible diet.       Chad Roldan, KEDAR

## 2020-01-13 NOTE — PROGRESS NOTES
1930:Bedside and Verbal shift change report given to Lola Nava RN (oncoming nurse) by Lucia Clark RN (offgoing nurse). Report included the following information SBAR, Kardex, Intake/Output, MAR, Accordion, Recent Results, Med Rec Status and Cardiac Rhythm NSR.     0415:TRANSFER - OUT REPORT:    Verbal report given to BARBARA Gallardo(name) on Markus Vieira  being transferred to [facility](unit) for routine progression of care       Report consisted of patients Situation, Background, Assessment and   Recommendations(SBAR). Information from the following report(s) SBAR, Kardex, Intake/Output, MAR, Accordion, Recent Results, Med Rec Status and Cardiac Rhythm NSR was reviewed with the receiving nurse. Lines:   PICC Triple Lumen 72/21/94 Right;Basilic (Active)   Central Line Being Utilized No 1/13/2020  4:00 AM   Criteria for Appropriate Use Limited/no vessel suitable for conventional peripheral access 1/13/2020  4:00 AM   Site Assessment Clean, dry, & intact 1/13/2020  4:00 AM   Phlebitis Assessment 0 1/13/2020  4:00 AM   Infiltration Assessment 0 1/13/2020  4:00 AM   Arm Circumference (cm) 27 cm 1/6/2020 10:46 AM   Date of Last Dressing Change 01/06/20 1/13/2020  4:00 AM   Dressing Status Clean, dry, & intact 1/13/2020  4:00 AM   External Catheter Length (cm) 1 centimeters 1/6/2020 10:46 AM   Dressing Type Bacteriocidal;Disk with Chlorhexadine gluconate (CHG); Transparent 1/13/2020  4:00 AM   Action Taken Open ports on tubing capped 1/13/2020  4:00 AM   Hub Color/Line Status Gray;Capped 1/13/2020  4:00 AM   Positive Blood Return (Site #1) Yes 1/13/2020  4:00 AM   Hub Color/Line Status Red;Capped 1/13/2020  4:00 AM   Positive Blood Return (Site #2) Yes 1/13/2020  4:00 AM   Hub Color/Line Status White;Capped 1/13/2020  4:00 AM   Positive Blood Return (Site #3) Yes 1/13/2020  4:00 AM   Alcohol Cap Used Yes 1/13/2020  4:00 AM        Opportunity for questions and clarification was provided.       Patient transported with:   Monitor  Registered Nurse  Tech

## 2020-01-13 NOTE — PROGRESS NOTES
01/13/20 0445   Vital Signs   Temp 98.8 °F (37.1 °C)   Temp Source Tympanic   Pulse (Heart Rate) 79   Heart Rate Source Monitor   Cardiac Rhythm NSR   Resp Rate 16   O2 Sat (%) 100 %   Level of Consciousness Alert   BP (!) 96/38   MAP (Calculated) (!) 57   MEWS Score 2     Patient arrived to [facility].

## 2020-01-13 NOTE — PROGRESS NOTES
JF:    -Ongoing medical evaluation/ work-up; Neuro/SLP following.   -Family requested to meet with CM to discuss discharge plan. Discharge plan unclear at this time.   -Palliative to consult; Awaiting recommendations. CM to follow.  ASHLEY Perez,CRM

## 2020-01-13 NOTE — PROGRESS NOTES
NUTRITION         Chart reviewed; discussed during interdisciplinary round. Ms Nori Willis is tolerating enteral feedingl at goal. Labs stable. Transferred out of ICU early this morning, has restraints & sitter for agitation & attempting to get out of bed. Palliative meeting planned with family. NG in place, they expect them to discuss need/desire for PEG placement. SLP would need new consult to determine if appropriate for therapy or evaluate for PO safety. RD to follow.     Aris Rogers, ABDI, MS, CDE

## 2020-01-14 NOTE — PROGRESS NOTES
Problem: Falls - Risk of 
Goal: *Absence of Falls Description Document Magalis Bunting Fall Risk and appropriate interventions in the flowsheet. Outcome: Progressing Towards Goal 
Note: Fall Risk Interventions: 
Mobility Interventions: Bed/chair exit alarm, Patient to call before getting OOB, PT Consult for mobility concerns Mentation Interventions: Adequate sleep, hydration, pain control, Bed/chair exit alarm, Increase mobility, More frequent rounding, Reorient patient, Update white board Medication Interventions: Evaluate medications/consider consulting pharmacy, Patient to call before getting OOB, Teach patient to arise slowly, Bed/chair exit alarm Elimination Interventions: Bed/chair exit alarm, Call light in reach, Patient to call for help with toileting needs, Toileting schedule/hourly rounds History of Falls Interventions: Bed/chair exit alarm, Consult care management for discharge planning Problem: Patient Education: Go to Patient Education Activity Goal: Patient/Family Education Outcome: Progressing Towards Goal 
  
Problem: Pressure Injury - Risk of 
Goal: *Prevention of pressure injury Description Document Yosef Scale and appropriate interventions in the flowsheet. Outcome: Progressing Towards Goal 
Note: Pressure Injury Interventions: 
Sensory Interventions: Assess changes in LOC, Check visual cues for pain, Discuss PT/OT consult with provider, Keep linens dry and wrinkle-free, Maintain/enhance activity level Moisture Interventions: Absorbent underpads, Maintain skin hydration (lotion/cream), Limit adult briefs, Internal/External urinary devices Activity Interventions: PT/OT evaluation, Pressure redistribution bed/mattress(bed type) Mobility Interventions: Pressure redistribution bed/mattress (bed type), PT/OT evaluation Nutrition Interventions: Document food/fluid/supplement intake Friction and Shear Interventions: Lift team/patient mobility team, Minimize layers, Apply protective barrier, creams and emollients Problem: Patient Education: Go to Patient Education Activity Goal: Patient/Family Education Outcome: Progressing Towards Goal 
  
Problem: Hemorrhagic Stroke: Discharge Outcomes Goal: *Optimal pain control at patient's stated goal 
Outcome: Progressing Towards Goal 
Goal: *Hemodynamically stable Outcome: Progressing Towards Goal 
Goal: *Tolerating diet Outcome: Progressing Towards Goal 
Goal: *Absence of signs and symptoms of DVT Outcome: Progressing Towards Goal 
Goal: *Progressive mobility and function Outcome: Progressing Towards Goal 
Goal: *Home safety concerns addressed Outcome: Progressing Towards Goal

## 2020-01-14 NOTE — PROGRESS NOTES
01/14/20 9613 Vital Signs Temp 98.2 °F (36.8 °C) Temp Source Axillary Pulse (Heart Rate) 72 Heart Rate Source Monitor Cardiac Rhythm NSR Resp Rate 8 O2 Sat (%) 98 % Level of Consciousness Alert BP 90/40 MAP (Calculated) (!) 57 BP 1 Method Automatic  
BP 1 Location Left arm BP Patient Position At rest  
MEWS Score 3 Alarms Set and Audible Cardiac alarms Box Number 212 Electrodes Replaced No  
Pain 1 Pain Scale 1 Adult Nonverbal Pain Scale Pain Intensity 1 0 Oxygen Therapy O2 Device Room air O2 Flow Rate (L/min) 2 l/min Patient Observation Special Appliances/Equipment Bed (comment) Repositioned Head of bed elevated (degrees) Patient Turned Turn on left side Observations Vitals/[id number] Ambulate No (Comment) Activity Family/Visitors present Mode of Transportation Stretcher MD aware of MEWS. Raudel Shetty

## 2020-01-14 NOTE — PROGRESS NOTES
SPEECH LANGUAGE PATHOLOGY DYSPHAGIA TREATMENT Patient: Kendra Grullon (30 y.o. female) Date: 1/14/2020 Diagnosis: [facility] [O43.2O9I] SDH (subdural hematoma) ([facility]) Procedure(s) (LRB): CRANIOTOMY LEFT FRONTAL FORSUBDURAL HEMATOMA (Left) 12 Days Post-Op Precautions: Aspiration ASSESSMENT: 
Patient presents with severe oral and suspected severe pharyngeal dysphagia characterized by absent bolus acceptance and significantly decreased recognition of food and drink. When ice chip placed in mouth, patient showed discomfort with no manipulation before allowing it to fall back out of her mouth. Pt did not accept puree by mouth despite maximal verbal and tactile cues. Wet vocal quality noted before and after treatment. Suspect pt is aspirating her own secretions. PLAN: 
Recommendations and Planned Interventions: 
-- Continue strict NPO. 
-- SLP to follow up for continued reassessment of swallow function as clinically indicated. --recommend continued discussions regarding goals of care in light of severity of dysphagia and overall medical condition Patient continues to benefit from skilled intervention to address the above impairments. Continue treatment per established plan of care. Discharge Recommendations: To Be Determined SUBJECTIVE:  
Patient appeared lethargic but opened eyes to acknowledge people in the room and to cuddle stuffed animal. Pt continuously groaned with wet vocal quality. OBJECTIVE:  
Cognitive and Communication Status: 
Neurologic State: Lethargic, Eyes open to voice, Drowsy Orientation Level: Unable to verbalize Cognition: No command following Perception: Appears intact Perseveration: Perseverates during mobility(on keeping backside covered) Safety/Judgement: Decreased awareness of environment Dysphagia Treatment: 
Oral Assessment: P.O. Trials: 
Patient Position: Upright in bed Vocal quality prior to P.O.: Wet Consistency Presented: Ice chips;Puree How Presented: SLP-fed/presented;Spoon Bolus Acceptance: Absent Bolus Formation/Control: Impaired Comments: Pt did not accept anything by mouth Oral Phase Severity: Severe Pharyngeal Phase Severity : Severe (based on poor secretion management) Pain: 
Pain Scale 1: Adult Nonverbal Pain Scale Pain Intensity 1: 0 After treatment:  
Patient left in no apparent distress in bed, Call bell within reach, Nursing notified, and Caregiver / family present COMMUNICATION/EDUCATION:  
 
The patient's plan of care including recommendations, planned interventions, and recommended diet changes were discussed with: Registered Nurse. Jerome Lawton., Student SLP Time Calculation: 20 mins Regarding student involvement in patient care: A student participated in this treatment session. Per CMS Medicare statements and ABDI guidelines I certify that the following was true: 1. I was present and directly observed the entire session. 2. I made all skilled judgments and clinical decisions regarding care. 3. I am the practitioner responsible for assessment, treatment, and documentation.

## 2020-01-14 NOTE — PROGRESS NOTES
NUTRITION FOLLOW UP  
ASSESSMENT 
 
RECOMMENDATIONS:  
Continue TF: 
   Osmolite 1.5 @ 45ml/hr x 21 hr with 1 packet Prosource daily 120 ml water flush q 4 hr  
    Hold tube feeding 1 hr before and 2 hr after Synthroid-once changed to the oral route) Continue 1 Prosource Packet daily If PEG is placed: once ready for use, TF can be scheduled to bolus feeds,  
· suggest: 240mL of Osmolite 1.5  4x daily, flush with 90 mL before and after each bolus · Begin with 50ml at first bolus feed, increase by 50mL with each feed until goal, same flush Interventions/Plan:  
Food/Nutrient Delivery: Modify rate, concentration, composition, and schedule- consider bolus feeds if PEG placed as above Provides: 960mL of TF, 1440 kcal/d, 1450ml of free water, 76g Protein Assessment:  
Reason for Assessment:  
[x] Reassessment Provider Consult-Tube Feeding management Diet: NPO Nutritionally Significant Medications: [x] Reviewed & Includes: Synthroid (IV), KCL, Meal Intake: No data found. Subjective: 
Unresponsive. Family meeting with palliative Objective: Ms Mauricio Benavides was admitted with SDH. PMHx: Down's syndrome, hypothyroidism. Noted: Chronic SDH-1/2 (L) craniotomy for evacuation of hematoma. Patient appears well-nourished; MST negative on admission. Dysphagia [facility] altered diet ordered 1/3 per SLP (oropharyngeal dysphagia) however patient now NPO d/t AMS. Plan discussed during rounds to await palliative care meeting with family for long term nutrition support needs. DH in place, pt is often trying to pull at [facility] and [facility]. SLP attempted PO with patient and did not tolerate applesauce. Wt stable this week. Current TF: Osmolite 1.5 formula (non-fiber): 45 ml/hr x 21 hr with 2 packets Prosource daily and 120 ml water flush q 4 hr. Provides 945 ml, 1540 calories, 89 gm protein and 1560 ml free water (tube feeding/flush) per day.  Will hold tube feeding 1 hr before and 2 hr after Synthroid administration. Estimated Nutrition Needs:  
Kcals/day: 1550 Kcals/day(25 kcal/kg) Protein: 74 g(74-87 (1.2-1.4g/kg) Fluid: (1 ml/kcal) Based On: Kcal/kg - specify (Comment) Weight Used: Actual wt(62 kg) Pt expected to meet estimated nutrient needs:  [x]   Yes via enteral feeding   []  No  [] Unable to predict at this time Nutrition Diagnosis: 1. Swallowing difficulty related to SDH as evidenced by NPO, not safe for PO per SLP, DH in place for nutrition support, await plan of care for long term nutrition Goals:   
 tolerate DH feedings at goal daily for the following 3-5 days Monitoring & Evaluation: - Enteral/parenteral nutrition intake - Electrolyte and renal profile, Weight/weight change Previous Nutrition Goals Met: 
Progressing Previous Recommendations:     
Progressing Education & Discharge Needs: 
 [x] None Identified 
 [] Identified and addressed [x] Participated in care plan, discharge planning, and/or interdisciplinary rounds - await plan of care for long-term nutrition support Cultural, Gnosticism and ethnic food preferences identified: 
 None Skin Integrity: []Intact  [x] surgical incision Edema: [x]None []Other Last BM: 1/12 Food Allergies: [x]None []Other Anthropometrics:   
Weight Loss Metrics 1/14/2020 1/2/2020 1/2/2020 1/2/2020 4/28/2018 2/24/2018 Today's Wt 132 lb 11.2 oz - 145 lb 8.1 oz - 150 lb 155 lb 13.8 oz BMI - 33.21 kg/m2 - 36.42 kg/m2 37.54 kg/m2 39.01 kg/m2 Last 3 Recorded Weights in this Encounter 01/12/20 0400 01/13/20 1242 01/14/20 0154 Weight: 58.6 kg (129 lb 3 oz) 60.1 kg (132 lb 9.6 oz) 60.2 kg (132 lb 11.2 oz) Weight Source: Bed Height: 4' 5\" (134.6 cm), Body mass index is 33.21 kg/m². Labs:   
Lab Results Component Value Date/Time  Sodium 136 01/14/2020 02:56 AM  
 Potassium 4.0 01/14/2020 02:56 AM  
 Chloride 100 01/14/2020 02:56 AM  
 CO2 31 01/14/2020 02:56 AM  
 Glucose 130 (H) 01/14/2020 02:56 AM  
 BUN 16 01/14/2020 02:56 AM  
 Creatinine 0.66 01/14/2020 02:56 AM  
 Calcium 8.8 01/14/2020 02:56 AM  
 Magnesium 2.4 01/14/2020 02:56 AM  
 Phosphorus 3.2 01/14/2020 02:56 AM  
 Albumin 2.5 (L) 01/05/2020 12:21 AM  
 
No results found for: HBA1C, HGBE8, BBI0SKDH, HOU7EHYY, ADK2TDIL Lab Results Component Value Date/Time Glucose (POC) 99 01/06/2020 03:07 AM  
  
Lab Results Component Value Date/Time ALT (SGPT) 14 01/05/2020 12:21 AM  
 AST (SGOT) 21 01/05/2020 12:21 AM  
 Alk.  phosphatase 83 01/05/2020 12:21 AM  
 Bilirubin, total 0.6 01/05/2020 12:21 AM  
  
 
hCriss Diallo, RD, MS, CDE

## 2020-01-14 NOTE — ADVANCED PRACTICE NURSE
Bedside shift change report given to Tiffanie Starr (oncoming nurse) by Roseline Guillaume (offgoing nurse). Report included the following information SBAR, Kardex, Recent Results and Cardiac Rhythm NSR.

## 2020-01-14 NOTE — PROGRESS NOTES
PAtient still requiring restraints to prevent interfering with medical care. Tolerating tube feeds. Vital signs stable.
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
## 2020-01-14 NOTE — PROGRESS NOTES
Hospitalist Progress Note Bello Henderson MD 
Answering service: [phone] OR [phone] from in house phone Date of Service:  2020 NAME:  Arya Petit :  1955 MRN:  [id number] PCP: Niki Strong NP Chief Complaint: SDH Admission Summary:  
 
Arya Petit is a 71 y.o. female who presented with fall/SDH Interval history / Subjective:  
 
Patient seen and examined by the bedside. No family present. Patient is alert but not oriented. In restraints. Discussed with nursing. Plan for family meeting with palliatve today Assessment & Plan: #. Chronic subdural hematoma with midline shift status post craniotomy 2020 
 repeat CT scans reviewed, improving, weaning AED, on Keppra for now for seizure prophylaxis. Neurosurgery and neurology on board. PT/OT/speech as able, frequent neuro checks 
  
#.  Metabolic encephalopathy, acute on chronic 
- due to delirium and subdural hematoma - Slow improvement, continue sitter,  continue soft restaints 
- Continue Seroquel  ( dose adjusted per family), trazodone and Haldol PRN 
  
#.  Seizures reported as facial twitching 
- Had 24-hour EEG completed 1/10/2020 which continues to show underlying toxic metabolic abnormality 
- neurology re consulted for increased lethargy and mood changes - Malen Sequoyah will be transitioned to Depakote per neuro  
  
#. Down syndromesupportive care 
  
#. Dysphagia, due to acute encephalopathy 
- Dobbhoff in placecontinue tube feeds - SLP eval noted. Patient was aspirating on all types of food. - not a candidate for PEG and family in agreement  
  
#. Hypothyroidismcontinue Synthroid #. Given multitude of problems, worsening dysphagia, encephalopathy, palliative care consulted Family meeting held today by palliative and pending decision regarding hospice care Code status: Full Code DVT prophylaxis: SCDs Care Plan discussed with: Patient/Family and Nurse Disposition: TBD home with hospice care ? Hospital Problems  Date Reviewed: 2020 Codes Class Noted POA Seizures ([facility].) ICD-10-CM: R56.9 ICD-9-CM: 780.39  2020 Unknown * (Principal) SDH (subdural hematoma) ([facility].) ICD-10-CM: X58.2Y6Q 
ICD-9-CM: 432.1  2020 Yes Down's syndrome (Chronic) ICD-10-CM: Q90.9 ICD-9-CM: 758.0  2020 Yes Acquired hypothyroidism (Chronic) ICD-10-CM: E03.9 ICD-9-CM: 244.9  2020 Yes Review of Systems:  
Review of systems not obtained due to patient factors. ecephalopathy Physical Examination:  
 
General appearance:  uncooperative, mild distress, appears older than stated age Head: left side craniotomy wound/staples in place, no drainage/bleeding Eyes: conjunctivae/corneas clear. Oral mucosa dry, Dobbhoff in place Lungs: CTA BL, nW nR Heart: RRR, NM Neurologic: awake, confused, does not follow commands. Vital Signs:  
 Last 24hrs VS reviewed since prior progress note. Most recent are: 
 
Visit Vitals BP 96/54 (BP 1 Location: Left arm, BP Patient Position: At rest) Pulse 80 Temp 98.5 °F (36.9 °C) Resp 20 Ht 4' 5\" (1.346 m) Wt 60.2 kg (132 lb 11.2 oz) SpO2 98% BMI 33.21 kg/m² Intake/Output Summary (Last 24 hours) at 2020 1447 Last data filed at 2020 1200 Gross per 24 hour Intake 743 ml Output 925 ml Net -182 ml Tmax:  Temp (24hrs), Av.3 °F (36.8 °C), Min:98 °F (36.7 °C), Max:98.6 °F (37 °C) Data Review:  
Data reviewed by myself: Xr Abd ([place]) Result Date: 2020 CLINICAL HISTORY: Evaluate NG tube/OG tube INDICATION: Evaluate NG/OG tube COMPARISON: None FINDINGS: AP Limited supine view of the abdomen is obtained. The OG/NG tube tip lies in appropriate position below the diaphragm. Otherwise no significant interval change or acute findings. Cardiac megaly. Basilar atelectasis. Patient is on a cardiac monitor. PICC line catheter on the right. IMPRESSION: Gastric tube tip is in appropriate position for use. Ct Head Wo Cont Result Date: 1/8/2020 EXAM: CT HEAD WO CONT INDICATION: AMS, L SDH COMPARISON: None. CONTRAST: None. TECHNIQUE: Unenhanced CT of the head was performed using 5 mm images. Brain and bone windows were generated. CT dose reduction was achieved through use of a standardized protocol tailored for this examination and automatic exposure control for dose modulation. FINDINGS: There is persistent shift of the midline to the right by 2.5 mm, previously 3.1 mm. There is mild hydrocephalus which is stable. There is no significant white matter disease. The fluid collection adjacent to the right frontal lobe measures 10.8 mm and previously measured 11.7 mm. The mixed subdural hematoma adjacent to left frontal lobe measures 13.1 mm and previously measured 12.9 mm. The mixed subdural hematoma adjacent to the left parietal lobe measures 12.2 mm and previously measured 12.0 mm. Hematoma adjacent to left frontal lobe measures 9.5 mm and previously measured 9.3 mm. The basilar cisterns are open. No acute infarct is identified. The bone windows demonstrate craniectomy. The visualized portions of the paranasal sinuses and mastoid air cells are clear. IMPRESSION: Interval slight improvement in midline shift. Ct Head Wo Cont Result Date: 1/7/2020 EXAM: CT HEAD WO CONT INDICATION: assess bleed COMPARISON: January 4. CONTRAST: None. TECHNIQUE: Unenhanced CT of the head was performed using 5 mm images. Brain and bone windows were generated. CT dose reduction was achieved through use of a standardized protocol tailored for this examination and automatic exposure control for dose modulation. FINDINGS: There is slight shift of the midline to the right of 3.1 mm, previously 2.0 mm. There is no significant white matter disease.  The left subdural hematoma measures 14.7 mm, previously 13.4 mm adjacent to left frontal lobe and 9.3 mm adjacent to the left parietal lobe, previously 8.3 mm. The fluid collection adjacent to the right frontal lobe measures 11.7 mm, previously 11.4 mm. The drainage catheter has been removed in the interval. The basilar cisterns are open. No acute infarct is identified. The bone windows demonstrate craniotomy. There is fluid in the left ethmoid air cells and mucosal thickening of the maxillary sinus. IMPRESSION: Slight interval increase in left subdural hematoma status post removal of drainage catheter, with slight increase of midline shift to the right. Ct Head Wo Cont Result Date: 1/4/2020 EXAM:  CT HEAD WO CONT INDICATION:   post op craniotomy COMPARISON: CT head 1/30/2020. TECHNIQUE: Unenhanced CT of the head was performed using 5 mm images. Brain and bone windows were generated. CT dose reduction was achieved through use of a standardized protocol tailored for this examination and automatic exposure control for dose modulation. FINDINGS: Stable postsurgical changes of left frontal craniotomy and chronic subdural hematoma evacuation, with a left-sided subdural drain in place. No significant change in size of mixed density left cerebral convexity subdural hematoma with acute hyperdense components, measuring up to 11 mm in maximal thickness. Scattered left cerebral pneumocephalus is unchanged. There is unchanged minimal 2 mm of rightward midline shift. There is an unchanged chronic right subdural hematoma measuring up to 11 mm in maximal thickness. No acute hyperdense component is identified within this subdural hematoma. The ventricles are normal in size and position. Basilar cisterns are patent. No evidence of acute infarct. The paranasal sinuses, mastoid air cells, and middle ears are clear. Unchanged medially deviated right globe. IMPRESSION: 1.  Expected postoperative changes of left subdural hematoma evacuation as above, with no significant change in size of mixed density left subdural hematoma with acute hyperdense components. Stable chronic right subdural hematoma. Stable minimal 2 mm of rightward midline shift. Ct Head Wo Cont Result Date: 1/3/2020 EXAM:  CT HEAD WO CONT INDICATION:  assess subdural hematoma post drainage. TECHNIQUE: Thin axial images were obtained through the calvarium and saved with standard and bone window algorithm. Coronal and sagittal reconstructions were generated. CT dose reduction was achieved through use of a standardized protocol tailored for this examination and automatic exposure control for dose modulation. COMPARISON: CT head yesterday. Previous CT head 11/20/18 FINDINGS: Since the prior CT patient is undergone a left frontal craniotomy for treatment of large chronic primarily left-sided multiloculated subdural hematoma which had significant mass effect, developing herniation as well as developing right-sided hydrocephalus. Postoperative findings are demonstrated with subdural drain in place. Residual extra-axial collection on the left has higher density consistent with more acute blood was some gas/air. Some root persistent effacement of left convexity sulci. This represents a marked interval improvement from the preoperative exam and there has been near complete return of central structures to the midline. There is a chronic right subdural hematoma with some effacement of the sulci. This appears somewhat more prominent on today's exam, however it is likely in part related to interval decompression of the brain and expansion causing increased constant acuity of the right sided subdural hematoma over the frontal and temporal convexity. Small amount of high density blood in the dependent posterior right subdural similar to the preoperative exam with no interval new hemorrhage demonstrated. Otherwise, cerebral density is within normal limits with no evidence of acute brain infarction. IMPRESSION: 1. Interval left frontal craniotomy and drainage of large left multiloculated chronic subdural hematoma. 2. Postoperative findings on the left as described above. 3. Residual right frontal/temporal convexity chronic subdural hematoma without definite interval change. 4. Near complete return of central structures to the midline. Ct Head Wo Cont Result Date: 1/2/2020 EXAM:  CT HEAD WO CONT INDICATION:   ams COMPARISON: CT head 1/2/2020. TECHNIQUE: Unenhanced CT of the head was performed using 5 mm images. Brain and bone windows were generated. CT dose reduction was achieved through use of a standardized protocol tailored for this examination and automatic exposure control for dose modulation. FINDINGS: Predominantly hypodense to the left cerebral convexity subdural fluid collection measuring up to 2.0 cm in maximal thickness. There are hyperdense septations noted within the fluid collection, indicating a chronic component. No other obvious hyperdense components are identified. There is mass effect on the adjacent brain parenchyma, and resultant 10 mm of rightward midline shift. There is also descending transtentorial herniation with effacement of the suprasellar cistern. There is a small right cerebral convexity hypodense subdural fluid collection which measures up to 8 mm in maximal thickness along the frontal convexity. There is mild local mass effect on the adjacent brain parenchyma. There is no acute infarct. The paranasal sinuses, mastoid air cells, and middle ears are clear. The orbital contents are within normal limits. Disconjugate gaze with medial deviation of the right globe. There are no significant osseous or extracranial soft tissue lesions. IMPRESSION: 1. Predominantly hypodense left cerebral convexity subdural fluid collection with hyperdense membranes, most consistent with a chronic subdural hematoma.  Mass effect results in 10 mm of rightward midline shift and descending transtentorial herniation with effacement of the suprasellar cistern. No large acute component is identified within the subdural hematoma. 2. Additional small right cerebral convexity hypodense subdural fluid collection measuring up to 8 mm in maximal thickness, most consistent with an additional chronic subdural hematoma. The findings were called to Tia Junes on 1/2/2020 at 4:11 PM by Dr. Darrow Frankel. [facility] 128 Xr Chest [facility] Result Date: 1/6/2020 EXAM:  XR CHEST PORT INDICATION: Hypoxia COMPARISON: None TECHNIQUE: Portable AP semiupright chest view at 0833 hours FINDINGS: Cardiac monitoring wires overlie the thorax. There is moderate cardiac silhouette enlargement. There are mild diffuse interstitial opacities with a basilar predominance. There is no pleural effusion or pneumothorax. The visualized bones and upper abdomen are age-appropriate. IMPRESSION: Mild diffuse interstitial opacities with a basilar predominance suggesting pulmonary edema. Moderate cardiac silhouette enlargement. Cta Head Neck W Cont Result Date: 1/2/2020 EXAM:  CTA HEAD NECK W CONT INDICATION:   altered mental status, limp, slumping over, right eye deviated inward. Limited exam due to Down's Syndrome COMPARISON:  CT head 1/2/2020. CONTRAST:  60 mL of Isovue-370. TECHNIQUE:  Unenhanced  images were obtained to localize the volume for acquisition. Multislice helical axial CT angiography was performed from the aortic arch to the top of the head during uneventful rapid bolus intravenous contrast administration. Coronal and sagittal reformations and 3D post processing was performed. CT dose reduction was achieved through use of a standardized protocol tailored for this examination and automatic exposure control for dose modulation.  FINDINGS: CTA Head: There is no evidence of large vessel occlusion or flow-limiting stenosis of the intracranial internal carotid, anterior cerebral, and middle cerebral arteries. The anterior communicating artery is patent. There is no evidence of large vessel occlusion or flow-limiting stenosis of the intracranial vertebral arteries, basilar artery, or posterior cerebral arteries. Diminutive basilar artery on a congenital basis, with fetal origin of the bilateral posterior cerebral arteries. There is no evidence of aneurysm or vascular malformation. The dural venous sinuses and deep cerebral venous system are patent. No evidence of abnormal parenchymal enhancement on delayed phase images. Redemonstrated bilateral chronic subdural hematomas, left larger than right, with rightward midline shift and descending transtentorial herniation. CTA NECK: NASCET method was utilized for calculating stenosis. The aortic arch is unremarkable. The common carotid arteries are normal in course and caliber bilaterally. There is no evidence of significant stenosis in the cervical right internal carotid artery. There is no evidence of significant stenosis in the cervical left internal carotid artery. There is a codominant vertebrobasilar arterial system. The vertebral arteries and imaged portion of the basilar artery are normal in course, size and contour without significant stenosis. Visualized soft tissues of the neck are unremarkable. Atrophic thyroid gland. Visualized lung apices are clear. The proximal thoracic esophagus is dilated and full of debris. No acute fracture or aggressive osseous lesion. Scoliosis partially visualized. Multilevel degenerative disc disease in the cervical spine most advanced at C3-C4 and C5-C6. Multilevel facet arthropathy throughout the cervical spine. IMPRESSION: CTA Head: 1. No evidence of significant stenosis or aneurysm. 2. Redemonstrated bilateral chronic subdural hematomas, left larger than right, with rightward midline shift and descending transtentorial herniation. CTA Neck: 1. No evidence of significant stenosis.   
 
No results found for: SDES 
 Lab Results Component Value Date/Time Culture result: MIXED UROGENITAL SKYLA ISOLATED 02/24/2018 04:51 PM  
 
All Micro Results None Labs: reviewed by myself. Recent Labs  
  01/14/20 
0256 01/13/20 
0520 WBC 4.5 4.2 HGB 13.3 12.7 HCT 40.0 39.1  305 Recent Labs  
  01/14/20 
0256 01/13/20 
0520 01/12/20 
0417  137 138  
K 4.0 3.9 4.3  100 102 CO2 31 32 34* BUN 16 14 13 CREA 0.66 0.60 0.65 * 115* 112* CA 8.8 8.3* 8.5 MG 2.4 2.4 2.6* PHOS 3.2 3.2 3.5 No results for input(s): SGOT, GPT, ALT, AP, TBIL, TBILI, TP, ALB, GLOB, GGT, AML, LPSE in the last 72 hours. No lab exists for component: AMYP, HLPSE No results for input(s): INR, PTP, APTT, INREXT, INREXT in the last 72 hours. No results for input(s): FE, TIBC, PSAT, FERR in the last 72 hours. No results found for: FOL, RBCF No results for input(s): PH, PCO2, PO2 in the last 72 hours. No results for input(s): CPK, CKNDX, TROIQ in the last 72 hours. No lab exists for component: CPKMB Lab Results Component Value Date/Time Cholesterol, total 200 (H) 11/02/2009 11:48 AM  
 HDL Cholesterol 65 (H) 11/02/2009 11:48 AM  
 LDL, calculated 108 (H) 11/02/2009 11:48 AM  
 Triglyceride 135 11/02/2009 11:48 AM  
 CHOL/HDL Ratio 3.1 11/02/2009 11:48 AM  
 
Lab Results Component Value Date/Time Glucose (POC) 127 (H) 01/14/2020 11:47 AM  
 Glucose (POC) 113 (H) 01/14/2020 05:42 AM  
 Glucose (POC) 101 (H) 01/13/2020 06:12 PM  
 Glucose (POC) 105 (H) 01/13/2020 11:39 AM  
 Glucose (POC) 103 (H) 01/13/2020 06:00 AM  
 
Lab Results Component Value Date/Time  Color YELLOW/STRAW 01/02/2020 02:31 PM  
 Appearance CLEAR 01/02/2020 02:31 PM  
 Specific gravity 1.020 01/02/2020 02:31 PM  
 pH (UA) 7.0 01/02/2020 02:31 PM  
 Protein NEGATIVE  01/02/2020 02:31 PM  
 Glucose NEGATIVE  01/02/2020 02:31 PM  
 Ketone NEGATIVE  01/02/2020 02:31 PM  
 Bilirubin NEGATIVE  01/02/2020 02:31 PM  
 Urobilinogen 1.0 01/02/2020 02:31 PM  
 Nitrites NEGATIVE  01/02/2020 02:31 PM  
 Leukocyte Esterase NEGATIVE  01/02/2020 02:31 PM  
 Epithelial cells FEW 01/02/2020 02:31 PM  
 Bacteria NEGATIVE  01/02/2020 02:31 PM  
 WBC 0-4 01/02/2020 02:31 PM  
 RBC 0-5 01/02/2020 02:31 PM  
 
 
 
Medications Reviewed:  
 
Current Facility-Administered Medications Medication Dose Route Frequency  QUEtiapine (SEROquel) tablet 25 mg  25 mg Oral QHS  [START ON 1/15/2020] QUEtiapine (SEROquel) tablet 12.5 mg  12.5 mg Oral DAILY  valproic acid (as sodium salt) (DEPAKENE) 250 mg/5 mL (5 mL) oral solution 500 mg  500 mg Oral Q8H  
 traZODone (DESYREL) tablet 50 mg  50 mg Oral QHS  levETIRAcetam (KEPPRA) oral solution 1,000 mg  1,000 mg Per NG tube Q12H  
 bisacodyl (DULCOLAX) suppository 10 mg  10 mg Rectal DAILY PRN  
 senna (SENOKOT) tablet 17.2 mg  2 Tab Oral DAILY  docusate (COLACE) 50 mg/5 mL oral liquid 100 mg  100 mg Oral DAILY  famotidine (PEPCID) 40 mg/5 mL (8 mg/mL) oral suspension 20 mg  20 mg Per NG tube BID  levothyroxine (SYNTHROID) tablet 75 mcg  75 mcg Per NG tube DAILY  acetaminophen (TYLENOL) suppository 650 mg  650 mg Rectal Q4H PRN  
 dextrose 10 % infusion 125-250 mL  125-250 mL IntraVENous PRN  
 miconazole (MICOTIN) 2 % powder   Topical BID  sodium chloride (NS) flush 5-40 mL  5-40 mL IntraVENous Q8H  
 sodium chloride (NS) flush 5-40 mL  5-40 mL IntraVENous PRN  
 acetaminophen (TYLENOL) solution 650 mg  650 mg Oral Q4H PRN  
 ondansetron (ZOFRAN) injection 4 mg  4 mg IntraVENous Q4H PRN  
 sodium chloride (NS) flush 5-40 mL  5-40 mL IntraVENous Q8H  
 sodium chloride (NS) flush 5-40 mL  5-40 mL IntraVENous PRN  
 naloxone (NARCAN) injection 0.4 mg  0.4 mg IntraVENous PRN  
 
______________________________________________________________________ EXPECTED LENGTH OF STAY: 6d 16h ACTUAL LENGTH OF STAY:          12 Mindy Dorman MD  
 
 Patient's emergency contacts: 
Extended Emergency Contact Information Primary Emergency Contact: Samuel Castro Address: [address] [facility] [address] [facility] [address], [address] [place] [address] Home Phone: [phone] Relation: Brother Secondary Emergency Contact: SYMONE CASTRO Home Phone: [phone] Relation: Other Relative

## 2020-01-14 NOTE — PROGRESS NOTES
Bedside and Verbal shift change report given to 1200 Yasir Stephenson (oncoming nurse) by [facility] (offgoing nurse). Report included the following information SBAR, Kardex, Recent Results and Dual Neuro Assessment.

## 2020-01-14 NOTE — CONSULTS
Palliative Medicine Consult  [place]: [phone])    Patient Name: Caridad Fitzpatrick  YOB: 1955    Date of Initial Consult: Jan 14, 2020  Reason for Consult: Care Decisions  Requesting Provider: Dr. Ella Mcbride  Primary Care Physician: Christelle Hager NP     SUMMARY:   Caridad Fitzpatrick is a 71 y.o. with a past history of down syndrome, chronic SDH, frequent falls, anxiety, hypothyroidism, who was admitted on 1/2/2020 from home due to [address] [place] [address] with a diagnosis of SDH now s/p craniotomy left frontal SDH post op day 12, brain compression, seizures, AMS, feeding difficutlies. .   Challenges with agitation and feeding. Pt is grossly aspirating and a peg tube is the only option for long term nutrition at this time. Reports of possible seizures noted. Meds given for agitation and seizures which lead to sedation    Current medical issues leading to Palliative Medicine involvement include: support with care decisions given pt unable to tolerate oral.  She has been in restraints since surgery. High risk of dislodging peg if placed. Family would like to care for the pt at home. PALLIATIVE DIAGNOSES:   1. Agitation  2. Feeding difficulties  3. Aspiration Risk  4. Fall risk     PLAN:   1. Family meeting held with brother~Samuel and sister in law~Lona  2. Services introduced and support provided  3. We reviewed the patient's medical condition in detail and family has a good understanding   4. Inquired about family's goal for the pt. Brother would like to take the pt home and continue to care for her. Brother does not favor peg placement. He understands that her life is limited. Family says a facility is not an option as she stayed in [facility] before and says the care was awful. They feel that the stay in the facility was the beginning of her decline. 5. I explained that home is an option provided the appropriate resources are in place  6.  Explained that comfort feeds would be an option but understanding the risk of pneumonia is high. If she should develop pna then we would treat the s/s rather than admitting the pt for acute management   7. Explained to the family that the pt will need 24 hour care and there is some concern of the brother's recent back surgery  8. Family says the pt has caregivers all day and family can cover the night  9. Explained the services that hospice can provide. They are familiar with hospice care and are receptve to the idea  10. They would like to discuss further with the family before making any decisions  11. We have scheduled a follow up visit tomorrow around 1130am.  12. Initial consult note routed to primary continuity provider and/or primary health care team members  15. Communicated plan of care with: Palliative IDTJunior 192 Team     GOALS OF CARE / TREATMENT PREFERENCES:     GOALS OF CARE:  Patient/Health Care Proxy Stated Goals: (TBD)    TREATMENT PREFERENCES:   Code Status: Full Code    Advance Care Planning:  [x] [facility] Interdisciplinary Team has updated the ACP Navigator with [place] and Patient Capacity     Primary Decision Maker (Active): Nikole Rivers - [phone]      Advance Care Planning 1/4/2020   Confirm Advance Directive None       Medical Interventions: Other (comment)     Other Instructions:   Artificially Administered Nutrition: No feeding tube     Other:    As far as possible, the palliative care team has discussed with patient / health care proxy about goals of care / treatment preferences for patient.      HISTORY:     History obtained from: chart, family, team    CHIEF COMPLAINT: pt admitted with aforementioned history and issues    HPI/SUBJECTIVE:    The patient is:   [] Verbal and participatory  [x] Non-participatory due to:   Medical condition     Clinical Pain Assessment (nonverbal scale for severity on nonverbal patients):   Clinical Pain Assessment  Severity: 0     Activity (Movement): Lying quietly, normal position    Duration: for how long has pt been experiencing pain (e.g., 2 days, 1 month, years)  Frequency: how often pain is an issue (e.g., several times per day, once every few days, constant)     FUNCTIONAL ASSESSMENT:     Palliative Performance Scale (PPS):  PPS: 30       PSYCHOSOCIAL/SPIRITUAL SCREENING:     Palliative IDT has assessed this patient for cultural preferences / practices and a referral made as appropriate to needs (Cultural Services, Patient Advocacy, Ethics, etc.)    Any spiritual / Gnosticist concerns:  [] Yes /  [x] No    Caregiver Burnout:  [] Yes /  [x] No /  [] No Caregiver Present      Anticipatory grief assessment:   [x] Normal  / [] Maladaptive       ESAS Anxiety: Anxiety: 0    ESAS Depression:          REVIEW OF SYSTEMS:     Positive and pertinent negative findings in ROS are noted above in HPI. The following systems were [x] reviewed objectively / [] unable to be reviewed as noted in HPI  Other findings are noted below. Systems: constitutional, ears/nose/mouth/throat, respiratory, gastrointestinal, genitourinary, musculoskeletal, integumentary, neurologic, psychiatric, endocrine. Positive findings noted below. Modified ESAS Completed by: provider   Fatigue: 10       Pain: 0   Anxiety: 0     Anorexia: 10 Dyspnea: 0           Stool Occurrence(s): 1        PHYSICAL EXAM:     From RN flowsheet:  Wt Readings from Last 3 Encounters:   01/14/20 132 lb 11.2 oz (60.2 kg)   01/02/20 145 lb 8.1 oz (66 kg)   04/28/18 150 lb (68 kg)     Blood pressure 96/54, pulse 80, temperature 98.5 °F (36.9 °C), resp. rate 20, height 4' 5\" (1.346 m), weight 132 lb 11.2 oz (60.2 kg), SpO2 98 %.     Pain Scale 1: Adult Nonverbal Pain Scale  Pain Intensity 1: 0                 Last bowel movement, if known:     Constitutional: frail, sedated, nad  Eyes: anicteric  ENMT: no nasal discharge, moist mucous membranes  Cardiovascular: regular rhythm, distal pulses intact  Respiratory: breathing not labored, symmetric  Gastrointestinal: soft non-tender, +bowel sounds  Musculoskeletal:  no tenderness to palpation  Skin: warm, dry  Neurologic: Downs Syndrome  Psychiatric: agitation  Other:       HISTORY:     Principal Problem:    SDH (subdural hematoma) (HCC) (1/2/2020)    Active Problems:    Down's syndrome (1/2/2020)      Acquired hypothyroidism (1/2/2020)      Seizures ([address] (1/6/2020)      Past Medical History:   Diagnosis Date    Endocrine disease     hypothyroidism    Psychiatric disorder     Down Syndrome      Past Surgical History:   Procedure Laterality Date    HX CHOLECYSTECTOMY        History reviewed. No pertinent family history. History reviewed, no pertinent family history.   Social History     Tobacco Use    Smoking status: Never Smoker    Smokeless tobacco: Never Used   Substance Use Topics    Alcohol use: No     No Known Allergies   Current Facility-Administered Medications   Medication Dose Route Frequency    QUEtiapine (SEROquel) tablet 25 mg  25 mg Oral QHS    [START ON 1/15/2020] QUEtiapine (SEROquel) tablet 12.5 mg  12.5 mg Oral DAILY    valproic acid (as sodium salt) (DEPAKENE) 250 mg/5 mL (5 mL) oral solution 500 mg  500 mg Oral Q8H    traZODone (DESYREL) tablet 50 mg  50 mg Oral QHS    levETIRAcetam (KEPPRA) oral solution 1,000 mg  1,000 mg Per NG tube Q12H    bisacodyl (DULCOLAX) suppository 10 mg  10 mg Rectal DAILY PRN    senna (SENOKOT) tablet 17.2 mg  2 Tab Oral DAILY    docusate (COLACE) 50 mg/5 mL oral liquid 100 mg  100 mg Oral DAILY    famotidine (PEPCID) 40 mg/5 mL (8 mg/mL) oral suspension 20 mg  20 mg Per NG tube BID    levothyroxine (SYNTHROID) tablet 75 mcg  75 mcg Per NG tube DAILY    acetaminophen (TYLENOL) suppository 650 mg  650 mg Rectal Q4H PRN    dextrose 10 % infusion 125-250 mL  125-250 mL IntraVENous PRN    miconazole (MICOTIN) 2 % powder   Topical BID    sodium chloride (NS) flush 5-40 mL  5-40 mL IntraVENous Q8H    sodium chloride (NS) flush 5-40 mL 5-40 mL IntraVENous PRN    acetaminophen (TYLENOL) solution 650 mg  650 mg Oral Q4H PRN    ondansetron (ZOFRAN) injection 4 mg  4 mg IntraVENous Q4H PRN    sodium chloride (NS) flush 5-40 mL  5-40 mL IntraVENous Q8H    sodium chloride (NS) flush 5-40 mL  5-40 mL IntraVENous PRN    naloxone (NARCAN) injection 0.4 mg  0.4 mg IntraVENous PRN          LAB AND IMAGING FINDINGS:     Lab Results   Component Value Date/Time    WBC 4.5 01/14/2020 02:56 AM    HGB 13.3 01/14/2020 02:56 AM    PLATELET 134 20/79/5624 02:56 AM     Lab Results   Component Value Date/Time    Sodium 136 01/14/2020 02:56 AM    Potassium 4.0 01/14/2020 02:56 AM    Chloride 100 01/14/2020 02:56 AM    CO2 31 01/14/2020 02:56 AM    BUN 16 01/14/2020 02:56 AM    Creatinine 0.66 01/14/2020 02:56 AM    Calcium 8.8 01/14/2020 02:56 AM    Magnesium 2.4 01/14/2020 02:56 AM    Phosphorus 3.2 01/14/2020 02:56 AM      Lab Results   Component Value Date/Time    AST (SGOT) 21 01/05/2020 12:21 AM    Alk. phosphatase 83 01/05/2020 12:21 AM    Protein, total 6.4 01/05/2020 12:21 AM    Albumin 2.5 (L) 01/05/2020 12:21 AM    Globulin 3.9 01/05/2020 12:21 AM     Lab Results   Component Value Date/Time    INR 1.1 01/03/2020 05:18 AM    Prothrombin time 11.1 01/03/2020 05:18 AM    aPTT 23.6 01/03/2020 05:18 AM      No results found for: IRON, FE, TIBC, IBCT, PSAT, FERR   No results found for: PH, PCO2, PO2  No components found for: GLPOC   No results found for: CPK, CKMB             Total time: 70 min   Counseling / coordination time, spent as noted above: 60 min  > 50% counseling / coordination?: y  Prolonged service was provided for  []30 min   []75 min in face to face time in the presence of the patient, spent as noted above. Time Start:   Time End:   Note: this can only be billed with 02030 (initial) or 11706 (follow up). If multiple start / stop times, list each separately.

## 2020-01-14 NOTE — CONSULTS
Neurology Progress Note Patient ID: Peterson Holstein 
[id number] 
71 y.o. 
1955 Chief Complaint: Lethargy Subjective:  
 
Khadijah Conteh is a 13-year-old woman who is status post craniotomy for left subdural hemorrhage. She developed seizure activity following and had been on Keppra and Vimpat. Vimpat discontinued. Last EEG on Keppra without seizures. Neurology was reconsulted because she continues to have a lot of lethargy and possibly some increasing mood changes. Her sister-in-law reports she does have baseline irritability and wild moods and does not see a tremendous amount of difference but she is more lethargic. Objective:  
 
 
ROS: 
Cannot obtain secondary to patient medical condition Meds: 
Current Facility-Administered Medications Medication Dose Route Frequency  QUEtiapine (SEROquel) tablet 25 mg  25 mg Oral QHS  [START ON 1/15/2020] QUEtiapine (SEROquel) tablet 12.5 mg  12.5 mg Oral DAILY  traZODone (DESYREL) tablet 50 mg  50 mg Oral QHS  levETIRAcetam (KEPPRA) oral solution 1,000 mg  1,000 mg Per NG tube Q12H  
 bisacodyl (DULCOLAX) suppository 10 mg  10 mg Rectal DAILY PRN  
 senna (SENOKOT) tablet 17.2 mg  2 Tab Oral DAILY  docusate (COLACE) 50 mg/5 mL oral liquid 100 mg  100 mg Oral DAILY  famotidine (PEPCID) 40 mg/5 mL (8 mg/mL) oral suspension 20 mg  20 mg Per NG tube BID  levothyroxine (SYNTHROID) tablet 75 mcg  75 mcg Per NG tube DAILY  acetaminophen (TYLENOL) suppository 650 mg  650 mg Rectal Q4H PRN  
 dextrose 10 % infusion 125-250 mL  125-250 mL IntraVENous PRN  
 miconazole (MICOTIN) 2 % powder   Topical BID  sodium chloride (NS) flush 5-40 mL  5-40 mL IntraVENous Q8H  
 sodium chloride (NS) flush 5-40 mL  5-40 mL IntraVENous PRN  
 acetaminophen (TYLENOL) solution 650 mg  650 mg Oral Q4H PRN  
 ondansetron (ZOFRAN) injection 4 mg  4 mg IntraVENous Q4H PRN  
 sodium chloride (NS) flush 5-40 mL  5-40 mL IntraVENous Q8H  
  sodium chloride (NS) flush 5-40 mL  5-40 mL IntraVENous PRN  
 naloxone (NARCAN) injection 0.4 mg  0.4 mg IntraVENous PRN  
 
 
MRI Results (maximum last 3): No results found for this or any previous visit. Lab Review Recent Results (from the past 24 hour(s)) GLUCOSE, POC Collection Time: 01/13/20  6:12 PM  
Result Value Ref Range Glucose (POC) 101 (H) 65 - 100 mg/dL Performed by Kerri [place] METABOLIC PANEL, BASIC Collection Time: 01/14/20  2:56 AM  
Result Value Ref Range Sodium 136 136 - 145 mmol/L Potassium 4.0 3.5 - 5.1 mmol/L Chloride 100 97 - 108 mmol/L  
 CO2 31 21 - 32 mmol/L Anion gap 5 5 - 15 mmol/L Glucose 130 (H) 65 - 100 mg/dL BUN 16 6 - 20 MG/DL Creatinine 0.66 0.55 - 1.02 MG/DL  
 BUN/Creatinine ratio 24 (H) 12 - 20 GFR est AA >60 >60 ml/min/1.73m2 GFR est non-AA >60 >60 ml/min/1.73m2 Calcium 8.8 8.5 - 10.1 MG/DL  
CBC WITH AUTOMATED DIFF Collection Time: 01/14/20  2:56 AM  
Result Value Ref Range WBC 4.5 3.6 - 11.0 K/uL  
 RBC 4.22 3.80 - 5.20 M/uL  
 HGB 13.3 11.5 - 16.0 g/dL HCT 40.0 35.0 - 47.0 % MCV 94.8 80.0 - 99.0 FL  
 MCH 31.5 26.0 - 34.0 PG  
 MCHC 33.3 30.0 - 36.5 g/dL  
 RDW 12.9 11.5 - 14.5 % PLATELET 599 110 - 101 K/uL MPV 10.0 8.9 - 12.9 FL  
 NRBC 0.0 0  WBC ABSOLUTE NRBC 0.00 0.00 - 0.01 K/uL NEUTROPHILS 63 32 - 75 % LYMPHOCYTES 19 12 - 49 % MONOCYTES 12 5 - 13 % EOSINOPHILS 5 0 - 7 % BASOPHILS 1 0 - 1 % IMMATURE GRANULOCYTES 0 0.0 - 0.5 % ABS. NEUTROPHILS 2.8 1.8 - 8.0 K/UL  
 ABS. LYMPHOCYTES 0.9 0.8 - 3.5 K/UL  
 ABS. MONOCYTES 0.5 0.0 - 1.0 K/UL  
 ABS. EOSINOPHILS 0.2 0.0 - 0.4 K/UL  
 ABS. BASOPHILS 0.0 0.0 - 0.1 K/UL  
 ABS. IMM. GRANS. 0.0 0.00 - 0.04 K/UL  
 DF AUTOMATED PHOSPHORUS Collection Time: 01/14/20  2:56 AM  
Result Value Ref Range Phosphorus 3.2 2.6 - 4.7 MG/DL MAGNESIUM Collection Time: 01/14/20  2:56 AM  
Result Value Ref Range Magnesium 2.4 1.6 - 2.4 mg/dL GLUCOSE, POC Collection Time: 20  5:42 AM  
Result Value Ref Range Glucose (POC) 113 (H) 65 - 100 mg/dL Performed by Kannan Kemp GLUCOSE, POC Collection Time: 20 11:47 AM  
Result Value Ref Range Glucose (POC) 127 (H) 65 - 100 mg/dL Performed by Abrahan Cramer Additional comments:I reviewed the patient's new clinical lab test results. Patient Vitals for the past 8 hrs: 
 BP Temp Pulse Resp SpO2  
20 0951 90/40 98.2 °F (36.8 °C) 72 8 98 % 20 0546 106/49 98 °F (36.7 °C) 70 11 97 % 701 -  190 In: 150 Out: 300 [Urine:300] 1901 -  0700 In: 1948 Out: 1425 [LX] Exam: 
Visit Vitals BP 90/40 (BP 1 Location: Left arm, BP Patient Position: At rest) Pulse 72 Temp 98.2 °F (36.8 °C) Resp 8 Ht 4' 5\" (1.346 m) Wt 60.2 kg (132 lb 11.2 oz) SpO2 98% BMI 33.21 kg/m² Gen: Well developed, Down syndrome CV: RRR Lungs: non labored breathing Abd: soft, non distended Neuro: Awake but drowsy with restraints. CN II-XII: PERRL,  face symmetric, nonverbal 
Motor: Moving extremities spontaneously Sensory: Unreliable DTRs: symmetric COOR: no limb/truncal ataxia Gait: Deferred PROBLEM LIST:  
 
Patient Active Problem List  
Diagnosis Code  SDH (subdural hematoma) ([facility]) S06.5X9A  
 Down's syndrome Q90.9  Acquired hypothyroidism E03.9  Seizures ([facility] [address] R56.9 Assessment/Plan:   
 
69-year-old woman with Down syndrome who has subsequent seizures post op. Seizures seem to be controlled but there is concern she may be having more sedation from [address]. I am going to transition that to Depakote at this point. I will place the orders. I spoke with the sister-in-law at the bedside. This clinical note was dictated with an electronic dictation software that can make unintentional errors. If there are any questions, please contact me directly for clarification. Signed: 
[address] [facility], DO 
1/14/2020 
12:41 PM

## 2020-01-14 NOTE — PROGRESS NOTES
Bedside and Verbal shift change report given to Leeann Frederick RN (oncoming nurse) by Kathie Swenson RN (offgoing nurse). Report included the following information SBAR, Kardex, Intake/Output, MAR, Recent Results, Med Rec Status, Cardiac Rhythm SR and Dual Neuro Assessment.

## 2020-01-15 NOTE — PROGRESS NOTES
Hospitalist Progress Note Cher Delaney MD 
Answering service: [phone] OR [phone] from in house phone Date of Service:  1/15/2020 NAME:  Dany Reyes :  1955 MRN:  [id number] PCP: Virginia Carmona NP Chief Complaint: SDH Admission Summary:  
 
Dany Reyes is a 71 y.o. female who presented with fall/SDH Interval history / Subjective:  
 
Patient seen and examined by the bedside. No family present. Patient is alert but not oriented. In restraints. Sitter present Plan for another family meeting with palliatve today Assessment & Plan: #. Chronic subdural hematoma with midline shift status post craniotomy 2020 
 repeat CT scans reviewed Neurosurgery and neurology on board. PT/OT/speech as able 
  
#. Metabolic encephalopathy, acute on chronic 
- due to delirium and subdural hematoma - Slow improvement, continue sitter,  soft restaints 
- Continue Seroquel  ( dose adjusted per family), trazodone and Haldol PRN 
  
#.  Seizures reported as facial twitching 
- Had 24-hour EEG completed 1/10/2020 which continues to show underlying toxic metabolic abnormality 
- neurology re consulted for increased lethargy and mood changes - Keppra discontinued. Started on Depakote per neuro  
  
#. Down syndromesupportive care 
  
#. Dysphagia, due to acute encephalopathy 
- Dobbhoff in place tube feeds - SLP colin noted. Patient was aspirating on all types of food. - not a candidate for PEG and family in agreement  
  
#. Hypothyroidismcontinue Synthroid #. Given multitude of problems, worsening dysphagia, encephalopathy, palliative care consulted Family meeting held today by palliative and decided on hospice care. Hospice consulted. NG tube removed today Code status: Full Code DVT prophylaxis: SCDs Care Plan discussed with: Patient/Family and Nurse Disposition: TBD home with hospice care ? Hospital Problems  Date Reviewed: 2020 Codes Class Noted POA Seizures ([facility] ICD-10-CM: R56.9 ICD-9-CM: 780.39  2020 Unknown * (Principal) SDH (subdural hematoma) ([facility] ICD-10-CM: L70.5L4Y 
ICD-9-CM: 432.1  2020 Yes Down's syndrome (Chronic) ICD-10-CM: Q90.9 ICD-9-CM: 758.0  2020 Yes Acquired hypothyroidism (Chronic) ICD-10-CM: E03.9 ICD-9-CM: 244.9  2020 Yes Review of Systems:  
Review of systems not obtained due to patient factors. ecephalopathy Physical Examination:  
 
General appearance:  uncooperative, no distress, appears older than stated age Head: left side craniotomy wound/staples in place, no drainage/bleeding Eyes: conjunctivae/corneas clear. Oral mucosa dry, Dobbhoff in place Lungs: CTA BL, nW nR Heart: RRR, NM Neurologic: awake, confused, does not follow commands. Vital Signs:  
 Last 24hrs VS reviewed since prior progress note. Most recent are: 
 
Visit Vitals /55 (BP 1 Location: Left arm, BP Patient Position: At rest) Pulse 85 Temp 97.4 °F (36.3 °C) Resp 17 Ht 4' 5\" (1.346 m) Wt 56.2 kg (123 lb 14.4 oz) SpO2 100% BMI 31.01 kg/m² Intake/Output Summary (Last 24 hours) at 1/15/2020 1349 Last data filed at 1/15/2020 2430 Gross per 24 hour Intake 825 ml Output  Net 825 ml Tmax:  Temp (24hrs), Av °F (36.7 °C), Min:97.4 °F (36.3 °C), Max:98.5 °F (36.9 °C) Data Review:  
Data reviewed by myself: Geovany Rojas (teresa) Result Date: 2020 CLINICAL HISTORY: Evaluate NG tube/OG tube INDICATION: Evaluate NG/OG tube COMPARISON: None FINDINGS: AP Limited supine view of the abdomen is obtained. The OG/NG tube tip lies in appropriate position below the diaphragm. Otherwise no significant interval change or acute findings. Cardiac megaly. Basilar atelectasis. Patient is on a cardiac monitor. PICC line catheter on the right. IMPRESSION: Gastric tube tip is in appropriate position for use. Ct Head Wo Cont Result Date: 1/8/2020 EXAM: CT HEAD WO CONT INDICATION: AMS, L SDH COMPARISON: None. CONTRAST: None. TECHNIQUE: Unenhanced CT of the head was performed using 5 mm images. Brain and bone windows were generated. CT dose reduction was achieved through use of a standardized protocol tailored for this examination and automatic exposure control for dose modulation. FINDINGS: There is persistent shift of the midline to the right by 2.5 mm, previously 3.1 mm. There is mild hydrocephalus which is stable. There is no significant white matter disease. The fluid collection adjacent to the right frontal lobe measures 10.8 mm and previously measured 11.7 mm. The mixed subdural hematoma adjacent to left frontal lobe measures 13.1 mm and previously measured 12.9 mm. The mixed subdural hematoma adjacent to the left parietal lobe measures 12.2 mm and previously measured 12.0 mm. Hematoma adjacent to left frontal lobe measures 9.5 mm and previously measured 9.3 mm. The basilar cisterns are open. No acute infarct is identified. The bone windows demonstrate craniectomy. The visualized portions of the paranasal sinuses and mastoid air cells are clear. IMPRESSION: Interval slight improvement in midline shift. Ct Head Wo Cont Result Date: 1/7/2020 EXAM: CT HEAD WO CONT INDICATION: assess bleed COMPARISON: January 4. CONTRAST: None. TECHNIQUE: Unenhanced CT of the head was performed using 5 mm images. Brain and bone windows were generated. CT dose reduction was achieved through use of a standardized protocol tailored for this examination and automatic exposure control for dose modulation. FINDINGS: There is slight shift of the midline to the right of 3.1 mm, previously 2.0 mm. There is no significant white matter disease. The left subdural hematoma measures 14.7 mm, previously 13.4 mm adjacent to left frontal lobe and 9.3 mm adjacent to the left parietal lobe, previously 8.3 mm.  The fluid collection adjacent to the right frontal lobe measures 11.7 mm, previously 11.4 mm. The drainage catheter has been removed in the interval. The basilar cisterns are open. No acute infarct is identified. The bone windows demonstrate craniotomy. There is fluid in the left ethmoid air cells and mucosal thickening of the maxillary sinus. IMPRESSION: Slight interval increase in left subdural hematoma status post removal of drainage catheter, with slight increase of midline shift to the right. Ct Head Wo Cont Result Date: 1/4/2020 EXAM:  CT HEAD WO CONT INDICATION:   post op craniotomy COMPARISON: CT head 1/30/2020. TECHNIQUE: Unenhanced CT of the head was performed using 5 mm images. Brain and bone windows were generated. CT dose reduction was achieved through use of a standardized protocol tailored for this examination and automatic exposure control for dose modulation. FINDINGS: Stable postsurgical changes of left frontal craniotomy and chronic subdural hematoma evacuation, with a left-sided subdural drain in place. No significant change in size of mixed density left cerebral convexity subdural hematoma with acute hyperdense components, measuring up to 11 mm in maximal thickness. Scattered left cerebral pneumocephalus is unchanged. There is unchanged minimal 2 mm of rightward midline shift. There is an unchanged chronic right subdural hematoma measuring up to 11 mm in maximal thickness. No acute hyperdense component is identified within this subdural hematoma. The ventricles are normal in size and position. Basilar cisterns are patent. No evidence of acute infarct. The paranasal sinuses, mastoid air cells, and middle ears are clear. Unchanged medially deviated right globe. IMPRESSION: 1. Expected postoperative changes of left subdural hematoma evacuation as above, with no significant change in size of mixed density left subdural hematoma with acute hyperdense components.  Stable chronic right subdural hematoma. Stable minimal 2 mm of rightward midline shift. Ct Head Wo Cont Result Date: 1/3/2020 EXAM:  CT HEAD WO CONT INDICATION:  assess subdural hematoma post drainage. TECHNIQUE: Thin axial images were obtained through the calvarium and saved with standard and bone window algorithm. Coronal and sagittal reconstructions were generated. CT dose reduction was achieved through use of a standardized protocol tailored for this examination and automatic exposure control for dose modulation. COMPARISON: CT head yesterday. Previous CT head 11/20/18 FINDINGS: Since the prior CT patient is undergone a left frontal craniotomy for treatment of large chronic primarily left-sided multiloculated subdural hematoma which had significant mass effect, developing herniation as well as developing right-sided hydrocephalus. Postoperative findings are demonstrated with subdural drain in place. Residual extra-axial collection on the left has higher density consistent with more acute blood was some gas/air. Some root persistent effacement of left convexity sulci. This represents a marked interval improvement from the preoperative exam and there has been near complete return of central structures to the midline. There is a chronic right subdural hematoma with some effacement of the sulci. This appears somewhat more prominent on today's exam, however it is likely in part related to interval decompression of the brain and expansion causing increased constant acuity of the right sided subdural hematoma over the frontal and temporal convexity. Small amount of high density blood in the dependent posterior right subdural similar to the preoperative exam with no interval new hemorrhage demonstrated. Otherwise, cerebral density is within normal limits with no evidence of acute brain infarction. IMPRESSION: 1.  Interval left frontal craniotomy and drainage of large left multiloculated chronic subdural hematoma. 2. Postoperative findings on the left as described above. 3. Residual right frontal/temporal convexity chronic subdural hematoma without definite interval change. 4. Near complete return of central structures to the midline. Ct Head Wo Cont Result Date: 1/2/2020 EXAM:  CT HEAD WO CONT INDICATION:   ams COMPARISON: CT head 1/2/2020. TECHNIQUE: Unenhanced CT of the head was performed using 5 mm images. Brain and bone windows were generated. CT dose reduction was achieved through use of a standardized protocol tailored for this examination and automatic exposure control for dose modulation. FINDINGS: Predominantly hypodense to the left cerebral convexity subdural fluid collection measuring up to 2.0 cm in maximal thickness. There are hyperdense septations noted within the fluid collection, indicating a chronic component. No other obvious hyperdense components are identified. There is mass effect on the adjacent brain parenchyma, and resultant 10 mm of rightward midline shift. There is also descending transtentorial herniation with effacement of the suprasellar cistern. There is a small right cerebral convexity hypodense subdural fluid collection which measures up to 8 mm in maximal thickness along the frontal convexity. There is mild local mass effect on the adjacent brain parenchyma. There is no acute infarct. The paranasal sinuses, mastoid air cells, and middle ears are clear. The orbital contents are within normal limits. Disconjugate gaze with medial deviation of the right globe. There are no significant osseous or extracranial soft tissue lesions. IMPRESSION: 1. Predominantly hypodense left cerebral convexity subdural fluid collection with hyperdense membranes, most consistent with a chronic subdural hematoma.  Mass effect results in 10 mm of rightward midline shift and descending transtentorial herniation with effacement of the suprasellar cistern. No large acute component is identified within the subdural hematoma. 2. Additional small right cerebral convexity hypodense subdural fluid collection measuring up to 8 mm in maximal thickness, most consistent with an additional chronic subdural hematoma. The findings were called to Juni Ovalle on 1/2/2020 at 4:11 PM by Dr. Tomy Mohs. Estephanie 128 Xr Chest [facility] Result Date: 1/6/2020 EXAM:  XR CHEST PORT INDICATION: Hypoxia COMPARISON: None TECHNIQUE: Portable AP semiupright chest view at 0833 hours FINDINGS: Cardiac monitoring wires overlie the thorax. There is moderate cardiac silhouette enlargement. There are mild diffuse interstitial opacities with a basilar predominance. There is no pleural effusion or pneumothorax. The visualized bones and upper abdomen are age-appropriate. IMPRESSION: Mild diffuse interstitial opacities with a basilar predominance suggesting pulmonary edema. Moderate cardiac silhouette enlargement. Cta Head Neck W Cont Result Date: 1/2/2020 EXAM:  CTA HEAD NECK W CONT INDICATION:   altered mental status, limp, slumping over, right eye deviated inward. Limited exam due to Down's Syndrome COMPARISON:  CT head 1/2/2020. CONTRAST:  60 mL of Isovue-370. TECHNIQUE:  Unenhanced  images were obtained to localize the volume for acquisition. Multislice helical axial CT angiography was performed from the aortic arch to the top of the head during uneventful rapid bolus intravenous contrast administration. Coronal and sagittal reformations and 3D post processing was performed. CT dose reduction was achieved through use of a standardized protocol tailored for this examination and automatic exposure control for dose modulation. FINDINGS: CTA Head: There is no evidence of large vessel occlusion or flow-limiting stenosis of the intracranial internal carotid, anterior cerebral, and middle cerebral arteries. The anterior communicating artery is patent.  There is no evidence of large vessel occlusion or flow-limiting stenosis of the intracranial vertebral arteries, basilar artery, or posterior cerebral arteries. Diminutive basilar artery on a congenital basis, with fetal origin of the bilateral posterior cerebral arteries. There is no evidence of aneurysm or vascular malformation. The dural venous sinuses and deep cerebral venous system are patent. No evidence of abnormal parenchymal enhancement on delayed phase images. Redemonstrated bilateral chronic subdural hematomas, left larger than right, with rightward midline shift and descending transtentorial herniation. CTA NECK: NASCET method was utilized for calculating stenosis. The aortic arch is unremarkable. The common carotid arteries are normal in course and caliber bilaterally. There is no evidence of significant stenosis in the cervical right internal carotid artery. There is no evidence of significant stenosis in the cervical left internal carotid artery. There is a codominant vertebrobasilar arterial system. The vertebral arteries and imaged portion of the basilar artery are normal in course, size and contour without significant stenosis. Visualized soft tissues of the neck are unremarkable. Atrophic thyroid gland. Visualized lung apices are clear. The proximal thoracic esophagus is dilated and full of debris. No acute fracture or aggressive osseous lesion. Scoliosis partially visualized. Multilevel degenerative disc disease in the cervical spine most advanced at C3-C4 and C5-C6. Multilevel facet arthropathy throughout the cervical spine. IMPRESSION: CTA Head: 1. No evidence of significant stenosis or aneurysm. 2. Redemonstrated bilateral chronic subdural hematomas, left larger than right, with rightward midline shift and descending transtentorial herniation. CTA Neck: 1. No evidence of significant stenosis. No results found for: SDES Lab Results Component Value Date/Time Culture result: MIXED UROGENITAL SKYLA ISOLATED 02/24/2018 04:51 PM  
 
All Micro Results None Labs: reviewed by myself. Recent Labs  
  01/15/20 
0540 01/14/20 0256 WBC 4.6 4.5 HGB 12.2 13.3 HCT 37.9 40.0  321 Recent Labs  
  01/15/20 
0540 01/14/20 
0256 01/13/20 0520 * 136 137  
K 4.2 4.0 3.9 CL 99 100 100 CO2 29 31 32 BUN 18 16 14 CREA 0.73 0.66 0.60 * 130* 115* CA 7.9* 8.8 8.3*  
MG  --  2.4 2.4 PHOS  --  3.2 3.2 No results for input(s): SGOT, GPT, ALT, AP, TBIL, TBILI, TP, ALB, GLOB, GGT, AML, LPSE in the last 72 hours. No lab exists for component: AMYP, HLPSE No results for input(s): INR, PTP, APTT, INREXT, INREXT in the last 72 hours. No results for input(s): FE, TIBC, PSAT, FERR in the last 72 hours. No results found for: FOL, RBCF No results for input(s): PH, PCO2, PO2 in the last 72 hours. No results for input(s): CPK, CKNDX, TROIQ in the last 72 hours. No lab exists for component: CPKMB Lab Results Component Value Date/Time Cholesterol, total 200 (H) 11/02/2009 11:48 AM  
 HDL Cholesterol 65 (H) 11/02/2009 11:48 AM  
 LDL, calculated 108 (H) 11/02/2009 11:48 AM  
 Triglyceride 135 11/02/2009 11:48 AM  
 CHOL/HDL Ratio 3.1 11/02/2009 11:48 AM  
 
Lab Results Component Value Date/Time Glucose (POC) 120 (H) 01/15/2020 11:52 AM  
 Glucose (POC) 92 01/15/2020 05:30 AM  
 Glucose (POC) 107 (H) 01/14/2020 11:37 PM  
 Glucose (POC) 127 (H) 01/14/2020 11:47 AM  
 Glucose (POC) 113 (H) 01/14/2020 05:42 AM  
 
Lab Results Component Value Date/Time  Color YELLOW/STRAW 01/02/2020 02:31 PM  
 Appearance CLEAR 01/02/2020 02:31 PM  
 Specific gravity 1.020 01/02/2020 02:31 PM  
 pH (UA) 7.0 01/02/2020 02:31 PM  
 Protein NEGATIVE  01/02/2020 02:31 PM  
 Glucose NEGATIVE  01/02/2020 02:31 PM  
 Ketone NEGATIVE  01/02/2020 02:31 PM  
 Bilirubin NEGATIVE  01/02/2020 02:31 PM  
 Urobilinogen 1.0 01/02/2020 02:31 PM  
 Nitrites NEGATIVE  01/02/2020 02:31 PM  
 Leukocyte Esterase NEGATIVE  01/02/2020 02:31 PM  
 Epithelial cells FEW 01/02/2020 02:31 PM  
 Bacteria NEGATIVE  01/02/2020 02:31 PM  
 WBC 0-4 01/02/2020 02:31 PM  
 RBC 0-5 01/02/2020 02:31 PM  
 
 
 
Medications Reviewed:  
 
Current Facility-Administered Medications Medication Dose Route Frequency  QUEtiapine (SEROquel) tablet 25 mg  25 mg Oral QHS  QUEtiapine (SEROquel) tablet 12.5 mg  12.5 mg Oral DAILY  valproic acid (as sodium salt) (DEPAKENE) 250 mg/5 mL (5 mL) oral solution 500 mg  500 mg Oral Q8H  
 traZODone (DESYREL) tablet 50 mg  50 mg Oral QHS  bisacodyl (DULCOLAX) suppository 10 mg  10 mg Rectal DAILY PRN  
 senna (SENOKOT) tablet 17.2 mg  2 Tab Oral DAILY  docusate (COLACE) 50 mg/5 mL oral liquid 100 mg  100 mg Oral DAILY  famotidine (PEPCID) 40 mg/5 mL (8 mg/mL) oral suspension 20 mg  20 mg Per NG tube BID  levothyroxine (SYNTHROID) tablet 75 mcg  75 mcg Per NG tube DAILY  acetaminophen (TYLENOL) suppository 650 mg  650 mg Rectal Q4H PRN  
 dextrose 10 % infusion 125-250 mL  125-250 mL IntraVENous PRN  
 miconazole (MICOTIN) 2 % powder   Topical BID  sodium chloride (NS) flush 5-40 mL  5-40 mL IntraVENous Q8H  
 sodium chloride (NS) flush 5-40 mL  5-40 mL IntraVENous PRN  
 acetaminophen (TYLENOL) solution 650 mg  650 mg Oral Q4H PRN  
 ondansetron (ZOFRAN) injection 4 mg  4 mg IntraVENous Q4H PRN  
 sodium chloride (NS) flush 5-40 mL  5-40 mL IntraVENous Q8H  
 sodium chloride (NS) flush 5-40 mL  5-40 mL IntraVENous PRN  
 naloxone (NARCAN) injection 0.4 mg  0.4 mg IntraVENous PRN  
 
______________________________________________________________________ EXPECTED LENGTH OF STAY: 6d 16h ACTUAL LENGTH OF STAY:          13 Kristene Goodpasture, MD  
 
Patient's emergency contacts: 
Extended Emergency Contact Information Primary Emergency Contact: Samuel Castro Address: [address] [address] [address], [address] [facility] [address] [place] Home Phone: [phone] Relation: Brother Secondary Emergency Contact: SYMONE HENDRICKS Home Phone: [phone] Relation: Other Relative

## 2020-01-15 NOTE — HOSPICE
[facility] Good Help to Those in Need 
[phone] Social Work Admission Note Patient Name: Dany Reyes YOB: 1955 Age: 71 y.o. Date of Visit: 01/15/20 Facility of Care: [facility] Patient Room: 660/01 Hospice Attending: César Alves MD 
Hospice Diagnosis: [facility] Level of Care:  
 [x]  GIP []  Respite 
 []  Routine NARRATIVE This LCSW, Aminata Francisco MSW, and Meir Paul RN met with pts caretaker/brother Karel Castro, and his wife Von Benavides for GIP admission. P Pt is minimally responsive. Pt is a 70 y/o CF with a hospice diagnosis of SDH. Pt has comorbid SDH s/p craniotomy, Downs syndrome, seizures, metabolic encephalopathy, dysphagia, hx of frequent falls, and anxiety. Pt was admitted 1/2/2020 from home due to AMS and dx of [facility]. Pt has been living with her brother and his wife on and off for the last 6 years. Pt had Medicaid aide in the home M-S from 8 -5. Pt was lying in the bed with stuffed animals and the radio playing . Family reports pt enjoys 80s rock music and dancing. The family describe pt as sweet and sour, having times of sweetness and times of meanness. LCSW and family discussed the event that lead to pts hospitalization. Family reports pt has been declining over the past several months and has been regressing. LCSW provided emotional support for Joshua Griffin and Von Benavides in coping with pts now terminal SDH. Family appears realistic and accepting. Brother talked about pt exceeding the life span of an average downs syndrome individual. That being said, the family is experiencing multiple stressors. Joshua Griffin is recovering from recent back surgery done in 11/19. His mother recently passed and he was also pcg for her. Von Benavides is living in [place] talking care of her aging father, her mother recently passed, and the couple has 23 y/o twins with Aspergers dz. LCSW provided supportive counseling for Clinton Almonteh in coping with multiple stressors.  LCSW provided resources for Nguyễn Beltran, including [facility] resources. Pt will be buried in [facility], along with her parents. No FH has been determined. Low risk for BR due to acceptance and healthy coping. ADVANCE CARE PLANNING Code Status: DNR Durable DNR: Malik Nab  _ No 
Advance Care Planning 2020 Confirm Advance Directive None Relationship Status: 
[x]  Single    
[]       
[]     
[]  Domestic Partner    
[]  / 
[]  Common Law 
[]   
[]  Unknown If in a relationship, name of partner/spouse: 
Duration of relationship: 
 
Pentecostal: Islam  Home: TBD Resources Provided: FH, Social Work Initial Assessment Gender: 
female Race/Ethnicity: (annabella all that apply) []  [place] or [place] Native 
[]   
[]  Black or [place] American 
[]   or  
[]   or [place] 
[x]  Nani Daeverett 
[]  Unknown 
  
 Service:   
[]  Yes  
[x]  No      
[]  Unknown Appropriate for Pinning Ceremony:  
[]  Yes     
[]  No 
Is patient using VA benefits? []  Yes     
[]  No 
  
Primary Language: English 
[]   Needed 
[]   utilized during visit Ability to express thoughts/needs/feelings 
[]  Expressed thoughts/feelings/needs without difficulty 
[]  Requires extra time and cuing 
[]  Speech limited single words [x]  Uses only gestures (eye, blinking eye or head movement/pointing) []  Unable to express thoughts/feelings/needs (speech unintelligible or inappropriate) []  Unresponsive Notes:  
  
Mental Status: 
[x]  Alert-oriented to:   
 []  Person   
 []  Place   
 []  Time 
[]  Comatose-responds to:  
 []   Verbal stimuli  
 []  Tactile stimuli  
 []  Painful stimuli 
[]  Forgetful 
[x]  Disoriented/Confused 
[x]  Lethargic [x]  Agitated 
[x]  Other (specify):  Downs syndrome Notes:  
  
Patients description of Illness/Current Health Status:   
[x]  Patient unable to discuss []  Patient unwilling to discuss 
[]  (Specify) Knowledge/Understanding of Disease Process Patient:  
 []  Demonstrates knowledge/understanding of disease process 
 []  Demonstrates knowledge/understanding of treatment plan 
 []  Demonstrates knowledge/understanding of prognosis []  Demonstrates acceptance of prognosis []  Demonstrates knowledge/understanding of resuscitation status [x]  Other (specify) Caregiver: 
 [x]  Demonstrates knowledge/understanding of disease process [x]  Demonstrates knowledge/understanding of treatment plan 
 [x]  Demonstrates knowledge/understanding of prognosis [x]  Demonstrates acceptance of prognosis [x]  Demonstrates knowledge/understanding of resuscitation status 
 []  Other (specify) Notes:  
  
Patients living arrangement/care setting: 
Use the PRIOR COLUMN when the PATIENTS current health status necessitated a change in his/her primary residence. Prior Current Response  
           []             []    Patients own home/residence [x]             []    Home of family member/friend []             []    Boarding home  
           []             []    Assisted living facility/halfway center []             []    Hospital/Acute care facility []             []    Skilled nursing facility []             []    Long term care facility/Nursing home  
           []             [x]    Hospice in Patient Primary Caregiver: 
[]  No Primary Caregiver Name of Primary Caregiver: Juanjose Hoffman Relationship or Primary Caregiver:  
 []  Spouse/Significant other     
 []  Natural Child      
 []  Step child     
 [x]  Sibling 
 []  Parent 
 []  Friend/Neighbor 
 []  Community/Buddhist Volunteer 
 []  Paid help 
 []  Other (specify):___________ Notes:   
  
Family members/Significant others: 
Idalmis Randall Relationship: brother/caregiver Phone [phone] Actively involved in care? [x]  Yes  []  No 
 
Name:Michela Relationship: JOSEP Phone Number: Actively involved in care? [x]  Yes  []  No 
 
Name: 
Relationship: 
Phone Number: Actively involved in care? []  Yes  []  No 
 
Social support systems: (select ONE best description) [x]  Excellent social support system which includes three or more family members or friends 
[]  Good social support system which includes two or less members or friends 
[]  [address] support which includes one family member or friend 
[]  Poor social support; no family members or friends; basically ALONE Notes:  
  
Emotional Status: (annabella all that apply) Patient Caregiver Response  
              []                [x]    Mood/Affect stable and appropriate    
              []                []    Angry  
              [x]                []    Anxious []                []    Apprehensive []                []    Avoidant  
              []                []    Clinging  
              []                []    Depressed  
              []                []    Distraught  
              []                []    Elated []                []    Euphoric  
              []                []    Fearful  
              []                []    Flat Affect  
              []                []    Helpless []                []    Hostile []                []    Impulsive []                []    Irritable  
              []                []    Labile  
              []                []    Manic  
              []                []    Restlessness []                [x]    Sad  
              []                []    Suspicious []                []    Tearful  
              []                []    Withdrawn Notes:  
 
Coping Skills (strengths/weakness):  
 Patient: Coping Skills (strength/weakness): Could not engage. Family/caregiver (strength/weakness): James Villegas has supportive family, he is realistic and accepting/ family has multiple stressors, Jp's back surgery, mother's death, Jordi Morrison is her father's caregiver.  
  
[place] of care (annabella all that apply):    
[]  No burden evident  
[]  Family must administer medications  
[]  Illness causing financial strain  
[x]  Family/Support feels overwhelmed  
[]  Family/Support sleep disturbed with patients care  
[]  Patients care causes extra physical stress  of death 
[]  Illness causes changes in family lifestyle 
[]  Illness impacting family/support employment 
[]  Family experiencing increased time demands 
[]  Patients behavior endangers family 
[]  Denial of patients illness 
[]  Concern over outcome of illness/fear 
[]  Patients behavior embarrassing to family Notes:  
  
Risk Factors: (annabella all that apply):   
[]  No burden evident  
[]  Alcohol abuse 
[]  Financial resources inadequate to meet basic needs (food/house/etc) []  Financial resources inadequate to meet health care needs (supplies/equipment/medications) 
[]  Food/nutrition resources inadequate 
[]  Home environment unsafe/inadequate for home care 
[]  Homicidal risk 
[]  Lives alone or without concerned relatives 
[]  Multiple medications/complex schedule 
[]  Physical limitations increase likelihood of falls 
[]  Plan of care/treatments complicated 
[]  Substance use/abuse 
[]  Suicidal risk 
[]  Visual impairment threatens safety/ability to perform self-care 
[]  Other (specify): 
  
Abuse/Neglect (actual/potential risks): 
[x]  No signs of abuse/neglect 
[]  History of abuse/neglect                 []  IVATDUZF          []  Sexual 
[]  History of domestic violence 
[]  Lacks adequate physical care 
[]  Lacks emotional nurturing/support 
[]  Lacks appropriate stimulation/cognitive experiences 
[]  Left alone inappropriately []  Lacks necessary supervision 
[]  Inadequate or delayed medical care 
[]  Unsafe environment (i.e guns/drug use/history of violence in the home/etc.) []  Bruising or other physical signs of injury present 
[]  Other (specify): 
Notes:  
[]  Refer to child/adult protective services Current Sources of Stress (in Addition to Current Illness):  
[]  None reported 
[]  Bills/Debt   
[]  Career/Job change   
[]   (short term)   
[]   (long term)   
[]  Death of a child (recent)   
[]  Death of a parent (recent)  
[]  Death of a spouse (recent)  
[]  Employment status changed  
[]  Family discord   
[]  Financial loss/Inadequate inther (specify):come 
[]  Job loss 
[]  Legal issues unresolved 
[]  Lifestyle change 
[]  Marital discord 
[]  Marriage within the last year 
[]  Paperwork (insurance/legal/etc) overwhelming 
[]  Separation/Divorce [x]  Other (specify): Health and care giving issues Notes:  
  
Current [employer] Being Utilized 1. Interventions/Plan of Care 1. Assess social and emotional factors related to coping with end of life issues 2. Community resource planning/referral  
3. Relocation to different care setting if/when symptoms stabilize 4. Discharge Planning 1. May pass as in pt hospice pt, possible hospice house transfer, has medicaid MSW Assessment Completed by: Pippa Salvador 01/15/20 Time In: 3:30 pm      
Time Out: 5:00 pm

## 2020-01-15 NOTE — PROGRESS NOTES
Problem: Falls - Risk of 
Goal: *Absence of Falls Description Document Pippa Latin Fall Risk and appropriate interventions in the flowsheet. Outcome: Progressing Towards Goal 
Note: Fall Risk Interventions: 
Mobility Interventions: Communicate number of staff needed for ambulation/transfer, Patient to call before getting OOB, PT Consult for mobility concerns Mentation Interventions: Adequate sleep, hydration, pain control, Bed/chair exit alarm, Family/sitter at bedside, Increase mobility, More frequent rounding, Reorient patient, Toileting rounds, Update white board Medication Interventions: Evaluate medications/consider consulting pharmacy, Patient to call before getting OOB, Teach patient to arise slowly Elimination Interventions: Bed/chair exit alarm, Call light in reach, Patient to call for help with toileting needs History of Falls Interventions: Bed/chair exit alarm, Consult care management for discharge planning, Door open when patient unattended

## 2020-01-15 NOTE — HOSPICE
[facility] Good Help to Those in Need 
[phone] Patient Name: Pratima Herron YOB: 1955 Age: 71 y.o. [facility] RN Note:  Hospice consult received, reviewing chart. Will follow up with Unit Nurse and Care Manager to discuss plan of care, patient status and discharge disposition within the day. [id number] Left message with Leida Kuhn to return call to set up a time for hospice consult. 0 Meeting with family set up for 3pm. 
 
Thank you for the opportunity to be of service to this patient. Earlene Benedict, RN [place] [employer]

## 2020-01-15 NOTE — PROGRESS NOTES
TRANSFER - OUT REPORT: 
 
Verbal report given to Melinda Simpson RN(name) on Tip Warner  being transferred to (unit) for routine progression of care Report consisted of patients Situation, Background, Assessment and  
Recommendations(SBAR). Information from the following report(s) SBAR, Kardex, OR Summary, Procedure Summary, Intake/Output, MAR and Cardiac Rhythm NSR was reviewed with the receiving nurse. Lines: PICC Triple Lumen 43/36/48 Right;Basilic (Active) Central Line Being Utilized Yes 1/15/2020  8:00 AM  
Criteria for Appropriate Use Limited/no vessel suitable for conventional peripheral access 1/15/2020  8:00 AM  
Site Assessment Clean, dry, & intact 1/15/2020  8:00 AM  
Phlebitis Assessment 0 1/15/2020  8:00 AM  
Infiltration Assessment 0 1/15/2020  8:00 AM  
Arm Circumference (cm) 27 cm 1/14/2020 12:00 PM  
Date of Last Dressing Change 01/13/20 1/14/2020  8:00 PM  
Dressing Status Clean, dry, & intact 1/15/2020  8:00 AM  
External Catheter Length (cm) 1 centimeters 1/14/2020 12:00 PM  
Dressing Type Disk with Chlorhexadine gluconate (CHG) 1/15/2020  8:00 AM  
Action Taken Open ports on tubing capped 1/15/2020  4:00 AM  
Hub Color/Line Status Gray 1/15/2020  8:00 AM  
Positive Blood Return (Site #1) Yes 1/15/2020  8:00 AM  
Hub Color/Line Status Red 1/15/2020  8:00 AM  
Positive Blood Return (Site #2) Yes 1/15/2020  8:00 AM  
Hub Color/Line Status White 1/15/2020  8:00 AM  
Positive Blood Return (Site #3) Yes 1/15/2020  8:00 AM  
Alcohol Cap Used Yes 1/15/2020  8:00 AM  
  
 
Opportunity for questions and clarification was provided. Patient transported with: 
Transport

## 2020-01-15 NOTE — DISCHARGE SUMMARY
Discharge Summary Patient:  Kimberly Santana MRN: [id number] YOB: 1955 Age: 71 y.o. Date of admission:  1/2/2020 Date of discharge:  1/15/2020 Primary care provider: Dr. Niraj Lang, NP Admitting provider:  Leslee Paulson DO Discharging provider:  Leonel Crenshaw 91.: [phone]. If unavailable, call [phone] and ask the  to page the triage hospitalist. 
 
Consultations · IP CONSULT TO NEUROLOGY · IP CONSULT TO PALLIATIVE CARE - PROVIDER Procedures · Procedure(s): 
· CRANIOTOMY LEFT FRONTAL FORSUBDURAL HEMATOMA Discharge destination: Inpatient hospice. Admission diagnosis · [facility] [R40.2T1B] Discharge Medication List as of 1/15/2020  5:01 PM  
  
 
 
Follow-up Information Follow up With Specialties Details Why Contact Info Niraj Lang NP Nurse Practitioner   [facility] [address] 
[phone] Final discharge diagnoses and brief hospital course 
   
Kimberly Santana is a 71 y.o. female who presented with fall/SDH. #.  Chronic subdural hematoma with midline shift status post craniotomy 1/2/2020 
 repeat CT scans reviewed Neurosurgery and neurology on board. PT/OT/speech as able 
  #.  Metabolic encephalopathy, acute on chronic 
- due to delirium and subdural hematoma - Slow improvement, continue sitter,  soft restaints 
- on Seroquel, trazodone and Haldol PRN 
  
#.  Seizures reported as facial twitching 
- Had 24-hour EEG completed 1/10/2020 which continues to show underlying toxic metabolic abnormality 
- neurology re consulted for increased lethargy and mood changes - Keppra discontinued. Started on Depakote per neuro 1/14 
  
#.  Down syndromesupportive care 
  #.  Dysphagia, due to acute encephalopathy 
- Dobbhoff in place tube feeds - SLP eval noted. Patient was aspirating on all types of food. - not a candidate for PEG and family in agreement  
  
#.  Hypothyroidismcontinue Synthroid 
  
#. Given multitude of problems, worsening dysphagia, encephalopathy, palliative care consulted Family meeting held today by palliative and decided on hospice care. Hospice consulted. NG tube removed today Hospice evaluated the patient and accepted into inpatient service.  
  
 
Physical examination at discharge Visit Vitals /40 (BP 1 Location: Left arm, BP Patient Position: At rest) Pulse 67 Temp 97.4 °F (36.3 °C) Resp 18 Ht 4' 5\" (1.346 m) Wt 56.2 kg (123 lb 14.4 oz) SpO2 100% BMI 31.01 kg/m² General appearance:  uncooperative, no distress, appears older than stated age Head: left side craniotomy wound/staples in place, no drainage/bleeding Eyes: conjunctivae/corneas clear. Oral mucosa dry, Dobbhoff in place Lungs: CTA BL, nW nR Heart: RRR, NM Neurologic: awake, confused, does not follow commands. Pertinent imaging studies: CT head 1/2: 
1. Predominantly hypodense left cerebral convexity subdural fluid collection 
with hyperdense membranes, most consistent with a chronic subdural hematoma. Mass effect results in 10 mm of rightward midline shift and descending 
transtentorial herniation with effacement of the suprasellar cistern. No large 
acute component is identified within the subdural hematoma. 2. Additional small right cerebral convexity hypodense subdural fluid collection 
measuring up to 8 mm in maximal thickness, most consistent with an additional 
chronic subdural hematoma. CT head 1/7: 
Slight interval increase in left subdural hematoma status post removal of drainage catheter, with slight increase of midline shift to the right. 
  
 
Recent Labs  
  01/15/20 
0540 01/14/20 
0256 01/13/20 
0520 WBC 4.6 4.5 4.2 HGB 12.2 13.3 12.7 HCT 37.9 40.0 39.1  321 305 Recent Labs 01/15/20 
7875 01/14/20 
0256 01/13/20 
6803 * 136 137  
K 4.2 4.0 3.9 CL 99 100 100 CO2 29 31 32 BUN 18 16 14 CREA 0.73 0.66 0.60 * 130* 115* CA 7.9* 8.8 8.3*  
MG  --  2.4 2.4 PHOS  --  3.2 3.2 No results for input(s): SGOT, GPT, AP, TBIL, TP, ALB, GLOB, GGT, AML, LPSE in the last 72 hours. No lab exists for component: AMYP, HLPSE No results for input(s): INR, PTP, APTT, INREXT in the last 72 hours. No results for input(s): FE, TIBC, PSAT, FERR in the last 72 hours. No results for input(s): PH, PCO2, PO2 in the last 72 hours. No results for input(s): CPK, CKMB in the last 72 hours. No lab exists for component: TROPONINI No components found for: [place] Chronic Diagnoses:   
Problem List as of 1/15/2020 Date Reviewed: 1/2/2020 Codes Class Noted - Resolved Seizures ([facility] ICD-10-CM: R56.9 ICD-9-CM: 780.39  1/6/2020 - Present * (Principal) SDH (subdural hematoma) ([facility] ICD-10-CM: S30.6Y9Y 
ICD-9-CM: 432.1  1/2/2020 - Present Down's syndrome (Chronic) ICD-10-CM: Q90.9 ICD-9-CM: 758.0  1/2/2020 - Present Acquired hypothyroidism (Chronic) ICD-10-CM: E03.9 ICD-9-CM: 244.9  1/2/2020 - Present Time spent on discharge related activities today greater than 30 minutes. Signed:  Mike Bruce MD 
               Hospitalist, Internal Medicine Cc: Antonieta Mehta NP

## 2020-01-15 NOTE — PROGRESS NOTES
JF: 
 
Palliative meeting held today with family. Hospice recommended. The Plan for Transition of Care is related to the following treatment goals: Hospice recommended. Family selected [facility]. Sent referral via [employer]; Awaiting response. Family was provided with a choice of provider and agrees  
with the discharge plan. [x] Yes [] No 
 
Freedom of choice list was provided with basic dialogue that supports the patient's individualized plan of care/goals, treatment preferences and shares the quality data associated with the providers. [x] Yes [] No 
 
CM to follow. ASHLEY Tipton,CRM

## 2020-01-15 NOTE — PROGRESS NOTES
TRANSFER - IN REPORT: 
 
Verbal report received from [facility] (name) on Pratima Herron  being received from 6S (unit) for routine progression of care   (hospice) Report consisted of patients Situation, Background, Assessment and  
Recommendations(SBAR). Information from the following report(s) SBAR, Kardex and MAR was reviewed with the receiving nurse. Opportunity for questions and clarification was provided. Assessment completed upon patients arrival to unit and care assumed.

## 2020-01-15 NOTE — HOSPICE
[address] Good Help to Those in Need 
[phone] Patient Name: Kendra Grullon YOB: 1955 Age: 71 y.o. [facility] Note:  Hospice consult noted. Chart reviewed. Plan of care discussed with patients care manager. This NAPOLEONW, Steffanie Scott MSW, and Fabio Caro RN met with pts brother and caretaker Therese Mayfield ad his wife Sancho Ordaz for hospice consult. Discussed Hospice philosophy, general plan of care, levels of care, services and on call procedures. Pt to be admitted into in hospice. Report to follow. Thank you for the opportunity to be of service to Ms. Castro and her family. Shruti BENDERW,  [email or web address] [place] [phone]

## 2020-01-15 NOTE — PROGRESS NOTES
Bedside shift change report given to [facility] (oncoming nurse) by Idalia Batista (offgoing nurse). Report included the following information SBAR, Kardex, Intake/Output, MAR and Dual Neuro Assessment.

## 2020-01-15 NOTE — H&P
[employer] Good Help to Those in Need 
[phone] Patient Name: Jose Mora YOB: 1955 Date of Provider Hospice Visit: 01/15/20 Level of Care:   [x] General Inpatient (GIP)    [] Routine   [] Respite Current Location of Care: 
[] [facility] [] [facility] [] [facility] [] [facility] [] Hospice [facility] IF [facility], patient referred from: 
[] [facility] [] [facility] [] [facility] [] [facility] [] Home [] Other:  
 
Date of Original Hospice Admission: 1/15/20 Hospice Medical Director at time of admission: Shahram Becerra MD 
 
Principle Hospice Diagnosis: Subdural hematoma Diagnoses RELATED to the terminal prognosis: Metabolic encephalopathy Seizures Other Diagnoses:  
Down Syndrome Hypothyroidism [facility] Jose Mora is a 71y.o. year old who was admitted to [facility]. She has down syndrome, chronic SDH, frequent falls, anxiety, hypothyroidism and was admitted to [facility] on 1/2/2020 from home due to [address] [place] [address] with a diagnosis of SDH now s/p craniotomy for left frontal SDH. Her post-operative course has been complicated by fluctuating mentation, seizures, agitation/restlessness, and dysphagia/aspiration. She was receiving supplemental nutrition with dobhoff tube until she pulled it out. The family met with Palliative to discuss [address]. Risks and beneftis of g-tube were discussed, and family believed she would be agitated by this and would likely pull it as she did the dobhoff. The family decided to pursue comfort-focused care with hospice. At time of my exam pt is unresponsive with PPS 10%, significant restlessness, intermittent apnea, and has an estimated prognosis of hours to days. The patient/family chose comfort measures with the support of Hospice. Objective information:  
CT Head 1/8/20: 
FINDINGS: 
There is persistent shift of the midline to the right by 2.5 mm, previously 3.1 
mm. There is mild hydrocephalus which is stable. There is no significant white matter disease. The fluid collection adjacent to the right frontal lobe measures 10.8 mm and previously measured 11.7 mm. The mixed subdural hematoma adjacent to 
left frontal lobe measures 13.1 mm and previously measured 12.9 mm. The mixed 
subdural hematoma adjacent to the left parietal lobe measures 12.2 mm and 
previously measured 12.0 mm. Hematoma adjacent to left frontal lobe measures 9.5 
mm and previously measured 9.3 mm. The basilar cisterns are open. No acute 
infarct is identified. The bone windows demonstrate craniectomy. The visualized 
portions of the paranasal sinuses and mastoid air cells are clear. IMPRESSION: Interval slight improvement in midline shift. HOSPICE ASSESSMENT Active Symptoms and Issues: 
-Anxiety/agitation/restlessness: has been very significant and was requiring sitter, pulled dobhoff tube, visibly restless on exam today, may be related to terminal restlessness, has left wrist restraint in place 
-Seizures: reported as facial twitching, was recently changed from keppra to depakote d/t concern that keppra was causing excessive drowsiness 
-Apnea: intermittent 
-Generalized pain: intermittent grimace on exam 
-Secretions 
-Decreased responsiveness/unresponsiveness PLAN 1. GIP level of care needed for progressive worsening symptoms of seizures, restlessness necessitating frequent skilled nursing assessment and administration of parenteral medications. Needs monitoring for need to titrate sx mgt regimen for optimization of comfort. Pt is at high risk of rapid decline and death due to terminal disease process. 2. Symptom management medications: 1. Morphine 2 mg IV [id number] prn pain, air hunger 2. Ativan 1 mg IV q6hrs +q15 mins prn anxiety/agitation/restlessness/respiratory distress. Will try to remove wrist restraint and take caution to protect PICC access.  
3. Have converted oral depakote to IV depacon as patient has experienced seizures this admission and is at risk of further seizures 3. Recommend recovery positioning for drainage of current secretions. Glycopyrrolate prn to help prevent formation of new secretions. Gentle anterior suction of secretions okay (i.e. suction around lips/teeth). Recommend avoiding deep suction to prevent irritation, pain, bleeding. 4. Prn bisacodyl for constipation 5. Prn toradol and/or tylenol for fever 6.  and SW to support family needs 7. Disposition: anticipate she will need GIP LOC for eol d/t her care requirements including regular parenteral medication administration 8. Hospice Plan of care was reviewed in detail and agree with current plan of care Prognosis estimated based on 01/15/20 clinical assessment is:  
[x] Hours to Days   
[] Days to Weeks   
[] Other: 
 
Communicated plan of care with: Hospice Case Manager; Hospice IDT; Care Team 
 
 GOALS OF CARE Patient/Medical POA stated Goal of Care: optimize patient comfort [x] I have reviewed and/or updated ACP information in the Advance Care Planning Navigator. This information is available in the [address] link in the patient's chart header. Primary Medical Decision Maker:   Primary Decision Maker (Active): Joselin Bolton - [phone] Resuscitation Status: DNR If DNR is there a Durable DNR on file? : [x] Yes [] No (If no, complete Durable DNR) HISTORY History obtained from: chart review, hospice liason, hospice SW, patient's brother CHIEF COMPLAINT: patient cannot give as unresponsive The patient is:  
[] Verbal 
[] Nonverbal 
[x] Unresponsive HPI/SUBJECTIVE:   
Brother recounts hospital course States he knows she is not getting better--he thinks she may have a few days at best to live Brother states she has periods of RR <10 and intermittent apnea His goal is for her comfort REVIEW OF SYSTEMS The following systems were: [] reviewed  [x] unable to be reviewed as pt is unresponsive Positive ROS include: 
Constitutional: fatigue, weakness, in pain, short of breath Ears/nose/mouth/throat: increased airway secretions Respiratory:shortness of breath, wheezing Gastrointestinal:poor appetite, nausea, vomiting, abdominal pain, constipation, diarrhea Musculoskeletal:pain, deformities, swelling legs Neurologic:confusion, hallucinations, weakness Psychiatric:anxiety, feeling depressed, poor sleep Endocrine:  
 
Adult Non-Verbal Pain Assessment Score: 4 Face 
[] 0   No particular expression or smile 
[x] 1   Occasional grimace, tearing, frowning, wrinkled forehead 
[] 2   Frequent grimace, tearing, frowning, wrinkled forehead Activity (movement) [] 0   Lying quietly, normal position 
[] 1   Seeking attention through movement or slow, cautious movement 
[x] 2   Restless, excessive activity and/or withdrawal reflexes Guarding 
[] 0   Lying quietly, no positioning of hands over areas of body [x] 1   Splinting areas of the body, tense 
[] 2   Rigid, stiff Physiology (vital signs) [x] 0   Stable vital signs [] 1   Change in any of the following: SBP > 20mm Hg; HR > 20/minute 
[] 2   Change in any of the following: SBP > 30mm Hg; HR > 25/minute Respiratory [x] 0   Baseline RR/SpO2, compliant with ventilator 
[] 1   RR > 10 above baseline, or 5% drop SpO2, mild asynchrony with ventilator 
[] 2   RR > 20 above baseline, or 10% drop SpO2, asynchrony with ventilator FUNCTIONAL ASSESSMENT Palliative Performance Scale (PPS): 10 PSYCHOSOCIAL/SPIRITUAL ASSESSMENT Active Problems: 
  [facility] (1/2/2020) Past Medical History:  
Diagnosis Date  Endocrine disease   
 hypothyroidism  Psychiatric disorder Down Syndrome Past Surgical History:  
Procedure Laterality Date  HX CHOLECYSTECTOMY Social History Tobacco Use  Smoking status: Never Smoker  Smokeless tobacco: Never Used Substance Use Topics  Alcohol use: No  
 
No family history on file. No Known Allergies Current Facility-Administered Medications Medication Dose Route Frequency  LORazepam (ATIVAN) injection 1 mg  1 mg IntraVENous Q15MIN PRN  
 ketorolac (TORADOL) injection 15 mg  15 mg IntraVENous Q8H PRN  
 glycopyrrolate (ROBINUL) injection 0.2 mg  0.2 mg IntraVENous Q4H PRN  
 bisacodyL (DULCOLAX) suppository 10 mg  10 mg Rectal Q72H PRN  
 morphine injection 2 mg  2 mg IntraVENous Q15MIN PRN  
 LORazepam (ATIVAN) injection 1 mg  1 mg IntraVENous Q6H  
 
 
 PHYSICAL EXAM  
 
Wt Readings from Last 3 Encounters:  
01/15/20 56.2 kg (123 lb 14.4 oz) 01/02/20 66 kg (145 lb 8.1 oz) 04/28/18 68 kg (150 lb) There were no vitals taken for this visit. Supplemental O2  [] Yes  [x] NO Last bowel movement: 1/12 Currently this patient has: 
[] Peripheral IV [x] PICC  [] PORT [] ICD [] Riojas Catheter [] NG Tube   [] PEG Tube   
[] Rectal Tube [] Drain 
[] Other: external female catheter; staples s/p surgery Constitutional: patient lying abed with eyes closed, intermittent grimace, her left hand is in a wrist restraint Head: left scalp post-op with staples Eyes: lids normal, no drainage ENMT: mouth closed so cannot examine, some dryness of lips Cardiovascular: HR 60s, no peripheral edema Respiratory: RR 10s with intermittent apnea, moderate secretions Gastrointestinal: soft, nontender Skin: warm, dry Neurologic: not alert or responsive to exam 
Psychiatric: unable to assess as pt is unresponsive Pertinent Lab and or Imaging Tests: 
Lab Results Component Value Date/Time  Sodium 134 (L) 01/15/2020 05:40 AM  
 Potassium 4.2 01/15/2020 05:40 AM  
 Chloride 99 01/15/2020 05:40 AM  
 CO2 29 01/15/2020 05:40 AM  
 Anion gap 6 01/15/2020 05:40 AM  
 Glucose 119 (H) 01/15/2020 05:40 AM  
 BUN 18 01/15/2020 05:40 AM  
 Creatinine 0.73 01/15/2020 05:40 AM  
 BUN/Creatinine ratio 25 (H) 01/15/2020 05:40 AM  
 GFR est AA >60 01/15/2020 05:40 AM  
 GFR est non-AA >60 01/15/2020 05:40 AM  
 Calcium 7.9 (L) 01/15/2020 05:40 AM  
 
Lab Results Component Value Date/Time Protein, total 6.4 01/05/2020 12:21 AM  
 Albumin 2.5 (L) 01/05/2020 12:21 AM  
 
   
 
Total time: 75 mins Counseling / coordination time: 40 
> 50% counseling / coordination?: yes, including counseling family regarding my assessment and estimated prognosis, s/s of EOL, recommended medication changes for present sxs, recommendations regarding need for GIP LOC and conditions in which we would consider transfer of care to the home setting Abdelrahman Lilly MD 
[facility]

## 2020-01-15 NOTE — HOSPICE
[facility] Liaison note: Met with family at bedside, along with Dr. Eladia Lin. Patient does meet inpatient criteria for hospice GIP level of care. Patient requiring IV medications for seizure management, agitation and pain. Patient is minimally responsive and unable to tolerate anything oral. Patient with periods of apnea extending over 20 seconds. Family is in agreement with hospice care. Consents are signed. Will notify MD for discharge order to inpatient hospice. If patient were to stabilize we can transition to hospice house. Thank you, 
 
Lesli Crespo RN [employer]

## 2020-01-15 NOTE — PROGRESS NOTES
The documentation for this period is being entered following the guidelines as defined in the [facility] policy by Mabel Vigil.

## 2020-01-15 NOTE — HOSPICE
[facility] 
[phone] Inpatient Nursing Admission Patient Name: Birgit Blanton YOB: 1955 Age: 71 y.o. Date of Hospice Admission: 1/15/2020 Hospice Attending Elected by Patient: Julio Cesar Swartz MD 
Primary Care Physician: Janet Melgar NP Admitting RN: Connor Escobar RN : Shruti Kyle LCSW Level of Care (GIP/Routine/Respite): GIP Facility of Care: [facility] Patient Room: 660/01 HOSPICE SUMMARY  
ER Visits/ Hospitalizations in past year: 1 Hospice Diagnosis: Subdural Hematoma Onset Date of Hospice Diagnosis: January 2020 Summary of Disease Progression Leading to Hospice Diagnosis: Birgit Blanton is a 71 y.o. with a past history of down syndrome, chronic SDH, frequent falls, anxiety, hypothyroidism, who was admitted on 1/2/2020 from home due to [address] [place] [address] with a diagnosis of SDH now s/p craniotomy left frontal SDH post op day 12, brain compression, seizures, AMS, feeding difficutlies. Challenges with agitation and feeding. Pt is grossly aspirating and a peg tube is the only option for long term nutrition at this time. Reports of possible seizures noted. Meds given for agitation and seizures which lead to sedation. Patient minimally responsive now with coarse lung sounds, unable to tolerate anything oral, agitated, pulling at lines. Family has decided to focus on comfort. Restraints and dobhoff removed. PICC line in place to receive IV meds. Co-Morbidities:  
Patient Active Problem List  
Diagnosis Code  SDH (subdural hematoma) ([facility]) S06.5X9A  
 Down's syndrome Q90.9  Acquired hypothyroidism E03.9  Seizures ([facility] [address] R56.9 Diagnoses RELATED to the terminal prognosis: seizures Other Diagnoses: hypothyroidism, downs syndrome Rationale for a prognosis of life expectancy of 6 months or less if the disease follows its normal course (Disease Specific History):  
 
 Chris Manning is a 71 y.o. who was admitted to [facility]. The patient's principle diagnosis of subdural hematoma has resulted in seizure activity,agitation, increased pain, malnutrition, fatigue. Functionally, the patient's Palliative Performance Scale has declined over a period of two weeks and is estimated at 20. Objective information that support this patients limited prognosis includes: EXAM: CT HEAD WO CONT 
  
INDICATION: AMS, L SDH 
  
COMPARISON: None. 
  
CONTRAST: None. 
  
TECHNIQUE: Unenhanced CT of the head was performed using 5 mm images. Brain and 
bone windows were generated. CT dose reduction was achieved through use of a 
standardized protocol tailored for this examination and automatic exposure 
control for dose modulation.   
  
FINDINGS: 
There is persistent shift of the midline to the right by 2.5 mm, previously 3.1 
mm. There is mild hydrocephalus which is stable. There is no significant white 
matter disease. The fluid collection adjacent to the right frontal lobe measures 10.8 mm and previously measured 11.7 mm. The mixed subdural hematoma adjacent to 
left frontal lobe measures 13.1 mm and previously measured 12.9 mm. The mixed 
subdural hematoma adjacent to the left parietal lobe measures 12.2 mm and 
previously measured 12.0 mm. Hematoma adjacent to left frontal lobe measures 9.5 
mm and previously measured 9.3 mm. The basilar cisterns are open. No acute 
infarct is identified. The bone windows demonstrate craniectomy. The visualized 
portions of the paranasal sinuses and mastoid air cells are clear. 
  
IMPRESSION IMPRESSION: Interval slight improvement in midline shift. EEG SUMMARY:  Abnormal EEG due to, 1. Moderate slowing of the background rhythms. 2.  A rare sharp wave discharge seen in the left frontal area. 
  
CLINICAL INTERPRETATION:  This EEG shows a generalized encephalopathic process, nonspecific in type.   There is a rare epileptiform potential seen in the left frontal area which could represent an underlying partial-onset seizure focus. However, no ongoing electrographic or clinical seizure activity was noted during this at 24-hour period of monitoring. The patient/family chose comfort measures with the support of Hospice. Patient meets for GIP LOC as evidenced by agitation, crying at times, requiring IV medications, RN monitoring. Prognosis estimated based on 01/15/20 clinical assessment is:  
[] Few to Many Hours [x] Hours to Days  
[] Few to Many Days  
[] Days to Weeks  
[] Few to Many Weeks  
[] Weeks to Months  
[] Few to Many Months ASSESSMENT Patient self-reports:  []  Yes    [x] No 
 
SYMPTOMS: moaning at times, agitated restlessness in bed, crying, periods of apnea SIGNS/PHYSICAL FINDINGS: hypotensive, periods of apnea lasting over 20 seconds, minimal responsiveness, agitation [place]: 10 
 
FAST for all dementia:   
 
Learning Assessment: 
Patient Is patient willing/able to learn? unable What is the highest level of education completed? Learning preference (written material, demonstration, visual)? Learning barriers (ESOL, [place], poor vision)? Caregiver Is caregiver willing to learn care for patient? yes What is the highest level of education completed? High school Learning preference (written material, demonstration, visual)? written Learning barriers (ESOL, [place], poor vision)? none CLINICAL INFORMATION Wt Readings from Last 3 Encounters:  
01/15/20 56.2 kg (123 lb 14.4 oz) 01/02/20 66 kg (145 lb 8.1 oz) 04/28/18 68 kg (150 lb) Ht Readings from Last 3 Encounters:  
01/06/20 4' 5\" (1.346 m)  
01/02/20 4' 5\" (1.346 m)  
04/28/18 4' 5\" (1.346 m) There is no height or weight on file to calculate BMI. There were no vitals taken for this visit. LAB VALUES No results found for this visit on 01/15/20 (from the past 12 hour(s)). No results found for this visit on 01/15/20 (from the past 6 hour(s)). Lab Results Component Value Date/Time Protein, total 6.4 01/05/2020 12:21 AM  
 Albumin 2.5 (L) 01/05/2020 12:21 AM  
 
 
Currently this patient has: 
[] Supplemental O2 [] Peripheral IV  [x] PICC    [] PORT  
[] Riojas Catheter [] NG Tube   [] PEG Tube [] Ostomy   
[] AICD: Has ICD been deactivated? [] Yes [] No:______ PLAN 1. Admit patient to [facility] inpatient due to unmanaged symptoms including agitation, dyspnea, increased pain and seizure activity. 2. Schedule IV ativan 0.5mg every 6 hours for anxiety, agitation, seizure control. Scheduled Depacon 375mg IVPB every 6 hours for control of seizures. 3. IV morphine 2 mg every 15 minutes as needed for severe pain, dyspnea. 4. IV robinul 0.2mg every 4 hours as needed for secretions. 5. Remove restraints, and turn and reposition patient every 4 hours, oral care as tolerated, PICC line per protocol. 6. Riojas catheter care per protocol. 7. IV toradol 15 mg every 8 hours as needed for fever. 8. Offer support to family, music therapy ordered as patient was a fan of rock and roll Hospice Team Frequency Orders: 
Skilled Nurse - Daily x 7 days /every other day x 7 days  with 5 PRN visits for symptom control. MSW  1 visit for initial assessment/evaluation for family support and need for volunteer services. Josesito Her  1 visit for initial assessment/evaluation for spiritual support. ADVANCE CARE PLANNING (Complete in ACP Flow Sheet) Code Status: DNR Durable DNR: [x]  Yes  []  No 
Code Status Discussed/Confirmed: yes Preference for Other Life Sustaining Treatment Discussed/Confirmed:yes [facility]  [address] [facility] 1/4/2020 Confirm Advance Directive None Samuel Castro [phone]  Service: [] Yes  [x]  No      [] Unknown Appropriate for Pinning Ceremony:  [] Yes     [x] No 
[employer]: [employer]  Home: TBD 
 
DISCHARGE PLANNING 1. Discharge Plan: patient likely to pass at [facility] but if stabilized can transfer to [facility] 2. Patient/Family teaching:end of life signs and symptoms 3. Response to patient/family teaching: reviewed end of life, medications, goals for patient care. Family in agreement with plan. SOCIAL/EMOTIONAL/SPIRITUAL NEEDS Spiritual Issues Identified: none at this time Psych/ Social/ Emotional Issues Identified: Family coping with end of life, sharing wonderful stories of how fun patient was when she was not sick. Noted that patient had been declining for a few years but were grateful for all the time they have had given that she has Down's Syndrome. Caregiver Support: 
[x] Provided information on End of Life Care  
[x] Material Provided: Tiffany Isbell From My Sight or [employer]  
 
CARE COORDINATION Dr. Hima Parker contacted, discharge to hospice order received Dr. David Gamez, contacted, agrees to serve as attending provider for hospice and provided verbal certification of terminal illness with life expectancy of 6 months or less. Orders for hospice admission, medications and plan of treatment received. Medication reconciliation completed. MEDS: See medication list below DME: Per hospital 
Supplies: Per hospital 
IDT communication to include MD, SN, SW, CH and support team 
 
ALLERGIES AND MEDICATIONS Allergies: No Known Allergies No current facility-administered medications for this encounter. Current Facility-Administered Medications Medication Dose Route Frequency  LORazepam (ATIVAN) injection 1 mg  1 mg IntraVENous Q15MIN PRN  
 ketorolac (TORADOL) injection 15 mg  15 mg IntraVENous Q8H PRN  
 glycopyrrolate (ROBINUL) injection 0.2 mg  0.2 mg IntraVENous Q4H PRN  
 bisacodyL (DULCOLAX) suppository 10 mg  10 mg Rectal Q72H PRN  
 morphine injection 2 mg  2 mg IntraVENous Q15MIN PRN  
 LORazepam (ATIVAN) injection 1 mg  1 mg IntraVENous Q6H  
  Depacon 375 mg IVPB every 6 hours

## 2020-01-15 NOTE — PROGRESS NOTES
Problem: Falls - Risk of 
Goal: *Absence of Falls Description Document Bharathi Arechiga Fall Risk and appropriate interventions in the flowsheet. Outcome: Progressing Towards Goal 
Note: Fall Risk Interventions: 
Mobility Interventions: Bed/chair exit alarm, Communicate number of staff needed for ambulation/transfer Mentation Interventions: Evaluate medications/consider consulting pharmacy, Adequate sleep, hydration, pain control Medication Interventions: Evaluate medications/consider consulting pharmacy, Patient to call before getting OOB Elimination Interventions: Call light in reach, Bed/chair exit alarm History of Falls Interventions: Evaluate medications/consider consulting pharmacy, Bed/chair exit alarm Problem: Patient Education: Go to Patient Education Activity Goal: Patient/Family Education Outcome: Progressing Towards Goal 
  
Problem: Non-Violent Restraints Goal: *Removal from restraints as soon as assessed to be safe Outcome: Progressing Towards Goal 
Goal: *No harm/injury to patient while restraints in use Outcome: Progressing Towards Goal 
  
Problem: Hemorrhagic Stroke: Day 5 through Discharge Goal: Activity/Safety Outcome: Progressing Towards Goal 
Goal: Diagnostic Test/Procedures Outcome: Progressing Towards Goal 
Goal: Medications Outcome: Progressing Towards Goal 
Goal: Treatments/Interventions/Procedures Outcome: Progressing Towards Goal

## 2020-01-15 NOTE — ROUTINE PROCESS
Bedside/ Verbal shift change report given to Erich Logan RN  (oncoming nurse) by Jose Dimas RN  (offgoing nurse). Report included the following information SBAR, Kardex, ED Summary, Procedure Summary, Intake/Output, MAR, Recent Results, Cardiac Rhythm NSR. , Alarm Parameters , Pre Procedure Checklist, Procedure Verification, Quality Measures and Dual Neuro Assessment.

## 2020-01-15 NOTE — PROGRESS NOTES
Neurosurgery Progress Note Edgar Haider ACNP-BC 
[phone] Admit Date: 2020 LOS: 13 days Daily Progress Note: 1/15/2020 POD: 12 Day Post-Op S/P: Procedure(s): CRANIOTOMY LEFT FRONTAL FOR SUBDURAL HEMATOMA 
 
HPI: The patient has a history of Down's syndrome. She was brought to the ER by caregivers due to lethargy and confusion. She also has been having balance issues and difficulty standing recently. She was found to have a large chronic SDH on the left with membranes in it. She transferred to [facility] where she was taken to the OR by Dr. Peter Bob for a craniotomy to evacuate the SDH. Subjective: Pt awake, trying to throw her legs off the bed this morning. She was also leaning forward, trying to get her head to her hands, so she could pull on her Dobhoff tube. She starts to cry because she doesn't understand why her hands are restrained. No sitter in the room this morning. Unable to obtain ROS due to AMS. Objective:  
 
Vital signs Temp (24hrs), Av.8 °F (36.6 °C), Min:97.4 °F (36.3 °C), Max:98.2 °F (36.8 °C) 
 01/15 07 - 01/15 190 In: 180 Out: -   1901 - 01/15 07 In:  Out: 925 [Urine:925] Visit Vitals /40 (BP 1 Location: Left arm, BP Patient Position: At rest) Pulse 67 Temp 97.4 °F (36.3 °C) Resp 18 Ht 4' 5\" (1.346 m) Wt 56.2 kg (123 lb 14.4 oz) SpO2 100% BMI 31.01 kg/m² O2 Flow Rate (L/min): 2 l/min O2 Device: Room air Pain control Pain Assessment Pain Scale 1: Adult Nonverbal Pain Scale Pain Intensity 1: 0 
 
PT/OT Gait     Gait Base of Support: Widened, Center of gravity altered Speed/Sharmila: Shuffled, Slow Gait Abnormalities: Altered arm swing, Decreased step clearance, Shuffling gait, Trunk sway increased Ambulation - Level of Assistance: Assist x2, Moderate assistance Distance (ft): 20 Feet (ft) Assistive Device: Gait belt(B HHA) Physical Exam: 
Gen:NAD. Neuro: Awake. Eyes open. Does not follow commands. Not talking to me. LOVE spontaneously just not to command. Pupils pinpoint. Right eye medial deviation. Dysconjugate gaze. Skin: Left scalp incision C/D/I with staples in place. CT head without contrast on 01/02/20 shows predominantly hypodense left cerebral convexity subdural fluid collection with hyperdense membranes, most consistent with a chronic subdural hematoma. Mass effect results in 10 mm of rightward midline shift and descending transtentorial herniation with effacement of the suprasellar cistern. No large acute component is identified within the subdural hematoma. Additional small right cerebral convexity hypodense subdural fluid collection measuring up to 8 mm in maximal thickness, most consistent with an additional chronic subdural hematoma. CT head without contrast on 01/03/20 shows interval left frontal craniotomy and drainage of large left multiloculated chronic subdural hematoma. Postoperative findings on the left. Residual right frontal/temporal convexity chronic subdural hematoma without definite interval change. Near complete return of central structures to the midline. CT head without contrast on 01/04/20 shows expected postoperative changes of left subdural hematoma evacuation as above, with no significant change in size of mixed density left subdural hematoma with acute hyperdense components. Stable chronic right subdural hematoma. Stable minimal 2 mm of rightward midline shift. CT head without contrast on 01/07/20 shows slight interval increase in left subdural hematoma status post removal of drainage catheter, with slight increase of midline shift to the right. CT head without contrast on 01/08/202 shows interval slight improvement in midline shift. 24 hour results: 
 
Recent Results (from the past 24 hour(s)) GLUCOSE, POC Collection Time: 01/14/20 11:37 PM  
Result Value Ref Range Glucose (POC) 107 (H) 65 - 100 mg/dL Performed by Stacey Stephenson GLUCOSE, POC Collection Time: 01/15/20  5:30 AM  
Result Value Ref Range Glucose (POC) 92 65 - 100 mg/dL Performed by Stacey Stephenson CBC WITH AUTOMATED DIFF Collection Time: 01/15/20  5:40 AM  
Result Value Ref Range WBC 4.6 3.6 - 11.0 K/uL  
 RBC 3.96 3.80 - 5.20 M/uL  
 HGB 12.2 11.5 - 16.0 g/dL HCT 37.9 35.0 - 47.0 % MCV 95.7 80.0 - 99.0 FL  
 MCH 30.8 26.0 - 34.0 PG  
 MCHC 32.2 30.0 - 36.5 g/dL  
 RDW 13.0 11.5 - 14.5 % PLATELET 011 414 - 090 K/uL MPV 10.5 8.9 - 12.9 FL  
 NRBC 0.0 0  WBC ABSOLUTE NRBC 0.00 0.00 - 0.01 K/uL NEUTROPHILS 62 32 - 75 % LYMPHOCYTES 19 12 - 49 % MONOCYTES 13 5 - 13 % EOSINOPHILS 5 0 - 7 % BASOPHILS 1 0 - 1 % IMMATURE GRANULOCYTES 0 0.0 - 0.5 % ABS. NEUTROPHILS 2.8 1.8 - 8.0 K/UL  
 ABS. LYMPHOCYTES 0.9 0.8 - 3.5 K/UL  
 ABS. MONOCYTES 0.6 0.0 - 1.0 K/UL  
 ABS. EOSINOPHILS 0.2 0.0 - 0.4 K/UL  
 ABS. BASOPHILS 0.1 0.0 - 0.1 K/UL  
 ABS. IMM. GRANS. 0.0 0.00 - 0.04 K/UL  
 DF AUTOMATED METABOLIC PANEL, BASIC Collection Time: 01/15/20  5:40 AM  
Result Value Ref Range Sodium 134 (L) 136 - 145 mmol/L Potassium 4.2 3.5 - 5.1 mmol/L Chloride 99 97 - 108 mmol/L  
 CO2 29 21 - 32 mmol/L Anion gap 6 5 - 15 mmol/L Glucose 119 (H) 65 - 100 mg/dL BUN 18 6 - 20 MG/DL Creatinine 0.73 0.55 - 1.02 MG/DL  
 BUN/Creatinine ratio 25 (H) 12 - 20 GFR est AA >60 >60 ml/min/1.73m2 GFR est non-AA >60 >60 ml/min/1.73m2 Calcium 7.9 (L) 8.5 - 10.1 MG/DL  
VALPROIC ACID Collection Time: 01/15/20  6:55 AM  
Result Value Ref Range Valproic acid 81 50 - 100 ug/ml GLUCOSE, POC Collection Time: 01/15/20 11:52 AM  
Result Value Ref Range Glucose (POC) 120 (H) 65 - 100 mg/dL Performed by Sachi Brown Assessment:  
 
Principal Problem: 
  [facility]H [facility] ([facility] (1/2/2020) Active Problems: 
  Down's syndrome (1/2/2020) Acquired hypothyroidism (1/2/2020) Seizures ([facility] (1/6/2020) Plan: 1. Chronic subdural hematoma - s/p crani 01/02 
 - neuro checks every 4 hours 
 - PT/OT/Speech as able 
 -Family has decided to pursue hospice - If family is not present, pt needs a sitter in the room with her because she attempts to get out of bed and pull on her dobhoff which has been bridled and also does not understand why certain things are happening to her due to her Down's syndrome. 2. Brain compression 
 - due to #1 
 - plans as above 3. Down's syndrome - Sitter for patient safety - Supportive care 4. Hypothyroidism  
 - cont levothyroxine from home 5. Seizures - Neurology following - Keppra changed to Depakote - Ativan PRN 6. Acute metabolic encephalopathy 
 - due to #5 and likely due to medications given for seizures - plans as above 
 - slowly improving 
 - adjusted seroquel doses to home dosing of seroquel 12.5 mg in am and 25 mg at bedtime. 7. Feeding difficulties - comfort feeds as able accepting risk of aspiration 
 - hospice 
 - no PEG tube Activity: up with assist 
DVT ppx: SCDs Dispo: hospice Plan d/w Dr. Corrina Merrill. Will remove staples. Agree with hospice plan.  
 
 
Jeremiah Valerio NP

## 2020-01-15 NOTE — PROGRESS NOTES
Palliative Medicine Consult Verma: [phone] ([phone]) Patient Name: Slime Norton YOB: 1955 Date of Initial Consult: 2020 Reason for Consult: Care Decisions Requesting Provider: Dr. Charlene Preston Primary Care Physician: Michael Booker, NP 
 
 SUMMARY:  
Slime Norton is a 71 y.o. with a past history of down syndrome, chronic SDH, frequent falls, anxiety, hypothyroidism, who was admitted on 2020 from home due to [facility] with a diagnosis of SDH now s/p craniotomy left frontal SDH post op day 12, brain compression, seizures, AMS, feeding difficutlies. Lizzy Riser Challenges with agitation and feeding. Pt is grossly aspirating and a peg tube is the only option for long term nutrition at this time. Reports of possible seizures noted. Meds given for agitation and seizures which lead to sedation Current medical issues leading to Palliative Medicine involvement include: support with care decisions given pt unable to tolerate oral.  She has been in restraints since surgery. High risk of dislodging peg if placed. Family would like to care for the pt at home. PALLIATIVE DIAGNOSES:  
1. Agitation 2. Feeding difficulties 3. Aspiration Risk 4. Fall risk PLAN:  
1. Family meeting held with brother~Samuel and sister in law~Lona 2. Followed up on conversation held yesterday regarding care goals 3. After much reflection family agree with Hospice enrollment 4. They would like to take her home. They expressed concerns managing the patient's agitation at home and seizures 5. They understand the pt's dobhoff tube will be pulled and there will be no tubes for feeding 6. Explained to the family that meds will need to be given sublingual.  Alexa Ramesh says he is familiar b/c he did that for his mother who  with hospice care 7. All questions and concerns addressed 8. Hospice consult placed. CM,attending, neuro sx notified 9. The pt has CNA's with medicaid. This will need to be in place prior to dc as the family is going to need help with care. Pt may qualify for [facility] given agitation and seizures. 10. Initial consult note routed to primary continuity provider and/or primary health care team members 11. Communicated plan of care with: Palliative IDT, [facility] Team 
 
 GOALS OF CARE / TREATMENT PREFERENCES:  
 
GOALS OF CARE: 
Patient/Health Care Proxy Stated Goals: Comfort TREATMENT PREFERENCES:  
Code Status: Full Code Advance Care Planning: 
[x] The [facility] Interdisciplinary Team has updated the ACP Navigator with Timothy and Patient Capacity Primary Decision Maker (Active): Samuel Castro   - [phone] Advance Care Planning 1/4/2020 Confirm Advance Directive None Medical Interventions: Comfort measures Other Instructions:  
Artificially Administered Nutrition: No feeding tube Other: As far as possible, the palliative care team has discussed with patient / health care proxy about goals of care / treatment preferences for patient. HISTORY:  
 
History obtained from: chart, family, team 
 
CHIEF COMPLAINT: pt admitted with aforementioned history and issues HPI/SUBJECTIVE: The patient is:  
[] Verbal and participatory [x] Non-participatory due to:  
Medical condition Clinical Pain Assessment (nonverbal scale for severity on nonverbal patients):  
Clinical Pain Assessment Severity: 0 Activity (Movement): Lying quietly, normal position Duration: for how long has pt been experiencing pain (e.g., 2 days, 1 month, years) Frequency: how often pain is an issue (e.g., several times per day, once every few days, constant) FUNCTIONAL ASSESSMENT:  
 
Palliative Performance Scale (PPS): PPS: 30 
 
 
 PSYCHOSOCIAL/SPIRITUAL SCREENING:  
 
Palliative IDT has assessed this patient for cultural preferences / practices and a referral made as appropriate to needs (Cultural Services, Patient Advocacy, Ethics, etc.) Any spiritual / Mandaeism concerns: 
[] Yes /  [x] No 
 
Caregiver Burnout: 
[] Yes /  [x] No /  [] No Caregiver Present Anticipatory grief assessment:  
[x] Normal  / [] Maladaptive ESAS Anxiety: Anxiety: 0 
 
ESAS Depression:    
 
 
 REVIEW OF SYSTEMS:  
 
Positive and pertinent negative findings in ROS are noted above in HPI. The following systems were [x] reviewed objectively / [] unable to be reviewed as noted in HPI Other findings are noted below. Systems: constitutional, ears/nose/mouth/throat, respiratory, gastrointestinal, genitourinary, musculoskeletal, integumentary, neurologic, psychiatric, endocrine. Positive findings noted below. Modified ESAS Completed by: provider Fatigue: 9 Drowsiness: 9 Pain: 0 Anxiety: 0 Nausea: 0 Anorexia: 10 Dyspnea: 0 Stool Occurrence(s): 1 PHYSICAL EXAM:  
 
From RN flowsheet: 
Wt Readings from Last 3 Encounters:  
01/15/20 123 lb 14.4 oz (56.2 kg) 01/02/20 145 lb 8.1 oz (66 kg) 04/28/18 150 lb (68 kg) Blood pressure 102/55, pulse 85, temperature 97.4 °F (36.3 °C), resp. rate 17, height 4' 5\" (1.346 m), weight 123 lb 14.4 oz (56.2 kg), SpO2 100 %. Pain Scale 1: Adult Nonverbal Pain Scale Pain Intensity 1: 0 Last bowel movement, if known:  
 
Constitutional: frail, sitting up in bed with eyes closed, nad, restrained Eyes: anicteric ENMT: no nasal discharge, moist mucous membranes Cardiovascular: regular rhythm, distal pulses intact Respiratory: breathing not labored, symmetric Gastrointestinal: soft non-tender, +bowel sounds Musculoskeletal:  no tenderness to palpation Skin: warm, dry Neurologic: Downs Syndrome Psychiatric: agitation Other: 
 
 
 HISTORY:  
 
Principal Problem: 
  SDH (subdural hematoma) ([facility] (1/2/2020) Active Problems: 
  Down's syndrome (1/2/2020) Acquired hypothyroidism (1/2/2020) Seizures ([facility] (1/6/2020) Past Medical History:  
Diagnosis Date  Endocrine disease   
 hypothyroidism  Psychiatric disorder Down Syndrome Past Surgical History:  
Procedure Laterality Date  HX CHOLECYSTECTOMY History reviewed. No pertinent family history. History reviewed, no pertinent family history. Social History Tobacco Use  Smoking status: Never Smoker  Smokeless tobacco: Never Used Substance Use Topics  Alcohol use: No  
 
No Known Allergies Current Facility-Administered Medications Medication Dose Route Frequency  QUEtiapine (SEROquel) tablet 25 mg  25 mg Oral QHS  QUEtiapine (SEROquel) tablet 12.5 mg  12.5 mg Oral DAILY  valproic acid (as sodium salt) (DEPAKENE) 250 mg/5 mL (5 mL) oral solution 500 mg  500 mg Oral Q8H  
 traZODone (DESYREL) tablet 50 mg  50 mg Oral QHS  bisacodyl (DULCOLAX) suppository 10 mg  10 mg Rectal DAILY PRN  
 senna (SENOKOT) tablet 17.2 mg  2 Tab Oral DAILY  docusate (COLACE) 50 mg/5 mL oral liquid 100 mg  100 mg Oral DAILY  famotidine (PEPCID) 40 mg/5 mL (8 mg/mL) oral suspension 20 mg  20 mg Per NG tube BID  levothyroxine (SYNTHROID) tablet 75 mcg  75 mcg Per NG tube DAILY  acetaminophen (TYLENOL) suppository 650 mg  650 mg Rectal Q4H PRN  
 dextrose 10 % infusion 125-250 mL  125-250 mL IntraVENous PRN  
 miconazole (MICOTIN) 2 % powder   Topical BID  sodium chloride (NS) flush 5-40 mL  5-40 mL IntraVENous Q8H  
 sodium chloride (NS) flush 5-40 mL  5-40 mL IntraVENous PRN  
 acetaminophen (TYLENOL) solution 650 mg  650 mg Oral Q4H PRN  
 ondansetron (ZOFRAN) injection 4 mg  4 mg IntraVENous Q4H PRN  
 sodium chloride (NS) flush 5-40 mL  5-40 mL IntraVENous Q8H  
 sodium chloride (NS) flush 5-40 mL  5-40 mL IntraVENous PRN  
 naloxone (NARCAN) injection 0.4 mg  0.4 mg IntraVENous PRN  
 
 
 
 LAB AND IMAGING FINDINGS:  
 
Lab Results Component Value Date/Time WBC 4.6 01/15/2020 05:40 AM  
 HGB 12.2 01/15/2020 05:40 AM  
 PLATELET 765 70/32/6232 05:40 AM  
 
Lab Results Component Value Date/Time Sodium 134 (L) 01/15/2020 05:40 AM  
 Potassium 4.2 01/15/2020 05:40 AM  
 Chloride 99 01/15/2020 05:40 AM  
 CO2 29 01/15/2020 05:40 AM  
 BUN 18 01/15/2020 05:40 AM  
 Creatinine 0.73 01/15/2020 05:40 AM  
 Calcium 7.9 (L) 01/15/2020 05:40 AM  
 Magnesium 2.4 01/14/2020 02:56 AM  
 Phosphorus 3.2 01/14/2020 02:56 AM  
  
Lab Results Component Value Date/Time AST (SGOT) 21 01/05/2020 12:21 AM  
 Alk. phosphatase 83 01/05/2020 12:21 AM  
 Protein, total 6.4 01/05/2020 12:21 AM  
 Albumin 2.5 (L) 01/05/2020 12:21 AM  
 Globulin 3.9 01/05/2020 12:21 AM  
 
Lab Results Component Value Date/Time INR 1.1 01/03/2020 05:18 AM  
 Prothrombin time 11.1 01/03/2020 05:18 AM  
 aPTT 23.6 01/03/2020 05:18 AM  
  
No results found for: IRON, FE, TIBC, IBCT, PSAT, FERR No results found for: PH, PCO2, PO2 No components found for: [place] No results found for: CPK, CKMB Total time: 45 min Counseling / coordination time, spent as noted above: 40 min 
> 50% counseling / coordination?: y 
Prolonged service was provided for  []30 min   []75 min in face to face time in the presence of the patient, spent as noted above. Time Start:  
Time End:  
Note: this can only be billed with 60836 (initial) or 18214 (follow up). If multiple start / stop times, list each separately.

## 2020-01-15 NOTE — PROGRESS NOTES
Speech pathology note Reviewed chart and note plan for hospice. Consider allowing PO intake for comfort despite known aspiration risk. Skilled SLP treatment no longer indicated, and SLP will sign off. Thank you. Cynthia Darden., CCC-SLP

## 2020-01-16 NOTE — [facility] IDG OTHER NOTES
IDG -- BEREAVEMENT NOTE: 
During the interdisciplinary group meeting on 1/16/2020, the primary care team reviewed the patient's admission, and bereavement was discussed. It was confirmed that Ilia Thomas (brother) is the primary bereaved at low risk level. It was noted that he and his wife have been the caregiver for the patient as she has lived with them for the last six years, and they provide care for additional family members.

## 2020-01-16 NOTE — [facility] IDG NURSE NOTES
Julian Carter a 71 y. o. with a past history of down syndrome, chronic SDH, frequent falls, anxiety, hypothyroidism, who was admitted on 1/2/2020 from home due to AMS with a diagnosis of SDH now s/p craniotomy left frontal SDH post op day 12, brain compression, seizures, AMS, feeding difficutlies. Challenges with agitation and feeding.  Pt is grossly aspirating and a peg tube is the only option for long term nutrition at this time. Reports of possible seizures noted.  Meds given for agitation and seizures which lead to sedation. Patient minimally responsive now with coarse lung sounds, unable to tolerate anything oral, agitated, pulling at lines. Family has decided to focus on comfort. Restraints and dobhoff removed. PICC line in place to receive IV meds. 1. Admit patient to [facility] inpatient due to unmanaged symptoms including agitation, dyspnea, increased pain and seizure activity. 2. Schedule IV ativan 0.5mg every 6 hours for anxiety, agitation, seizure control. Scheduled Depacon 375mg IVPB every 6 hours for control of seizures. 3. IV morphine 2 mg every 15 minutes as needed for severe pain, dyspnea. 4. IV robinul 0.2mg every 4 hours as needed for secretions. 5. Remove restraints, and turn and reposition patient every 4 hours, oral care as tolerated, PICC line per protocol. 6. Riojas catheter care per protocol. 7. IV toradol 15 mg every 8 hours as needed for fever. 8. Offer support to family, music therapy ordered as patient was a fan of rock and roll [facility]

## 2020-01-16 NOTE — HOSPICE
This was an initial visit to assess needs and offer support. Zully Cagle appeared to be resting comfortably. There was no family present. I called her brother, Garland Molina, to introduced myself. He says that he is not certain how we can best support his sister but was appreciative of my offer to simply sit with her for a while as he and his wife will not be here for a couple of hours. He noted that this has been a difficult couple of years for the family. There have been several deaths in his family and his wife's family the last couple of years, including his mother and his wife has a doctors visit tomorrow. Offered supportive listening and encouraged him to take good care of himself and to encourage his wife to do the same. They will be coming up later today and tomorrow but not sure about time due to his wife' appointment. Assured him of our availably as needed/desired. Following our call I set with Zully Cagle for a while.

## 2020-01-16 NOTE — HOSPICE
[facility] Good Help to Those in Need 
[phone] GIP Daily Nursing Note Patient Name: Abena Petersen YOB: 1955 Age: 71 y.o. Date of Visit: 01/16/20 Facility of Care: [facility] Patient Room: 206/01 Hospice Attending: Carliot Cooley MD 
Hospice Diagnosis: SDH (subdural hematoma) ([facility] [Q13.6E0R] Level of Care: GIP Current GIP Symptoms 1. Anxiety/agitation/restlessness 2. Apnea/secretions 3. Generalized pain 4. Decreased responsiveness ASSESSMENT & PLAN Plan of Care: 1. Education of patient, family and staff re end of life care 2. Provide support and frequent rounds for patient comfort and safety 3. Provide  support ongoing 4. Provide  and bereavement support ongoing 5. Continue with IV Medications 6. Schedule Lorazepam 1mg IV from Q6 hours to Q 4 hours with PRN dose of 1mg Q 15 minutes 7. Add Morphine 2mg IV Q 15 minutes 8. Use Robinul IV PRN 9. Continue wit hIV Depacon for seizure management 10. PRN IV toradol for fever 11. Monitor for constipation. 12. Maintain skin integrity as tolerated for hospice patient Spiritual Interventions:Hospice Chaplain visited bedside. See Estelita Krishna note from today Psych/ Social/ Emotional Interventions: Ongoing care from Hospice social worker. See Stanislaw Diaz LCSW note from today. Care Coordination Needs: Hospice arranging transportation to [facility] for this evening at 19:00. Liaison to call report to [facility] staff. Unit nurse to call report to [facility] as well. [phone] Care plan and New Orders discussed / approved with Allegra Jo MD. Description History and Chart Review If this is initial GIP note must document RN assessment/MD communication in previous setting. Specifically document nursing/medication needs in last 24 hours to support GIP care Narrative History of last 24 hours that demonstrates care cannot be provided in another setting: IV medications for pain, secretions, restlessness and air hunger. Riojas care, Frequent rounding and bedside nurse support and education for patient and family. What has been done to control the patient's symptoms in the last 24 hours? Iv Lorazepam, Morphine IV, Depacon IVPB Does the patient currently require IV medications? YES Does the patient currently require scheduled medications? YES Does the patient currently require a PCA? NO List number of doses of PRN medications in last 24 hours: 
Medication 1:Lorazepam 1mg IV Number of doses:  1 Medication 2:  
Number of doses: 
 
Medication 3:  
Number of doses: Supporting documentation for GIP need for pain control: 
[x] Frequent evaluation by a doctor, nurse practitioner, nurse  
[x] Frequent medication adjustment   
[x] IVs that cannot be administered at home  
[] Aggressive pain management  
[] Complicated technical delivery of medications Supporting documentation for GIP need for symptom control: 
[x]  Sudden decline necessitating intensive nursing intervention 
[]  Uncontrolled / intractable nausea or vomiting  
[]  Pathological fractures 
[]  Advanced open wounds requiring frequent skilled care 
[] Unmanageable respiratory distress 
[] New or worsening delirium  
[] Delirium with behavior issues: Is 24 hour caregiver present due to safety concerns with agitation? (yes/no) [] Imminent death  with skilled nursing needs documented above DISCHARGE PLANNING 1. Discharge Plan: Pt to transfer to [facility] this evening. If patient stabilizes, family would like for patient to return home with them. 2. Patient/Family teaching: Pt brother, Eren Velez, and Samuel's wife, Katalina Umanzoring bedside during hospice visit with liaison and Dr. Mayra Borjas. 3. Response to patient/family teaching: Family in agreement with this plan of care, and transfer to [facility] today.  
 
ASSESSMENT   
KARNOFSKY: 20 
 
 Prognosis estimated based on 01/16/20 clinical assessment is:  
 
[x] Few to Many Days Quality Measure: Patient self-reports:  [] Yes    [x] No 
 
Adult Non-Verbal Pain Assessment Score: 6/10 Face 
[] 0   No particular expression or smile 
[] 1   Occasional grimace, tearing, frowning, wrinkled forehead 
[x] 2   Frequent grimace, tearing, frowning, wrinkled forehead Activity (movement) [] 0   Lying quietly, normal position 
[] 1   Seeking attention through movement or slow, cautious movement 
[x] 2   Restless, excessive activity and/or withdrawal reflexes Guarding 
[] 0   Lying quietly, no positioning of hands over areas of body [x] 1   Splinting areas of the body, tense 
[] 2   Rigid, stiff Physiology (vital signs) [x] 0   Stable vital signs [] 1   Change in any of the following: SBP > 20mm Hg; HR > 20/minute 
[] 2   Change in any of the following: SBP > 30mm Hg; HR > 25/minute Respiratory 
[] 0   Baseline RR/SpO2, compliant with ventilator 
[x] 1   RR > 10 above baseline, or 5% drop SpO2, mild asynchrony with ventilator 
[] 2   RR > 20 above baseline, or 10% drop SpO2, asynchrony with ventilator CLINICAL INFORMATION Patient Vitals for the past 12 hrs: 
 Temp Pulse Resp BP SpO2  
01/16/20 1506 96.5 °F (35.8 °C) 68 12 101/49 90 % 01/16/20 0846 96.5 °F (35.8 °C) (!) 57 12 (!) 88/52 94 % Currently this patient has: 
[] Supplemental O2  
[] IV [x] PICC [] PORT  
[] NG Tube   
[] PEG Tube  
[] Ostomy    
[x] Riojas draining _dark clear urine______ urine 
[] Other:  
 
SIGNS/PHYSICAL FINDINGS Skin (including wound): 
[] Warm, dry, supple, intact and color normal for race [x] Warm  
[] Dry  
[] Cool    
[] Clammy      
[] Diaphoretic Turgor [x] Normal 
 [] Decreased Color: 
 [] Pink [x] Pale 
 [] Cyanotic 
 [] Erythema 
 [] Jaundice [] Normal for Race 
[]  Wounds: 
 
Neuro: 
[x] Lethargy 
[x] Restlessness / agitation 
[] Confusion / delirium [] Hallucinations 
[] Responds to maximal stimulation 
[] Unresponsive 
[] Seizures Cardiac: 
[] Dyspnea on Exertion 
[] JVD [] Murmur 
[] Palpitations [] Hypotension 
[] Hypertension 
[x] Tachycardia [] Bradycardia 
[] Irregular HR 
[] Pulses Decreased 
[] Pulses Absent [x] Edema:    generalized 
[] Mottling:      (Location) Respiratory: 
Breath sounds:  
 [] Diminished 
 [] Wheeze [x] Rhonchi 
 [] Rales [x] Even and unlabored 
[] Labored:           
[] Cough 
 [] Non Productive 
 [] Productive 
  [] Description:          
[] Deep suctioned  
[] O2 at ___ LPM 
[] High flow oxygen greater than 10 LPM 
[] Bi-Pap GI [x] Abdomen soft non-tender  
[] Ascites 
[] Nausea 
[] Vomiting 
[x] Incontinent of bowels 
[] Bowel sounds (yes/no) [] Diarrhea 
[] Constipation (see above including last bowel movement) [] Checked for impaction 
[] Last BM Nutrition Diet:_______Non-responsive___ Appetite:  
[] Good  
[] Fair  
[x] Poor  
[] Tube Feeding  
[] Voiding 
[] Incontinent [x] Riojas Musculoskeletal 
[] Balance/Clifford Unsteady  
[] Weak Strength:  
 [] Normal  
 [x] Limited  
 [] Decreasing Activities:  
 [] Up as tolerated 
 [x] Bedridden  
 [] Specify: 
 
SAFETY [x] 24 hr. Caregiver [x] Side rails ? [x] Hospital bed  
[x] Reviewed Falls & Safety ALLERGIES AND MEDICATIONS Allergies: No Known Allergies Current Facility-Administered Medications Medication Dose Route Frequency  LORazepam (ATIVAN) injection 1 mg  1 mg IntraVENous Q4H  
 morphine injection 2 mg  2 mg IntraVENous NOW  
 LORazepam (ATIVAN) injection 1 mg  1 mg IntraVENous Q15MIN PRN  
 ketorolac (TORADOL) injection 15 mg  15 mg IntraVENous Q8H PRN  
 glycopyrrolate (ROBINUL) injection 0.2 mg  0.2 mg IntraVENous Q4H PRN  
 bisacodyL (DULCOLAX) suppository 10 mg  10 mg Rectal Q72H PRN  
 morphine injection 2 mg  2 mg IntraVENous Q15MIN PRN  
  valproate (DEPACON) 375 mg in 0.9% sodium chloride 50 mL IVPB  375 mg IntraVENous Q6H Visit Time In: 15:00 Visit Time Out: 17:00 Noemí Orona RN, [facility] Hospice Nurse Liaison [phone] [place] [phone] Office

## 2020-01-16 NOTE — PROGRESS NOTES
[facility] Good Help to Those in Need 
[phone] Patient Name: Marlena Pate YOB: 1955 Date of Provider Hospice Visit: 01/16/20 Level of Care:   [x] General Inpatient (GIP)    [] Routine   [] Respite Current Location of Care: 
[] [facility] [] [facility] [] [address] [] [facility] [] [facility], patient referred from: 
[] [facility] [] [facility] [] [address] [] [facility] [] Home [] Other:  
 
Date of Original Hospice Admission: 1/15/20 Hospice Medical Director at time of admission: Perlita Herrera MD 
 
Principle Hospice Diagnosis: Subdural hematoma Diagnoses RELATED to the terminal prognosis: Metabolic encephalopathy Seizures Other Diagnoses:  
Down Syndrome Hypothyroidism Kavya Pate is a 71y.o. year old who was admitted to [facility]. She has down syndrome, chronic SDH, frequent falls, anxiety, hypothyroidism and was admitted to [facility] on 1/2/2020 from home due to [address] [place] [address] with a diagnosis of SDH now s/p craniotomy for left frontal SDH. Her post-operative course has been complicated by fluctuating mentation, seizures, agitation/restlessness, and dysphagia/aspiration. She was receiving supplemental nutrition with dobhoff tube until she pulled it out. The family met with Palliative to discuss [address]. Risks and beneftis of g-tube were discussed, and family believed she would be agitated by this and would likely pull it as she did the dobhoff. The family decided to pursue comfort-focused care with hospice. At time of my exam pt is unresponsive with PPS 10%, significant restlessness, intermittent apnea, and has an estimated prognosis of hours to days. The patient/family chose comfort measures with the support of Hospice. Objective information:  
CT Head 1/8/20: 
FINDINGS: 
There is persistent shift of the midline to the right by 2.5 mm, previously 3.1 
mm. There is mild hydrocephalus which is stable. There is no significant white matter disease. The fluid collection adjacent to the right frontal lobe measures 10.8 mm and previously measured 11.7 mm. The mixed subdural hematoma adjacent to 
left frontal lobe measures 13.1 mm and previously measured 12.9 mm. The mixed 
subdural hematoma adjacent to the left parietal lobe measures 12.2 mm and 
previously measured 12.0 mm. Hematoma adjacent to left frontal lobe measures 9.5 
mm and previously measured 9.3 mm. The basilar cisterns are open. No acute 
infarct is identified. The bone windows demonstrate craniectomy. The visualized 
portions of the paranasal sinuses and mastoid air cells are clear. IMPRESSION: Interval slight improvement in midline shift. HOSPICE ASSESSMENT Active Symptoms and Issues: 
-Anxiety/agitation/restlessness: improved, on my exam was a bit restless--dose effect seems to have declined around 4 hours after receiving last dose 
-Seizures: reported as facial twitching, was recently changed from keppra to depakote d/t concern that keppra was causing excessive drowsiness 
-Apnea: intermittent 
-Generalized pain: intermittent grimace per family report 
-Secretions 
-Decreased responsiveness/unresponsiveness PLAN 1. GIP level of care needed for progressive worsening symptoms of seizures, restlessness necessitating frequent skilled nursing assessment and administration of parenteral medications. Needs monitoring for need to titrate sx mgt regimen for optimization of comfort. Pt is at high risk of rapid decline and death due to terminal disease process. 2. Symptom management medications: 1. Morphine 2 mg IV [id number] prn pain, air hunger. After exam and talking with family, asked nurse to administer dose of morphine so that we can monitor her response--pain may be contributing to restlessness. If this seems helpful for her, then would likely schedule.  
2. As dose effect seems to be wearing off earlier then current scheduled regimen, will increase frequency of ativan 1 mg IV from Q6H to Q4H +q15 mins prn anxiety/agitation/restlessness/respiratory distress. Will monitor for need for further titration. Wrist restraint was removed last evening. 3. Have converted oral depakote to IV depacon as patient has experienced seizures this admission and is at risk of further seizures. Can also consider discontinuing depacon and titrating scheduled ativan dose to one effective at preventing clinically-significant seizures. 3. Glycopyrrolate prn to help prevent formation of new secretions. Gentle anterior suction of secretions okay (i.e. suction around lips/teeth). Recommend avoiding deep suction to prevent irritation, pain, bleeding. 4. Prn bisacodyl for constipation 5. Prn toradol and/or tylenol for fever 6.  and SW to support family needs 7. Disposition: anticipate she will need [facility] LOC for eol d/t her care requirements including regular parenteral medication administration--she will be transferred to [facility] for ongoing care later today 8. Hospice Plan of care was reviewed in detail and agree with current plan of care Prognosis estimated based on 01/16/20 clinical assessment is:  
[x] Hours to Days   
[] Days to Weeks   
[] Other: 
 
Communicated plan of care with: Hospice Case Manager; Hospice IDT; Care Team 
 
 GOALS OF CARE Patient/Medical POA stated Goal of Care: optimize patient comfort [x] I have reviewed and/or updated ACP information in the Advance Care Planning Navigator. This information is available in the [facility] [address] link in the patient's chart header. Primary Medical Decision Maker:   Primary Decision Maker (Active): Paulette Gonzalez - [phone] Resuscitation Status: DNR If DNR is there a Durable DNR on file? : [x] Yes [] No (If no, complete Durable DNR) HISTORY History obtained from: chart review, hospice liason, hospice SW, patient's brother CHIEF COMPLAINT: patient cannot give as unresponsive The patient is:  
[] Verbal 
[] Nonverbal 
[x] Unresponsive HPI/SUBJECTIVE:   
Brother states patient has overall been more comfortable since medication changes She is having some persistent restlessness this afternoon Family has noticed an occasional grimace REVIEW OF SYSTEMS The following systems were: [] reviewed  [x] unable to be reviewed as pt is unresponsive Positive ROS include: 
Constitutional: fatigue, weakness, in pain, short of breath Ears/nose/mouth/throat: increased airway secretions Respiratory:shortness of breath, wheezing Gastrointestinal:poor appetite, nausea, vomiting, abdominal pain, constipation, diarrhea Musculoskeletal:pain, deformities, swelling legs Neurologic:confusion, hallucinations, weakness Psychiatric:anxiety, feeling depressed, poor sleep Endocrine:  
 
Adult Non-Verbal Pain Assessment Score: 2 Face [x] 0   No particular expression or smile 
[] 1   Occasional grimace, tearing, frowning, wrinkled forehead 
[] 2   Frequent grimace, tearing, frowning, wrinkled forehead Activity (movement) [] 0   Lying quietly, normal position 
[x] 1   Seeking attention through movement or slow, cautious movement 
[] 2   Restless, excessive activity and/or withdrawal reflexes Guarding 
[] 0   Lying quietly, no positioning of hands over areas of body [x] 1   Splinting areas of the body, tense 
[] 2   Rigid, stiff Physiology (vital signs) [x] 0   Stable vital signs [] 1   Change in any of the following: SBP > 20mm Hg; HR > 20/minute 
[] 2   Change in any of the following: SBP > 30mm Hg; HR > 25/minute Respiratory [x] 0   Baseline RR/SpO2, compliant with ventilator 
[] 1   RR > 10 above baseline, or 5% drop SpO2, mild asynchrony with ventilator 
[] 2   RR > 20 above baseline, or 10% drop SpO2, asynchrony with ventilator FUNCTIONAL ASSESSMENT Palliative Performance Scale (PPS): 10 
 
 
 PSYCHOSOCIAL/SPIRITUAL ASSESSMENT Active Problems: 
  SDH (subdural hematoma) ([facility] (1/2/2020) Past Medical History:  
Diagnosis Date  Endocrine disease   
 hypothyroidism  Psychiatric disorder Down Syndrome Past Surgical History:  
Procedure Laterality Date  HX CHOLECYSTECTOMY Social History Tobacco Use  Smoking status: Never Smoker  Smokeless tobacco: Never Used Substance Use Topics  Alcohol use: No  
 
No family history on file. No Known Allergies Current Facility-Administered Medications Medication Dose Route Frequency  LORazepam (ATIVAN) injection 1 mg  1 mg IntraVENous Q4H  
 LORazepam (ATIVAN) injection 1 mg  1 mg IntraVENous Q15MIN PRN  
 ketorolac (TORADOL) injection 15 mg  15 mg IntraVENous Q8H PRN  
 glycopyrrolate (ROBINUL) injection 0.2 mg  0.2 mg IntraVENous Q4H PRN  
 bisacodyL (DULCOLAX) suppository 10 mg  10 mg Rectal Q72H PRN  
 morphine injection 2 mg  2 mg IntraVENous Q15MIN PRN  
 valproate (DEPACON) 375 mg in 0.9% sodium chloride 50 mL IVPB  375 mg IntraVENous Q6H  
 
 
 PHYSICAL EXAM  
 
Wt Readings from Last 3 Encounters:  
01/16/20 61.9 kg (136 lb 7.4 oz) 01/15/20 56.2 kg (123 lb 14.4 oz) 01/02/20 66 kg (145 lb 8.1 oz) Visit Vitals /49 (BP 1 Location: Right arm, BP Patient Position: At rest) Pulse 68 Temp 96.5 °F (35.8 °C) Resp 12 Wt 61.9 kg (136 lb 7.4 oz) SpO2 90% BMI 34.16 kg/m² Supplemental O2  [] Yes  [x] NO Last bowel movement: 1/12 Currently this patient has: 
[] Peripheral IV [x] PICC  [] PORT [] ICD [] Riojas Catheter [] NG Tube   [] PEG Tube   
[] Rectal Tube [] Drain 
[] Other: external female catheter; staples s/p surgery Constitutional: patient lying abed with eyes closed Head: left scalp post-op with staples removed Eyes: lids normal, no drainage ENMT: mouth closed so cannot examine, some dryness of lips Cardiovascular: HR 60s, no peripheral edema Respiratory: normal WOB, mild secretions Gastrointestinal: soft, nontender Skin: warm, dry Neurologic: not alert or responsive to exam, restlessness present throughout visit but improved from yesterday Psychiatric: unable to assess as pt is unresponsive Pertinent Lab and or Imaging Tests: 
Lab Results Component Value Date/Time Sodium 134 (L) 01/15/2020 05:40 AM  
 Potassium 4.2 01/15/2020 05:40 AM  
 Chloride 99 01/15/2020 05:40 AM  
 CO2 29 01/15/2020 05:40 AM  
 Anion gap 6 01/15/2020 05:40 AM  
 Glucose 119 (H) 01/15/2020 05:40 AM  
 BUN 18 01/15/2020 05:40 AM  
 Creatinine 0.73 01/15/2020 05:40 AM  
 BUN/Creatinine ratio 25 (H) 01/15/2020 05:40 AM  
 GFR est AA >60 01/15/2020 05:40 AM  
 GFR est non-AA >60 01/15/2020 05:40 AM  
 Calcium 7.9 (L) 01/15/2020 05:40 AM  
 
Lab Results Component Value Date/Time Protein, total 6.4 01/05/2020 12:21 AM  
 Albumin 2.5 (L) 01/05/2020 12:21 AM  
 
   
 
Total time: 35 mins Counseling / coordination time: 20 
> 50% counseling / coordination?: yes, including counseling family regarding my assessment, recommended medication changes for present sxs, recommendations regarding transfer to [facility]; coordinating care with bedside nursing Alex Mckeon MD 
[facility]

## 2020-01-16 NOTE — HOSPICE
[facility] LCSW note: This LCSW and Felixjairo Escalona MSW in to meet with pt, who is minimally responsive, and her JOSEP [employer]. JOSEP reports pt has had periods of agitation. LCSW and MSW provided emotional support to pt sister-in law and discussed pt history of falls and now terminal prognosis. LCSW provided active listening as sister in law talked about relational strain between pt and her  mother. LCSW provided support for sister in law as she was tearful and expressed her feelings of guilt for not recognizing the signs of pt decline due to terminal illness. LCSW reframed to regret and normalized EOL sx. LCSW and MSW provided support for sister in law to cope with multiple stressors in addition to pt EOL. Patient will be transferred to [facility] this evening. Transportation will be provided by [employer] at 7:00. PCS and DDNR on hard chart. Pt is DDNR; if pt stabilizing she will return home with the family with support from [place] personal care attendants. Danyel's FH to serve.

## 2020-01-16 NOTE — PROGRESS NOTES
Music Therapy Assessment Duane Jerome [address] Kendra Grullon [id number]    
1955  71 y.o.  female Patient Telephone Number: [phone] (home) Yazidi Affiliation: [employer]  
Language: [place] Patient Active Problem List  
 Diagnosis Date Noted  Seizures ([facility] 01/06/2020  
 SDH (subdural hematoma) ([facility] 01/02/2020  Down's syndrome 01/02/2020  Acquired hypothyroidism 01/02/2020 Date: 1/16/2020            Total Time (in minutes): 15          [facility] 2N MED SURG Mental Status:   [  ] Alert [  ] Wagner Hugo [  ]  Confused  [x] Minimally responsive  [  ] Sleeping Communication Status: [  ] Impaired Speech [  ] Nonverbal -N/A Physical Status:   [  ] Oxygen in use  [  ] Hard of Hearing [  ] Vision Impaired [  ] Ambulatory  [  ] Ambulatory with assistance [  ] Non-ambulatory -N/A Music Preferences, Background: Pt's family said pt likes Rock and Roll from the 1980's. Clinical Problem addressed: Support comfort and relaxation. Goal(s) met in session: 
Physical/Pain management (Scale of 1-10): Pre-session rating: Pt was unable to report on pain. Post-session rating: Pt was unable to report on pain. [x] Increased relaxation   [x] Affected breathing patterns [  ] Decreased muscle tension   [  ] Decreased agitation [  ] Affected heart rate    [  ] Increased alertness Emotional/Psychological: 
[  ] Increased self-expression   [  ] Decreased aggressive behavior [  ] Decreased feelings of stress  [  ] Discussed healthy coping skills [  ] Improved mood    [  ] Decreased withdrawn behavior Social: 
[  ] Decreased feelings of isolation/loneliness [  ] Positive social interaction [  ] Provided support and/or comfort for family/friends Spiritual: 
[  ] Spiritual support    [  ] Expressed peace [  ] Expressed radha    [  ] Discussed beliefs Techniques Utilized (Check all that apply):  
[  ] Procedural support MT [x] Music for relaxation [x] Patient preferred music [  ] [place]  [  ] Song choice  [  ] Music for validation [  ] Entrainment  [  ] Movement to music [  ] Guided visualization [  ] Darryl Oleary  [  ] Patient instrument playing [  ] Rom Senior writing [  ] Theresa Izquierdo along   [  ] Improvisation  [x] Sensory stimulation [  ] Active Listening  [  ] Music for spiritual support [  ] Making of CDs as gifts Session Observations:  Referral from [facility]IS Hospice RN and Kilo Dunn, Palliative . Patient (pt) was minimally responsive lying in bed with her eyes closed. This music therapist (MT) gently touched pt's shoulder to greet her, share that she could expect to hear live music, and that a music therapy intern would be observing. MT sat at bedside and softly sang and played the [employer] with guitar. Pt's breathing rhythm appeared to deepen and slow in response to the music. MT gently touched pt's shoulder to check in with her between songs. Pt's facial expression remained relaxed throughout the session. MT sang and played the [facility]). MT gently touched pt's arm while concluding the session. Will follow as able. KASSIE Mohamud (Music Therapist-Board Certified) 
and 
Jeffery James, Music Therapy Intern Spiritual Care Department Referral-based service

## 2020-01-16 NOTE — HOSPICE PLAN OF CARE
Patient admitted to inpatient hospice today, minimal responsiveness, too unstable for transport today. Restraints have been removed.  
  
  
Problem: Non-Violent Restraints Goal: *Removal from restraints as soon as assessed to be safe Outcome: Progressing Towards Goal 
Goal: *No harm/injury to patient while restraints in use Outcome: Progressing Towards Goal 
Goal: *Patient's dignity will be maintained Outcome: Progressing Towards Goal 
Goal: *Patient Specific Goal (EDIT GOAL, INSERT TEXT) Outcome: Progressing Towards Goal 
Goal: Non-violent Restaints:Standard Interventions Outcome: Progressing Towards Goal 
Goal: Non-violent Restraints:Patient Interventions Outcome: Progressing Towards Goal 
Goal: Patient/Family Education Outcome: Progressing Towards Goal 
  
Problem: Pressure Injury - Risk of 
Goal: *Prevention of pressure injury Description Document Yosef Scale and appropriate interventions in the flowsheet. Outcome: Progressing Towards Goal 
Note: Pressure Injury Interventions: 
Sensory Interventions: Assess changes in LOC, Keep linens dry and wrinkle-free, Minimize linen layers 
  
Moisture Interventions: Absorbent underpads, Apply protective barrier, creams and emollients 
  
Activity Interventions: Pressure redistribution bed/mattress(bed type) 
  
Mobility Interventions: Pressure redistribution bed/mattress (bed type), HOB 30 degrees or less 
  
Nutrition Interventions: (NPO) 
  
Friction and Shear Interventions: Apply protective barrier, creams and emollients, HOB 30 degrees or less, Lift sheet 
  
  
  
  
  
Problem: Patient Education: Go to Patient Education Activity Goal: Patient/Family Education Outcome: Progressing Towards Goal 
  
Problem: Falls - Risk of 
Goal: *Absence of Falls Description Document Adam Eisenberg Fall Risk and appropriate interventions in the flowsheet. Outcome: Progressing Towards Goal 
Note: Fall Risk Interventions: Mobility Interventions: Bed/chair exit alarm, Communicate number of staff needed for ambulation/transfer 
  
Mentation Interventions: Adequate sleep, hydration, pain control, Door open when patient unattended 
  
Medication Interventions: Evaluate medications/consider consulting pharmacy 
  
Elimination Interventions: Toileting schedule/hourly rounds 
  
History of Falls Interventions: Bed/chair exit alarm, Door open when patient unattended 
  
  
  
Problem: Patient Education: Go to Patient Education Activity Goal: Patient/Family Education Outcome: Progressing Towards Goal 
  
Problem: Hospice Orientation Goal: Demonstrate understanding of hospice philosophy, plan of care, and home hospice program 
Description The patient/family/caregiver will demonstrate understanding of hospice philosophy, plan of care and the home hospice program as evidenced by participation in meeting the patient's psychosocial, spiritual, medical, and physical needs inclusive of medical supplies/equipment focusing on symptoms. Outcome: Progressing Towards Goal 
  
Problem: Potential for Skin Breakdown Goal: Demonstrate ability to care for skin, monitor areas of breakdown and demonstrate methods to prevent breakdown Description Patient/family/caregiver will demonstrate ability to care for patient's skin, monitor for areas of breakdown, and demonstrate methods to prevent breakdown during hospice care. Outcome: Progressing Towards Goal 
  
Problem: Alteration in Mobility Goal: Remain as independent as possible and remain safe in environment Description Patient will remain as independent as possible and remain safe in their environment. Outcome: Progressing Towards Goal 
  
Problem: Comfort Deficit Goal: Reduce/control pain Description Patient will report that pain has been reduced or controlled through verbal and nonverbal means and that measures to promote comfort are effective.  
Outcome: Progressing Towards Goal 
  
 Problem: Pain Goal: Verbalize satisfaction of level of comfort and symptom control Outcome: Progressing Towards Goal 
  
Problem: Anticipatory Grief Goal: Explore reactions to and verbalize acceptance of impending loss Description Patient/family/caregiver will explore reactions to and verbalize acceptance of impending loss. Outcome: Progressing Towards Goal 
  
Problem: Anxiety/Agitation Goal: Verbalize and demonstrate ability to manage anxiety Description The patient/family/caregiver will verbalize and demonstrate ability to manage the patient's anxiety throughout hospice care. Outcome: Progressing Towards Goal 
  
Problem: End of Life Process Goal: Demonstrate understanding of end of life processes Description Patient/caregiver will understand end of life processes. Outcome: Progressing Towards Goal 
  
Problem: Breathing Pattern - Ineffective Goal: *Use of effective breathing techniques Outcome: Progressing Towards Goal 
  
Problem: Grieving Goal: *Able to express feelings of grief Outcome: Progressing Towards Goal 
Goal: *Able to identify stages of grieving process Outcome: Progressing Towards Goal 
  
Problem: Discharge Planning Goal: *Participates in discharge planning Outcome: Progressing Towards Goal 
  
Problem: Infection - Risk of, Central Venous Catheter-Associated Bloodstream Infection Goal: *Absence of infection signs and symptoms Outcome: Progressing Towards Goal 
  
Problem: Infection - Risk of, Urinary Catheter-Associated Urinary Tract Infection Goal: *Absence of infection signs and symptoms Outcome: Progressing Towards Goal 
  
Problem: Patient Education: Go to Patient Education Activity Goal: Patient/Family Education Outcome: Progressing Towards Goal

## 2020-01-16 NOTE — [facility] IDG SOCIAL WORKER NOTES
Patient: Jose Mora Date: 01/16/20 1/15/2020 Time: 8:34 AM 
 
[facility]  Notes This LCSW, [place] MSW, and Tricia Stevenson RN met with pts caretaker/brother Carl Castro, and his wife Sanjeev Daniels for GIP admission. Pt is minimally responsive. Pt is a 70 y/o CF with a hospice diagnosis of SDH. Pt has comorbid SDH s/p craniotomy, Downs syndrome, seizures, metabolic encephalopathy, dysphagia, hx of frequent falls, and anxiety. Pt was admitted 1/2/2020 from home due to AMS and dx of [facility]. LCSW will provide emotional support for Allcaryn Bagley and Sanjeev Jeremiah in coping with pts now terminal SDH. LCSW will provide supportive counseling for Socorrotao Milesjosey in coping with multiple stressors. LCSW will continue to provide resources for Allayne Pack, including [facility] resources. Low risk for BR due to acceptance and healthy coping. Signed by: Roberto Rendon

## 2020-01-16 NOTE — PROGRESS NOTES
Problem: Pressure Injury - Risk of 
Goal: *Prevention of pressure injury Description Document Yosef Scale and appropriate interventions in the flowsheet. Outcome: Progressing Towards Goal 
Note: Pressure Injury Interventions: 
Sensory Interventions: Float heels, Keep linens dry and wrinkle-free Moisture Interventions: Absorbent underpads, Check for incontinence Q2 hours and as needed, Internal/External urinary devices Activity Interventions: Pressure redistribution bed/mattress(bed type) Mobility Interventions: Assess need for specialty bed, HOB 30 degrees or less Nutrition Interventions: Document food/fluid/supplement intake Friction and Shear Interventions: Apply protective barrier, creams and emollients Problem: Falls - Risk of 
Goal: *Absence of Falls Description Document Blondcate Hover Fall Risk and appropriate interventions in the flowsheet. Outcome: Progressing Towards Goal 
Note: Fall Risk Interventions: 
Mobility Interventions: Communicate number of staff needed for ambulation/transfer Mentation Interventions: Adequate sleep, hydration, pain control, Bed/chair exit alarm, Door open when patient unattended Medication Interventions: Evaluate medications/consider consulting pharmacy Elimination Interventions: Bed/chair exit alarm, Toileting schedule/hourly rounds History of Falls Interventions: Bed/chair exit alarm, Door open when patient unattended, Room close to nurse's station

## 2020-01-16 NOTE — [facility] IDG CHAPLAIN NOTES
Patient: Arya Petit Date: 01/16/20 Time: 9:38 AM 
 
[facility]  Notes New patient who will be seen by 1/17/2020 to offer spiritual support and assess spiritual care needs. Signed by: Bladimir Miranda

## 2020-01-16 NOTE — PROGRESS NOTES
Patient admitted to inpatient hospice today, minimal responsiveness, too unstable for transport today. Restraints have been removed. Problem: Non-Violent Restraints Goal: *Removal from restraints as soon as assessed to be safe Outcome: Progressing Towards Goal 
Goal: *No harm/injury to patient while restraints in use Outcome: Progressing Towards Goal 
Goal: *Patient's dignity will be maintained Outcome: Progressing Towards Goal 
Goal: *Patient Specific Goal (EDIT GOAL, INSERT TEXT) Outcome: Progressing Towards Goal 
Goal: Non-violent Restaints:Standard Interventions Outcome: Progressing Towards Goal 
Goal: Non-violent Restraints:Patient Interventions Outcome: Progressing Towards Goal 
Goal: Patient/Family Education Outcome: Progressing Towards Goal 
  
Problem: Pressure Injury - Risk of 
Goal: *Prevention of pressure injury Description Document Yosef Scale and appropriate interventions in the flowsheet. Outcome: Progressing Towards Goal 
Note: Pressure Injury Interventions: 
Sensory Interventions: Assess changes in LOC, Keep linens dry and wrinkle-free, Minimize linen layers Moisture Interventions: Absorbent underpads, Apply protective barrier, creams and emollients Activity Interventions: Pressure redistribution bed/mattress(bed type) Mobility Interventions: Pressure redistribution bed/mattress (bed type), HOB 30 degrees or less Nutrition Interventions: (NPO) Friction and Shear Interventions: Apply protective barrier, creams and emollients, HOB 30 degrees or less, Lift sheet Problem: Patient Education: Go to Patient Education Activity Goal: Patient/Family Education Outcome: Progressing Towards Goal 
  
Problem: Falls - Risk of 
Goal: *Absence of Falls Description Document Parvez Kim Fall Risk and appropriate interventions in the flowsheet. Outcome: Progressing Towards Goal 
Note: Fall Risk Interventions: Mobility Interventions: Bed/chair exit alarm, Communicate number of staff needed for ambulation/transfer Mentation Interventions: Adequate sleep, hydration, pain control, Door open when patient unattended Medication Interventions: Evaluate medications/consider consulting pharmacy Elimination Interventions: Toileting schedule/hourly rounds History of Falls Interventions: Bed/chair exit alarm, Door open when patient unattended Problem: Patient Education: Go to Patient Education Activity Goal: Patient/Family Education Outcome: Progressing Towards Goal 
  
Problem: Hospice Orientation Goal: Demonstrate understanding of hospice philosophy, plan of care, and home hospice program 
Description The patient/family/caregiver will demonstrate understanding of hospice philosophy, plan of care and the home hospice program as evidenced by participation in meeting the patient's psychosocial, spiritual, medical, and physical needs inclusive of medical supplies/equipment focusing on symptoms. Outcome: Progressing Towards Goal 
  
Problem: Potential for Skin Breakdown Goal: Demonstrate ability to care for skin, monitor areas of breakdown and demonstrate methods to prevent breakdown Description Patient/family/caregiver will demonstrate ability to care for patient's skin, monitor for areas of breakdown, and demonstrate methods to prevent breakdown during hospice care. Outcome: Progressing Towards Goal 
  
Problem: Alteration in Mobility Goal: Remain as independent as possible and remain safe in environment Description Patient will remain as independent as possible and remain safe in their environment. Outcome: Progressing Towards Goal 
  
Problem: Comfort Deficit Goal: Reduce/control pain Description Patient will report that pain has been reduced or controlled through verbal and nonverbal means and that measures to promote comfort are effective. Outcome: Progressing Towards Goal 
  
Problem: Pain Goal: Verbalize satisfaction of level of comfort and symptom control Outcome: Progressing Towards Goal 
  
Problem: Anticipatory Grief Goal: Explore reactions to and verbalize acceptance of impending loss Description Patient/family/caregiver will explore reactions to and verbalize acceptance of impending loss. Outcome: Progressing Towards Goal 
  
Problem: Anxiety/Agitation Goal: Verbalize and demonstrate ability to manage anxiety Description The patient/family/caregiver will verbalize and demonstrate ability to manage the patient's anxiety throughout hospice care. Outcome: Progressing Towards Goal 
  
Problem: End of Life Process Goal: Demonstrate understanding of end of life processes Description Patient/caregiver will understand end of life processes. Outcome: Progressing Towards Goal 
  
Problem: Breathing Pattern - Ineffective Goal: *Use of effective breathing techniques Outcome: Progressing Towards Goal 
  
Problem: Grieving Goal: *Able to express feelings of grief Outcome: Progressing Towards Goal 
Goal: *Able to identify stages of grieving process Outcome: Progressing Towards Goal 
  
Problem: Discharge Planning Goal: *Participates in discharge planning Outcome: Progressing Towards Goal 
  
Problem: Infection - Risk of, Central Venous Catheter-Associated Bloodstream Infection Goal: *Absence of infection signs and symptoms Outcome: Progressing Towards Goal 
  
Problem: Infection - Risk of, Urinary Catheter-Associated Urinary Tract Infection Goal: *Absence of infection signs and symptoms Outcome: Progressing Towards Goal 
  
Problem: Patient Education: Go to Patient Education Activity Goal: Patient/Family Education Outcome: Progressing Towards Goal

## 2020-01-17 NOTE — PROGRESS NOTES
1925 Arrived at the [facility] via medical transport via stretcher. Pt is unresponsive to verbal name call. She does not open her eyes but moving her head back and forth and moves her arms. Color is pale, nail beds are dusky. Incision with scab approx 3 inches left occipital from previous surgery. Has moderate amt clear secretions drooling from mouth. No seizure activity noted. Unable to get 02 sat reading, respiratory rate 14, BP 62/40. Riojas catheter with clear yellow urine. Right upper arm PICC triple lumen. Pt repositioned tpo her left side for comfor. 2000 Brother Ilia Thomas and his wife JOESPH YANES [facility] have arrived. She is very emotional as she talks about and to the pt.  2015 Pt is moving arms and head. They state she required restraints at the hospital at one time to prevent her from getting up and pulling out tubes. She appears weak and unable to move enough to get up at this time. Bed alarm set and 3 side rails put up. Scheduled Lorazepam given IV for symptom management. 2045 Pt is restless, moving upper body and appears to grimace. Questioned family as to her symptoms when she has pain. Educated them in non verbal symptoms. Morphine 2 mg given IV for symptoms of pain. Robinul Given IV for increased secretions. 2100 Pt appears to be resting quietly. Medication effective to manage pain. 2200 Family remains at the bedside. Pt is resting quietly. Respirations are shallow. Pts face is flushed. Nail beds are now pink. 18 Family are leaving. Sister in law is tearful about leaving her, but she states she and her  have MD appointments in [place] in the am. They are yet undecided if they will go. 0015 Scheduled Lorazepam given for symptom management. No seizure activity noted. Face flushed, skin is warm. Ax Temp 97.4 , 02 sats 90, HR 62.  0100 Continue to monitor for s/s of discomfort or seizures. Resting quietly. 0200 Resting quietly with eyes closed. Respirations shallow. 0300 No seizure activity noted. Resting quietly, Respirations 10 and shallow. 0400 Scheduled IV Medication given to manage seizures and anxiety. Pt given bath and hair washed. Repositioned to her left side. No response when bath given. Did not open eyes. 0450 Resting quietly. Respirations shallow. 0600 Pt is resting quietly, no seizure activity noted. No signs of pain. 8335 Continue to monitor. Scheduled IV Lorazepam required to manage seizures. NAME OF PATIENT:  Joy Miller    LEVEL OF CARE:  GIP    REASON FOR GIP:   Pain, despite numerous changes in medications, Medication adjustment that must be monitored 24/7 and Stabilizing treatment that cannot take place at home    *PATIENT REMAINS ELIGIBLE FOR GIP LEVEL OF CARE AS EVIDENCED BY: frequent assessment of the pt and administration of scheduled and prn IV medications required to manage symptoms. REASON FOR RESPITE:  na    O2 SAFETY:  na    FALL INTERVENTIONS PROVIDED:   Implemented/recommended use of fall risk identification flag to all team members, Implemented/recommended resources for alarm system (personal alarm, bed alarm, call bell, etc.) , Implemented/recommended environmental changes (remove hazards, lower bed, improve lighting, etc.) and Implemented/recommended increased supervision/assistance    INTERDISPLINARY COMMUNICATION/COLLABORATION:  Physician, MSW, [place] and RN, CNA    NEW MEDICATION INITIATION DOCUMENTATION:  Documentation completed in Clinical Note in [address] [facility]  Admission orders obtained    Reason medication is being initiated:  Admission orders    MD / Provider name consulted re: change in status / initiation of new medication:  Dr Marjorie Arnold Symptom(s):     For management of pain, anxiety and seizures      New Order(s):  Admission orders    Name of the person notified of the changes:  Dany Zhu, brother    Name of person being taught:  Dany Zhu    Instructions given: purpose and action of medications    Side Effects taught:  Sedation    Response to teaching:  Voices acceptance and understanding      COMFORTABLE DYING MEASURE:  Is Patient/family satisfied with symptom level? Yes    DISCHARGE PLAN:  Remain GIP until symptoms are managed.

## 2020-01-17 NOTE — HOSPICE
LCSW met with patient at bedside along with medical team during rounds. Patient lying in bed and unresponsive, appears comfortable and breathing in shallow manner. No family at bedside during this time however they will be coming later this evening after MD appt in [place]. Nursing team to update family as needed with changes.     ASHLEY Ferguson, [facility]   [phone]

## 2020-01-17 NOTE — PROGRESS NOTES
..  0700:Report received from [facility] and [facility] Pt is unresponsive to verbal and tactile stimuli, resp even and non labored hypoactive bowel sounds, no symptoms of pain. 08:15:Pt slightly repositioned, schedule stivan adm by Mae Eldridge RN.  0900:Resting quietly , no distress noted. 10:15:No changes resting comfortable, brother called updated by Kapil Jimenez.  11:30:Pt repositioned for comfort. 12:05:Schedule ativan adm by Mae Eldridge RN. 12:40:Mouth care done. 13:30:Resting quietly. 14:30:Shallow resp noted at 12 elevated HOB. 15:30:Mouth care done, no discomfort noted. 16:30:Pt Turned and repositioned for comfort mouth care done. 17:30:Family arrived, pt move extremities. 18:19:Adm Robinul 0.2 mg for increase secretions.   1900:Report given to Soto Sparks RN      NAME OF PATIENT: Devaughn Wisdom      LEVEL OF CARE: GIP  REASON FOR GIP: Anxiety, Seizures and Increase secretions.       *PATIENT REMAINS ELIGIBLE FOR GIP LEVEL OF CARE AS EVIDENCED BY:     REASON FOR RESPITE:N/A       O2 SAFETY:  Concentrator positioning (6\" from furniture/drapes), Tanks stored in merchant , No petroleum based products on face while oxygen in use and Oxygen sign on the door     FALL INTERVENTIONS PROVIDED:   Implemented/recommended use of fall risk identification flag to all team members, Implemented/recommended assistive devices and encouraged their use, Implemented/recommended resources for alarm system (personal alarm, bed alarm, call bell, etc.)  and Implemented/recommended environmental changes (remove hazards, lower bed, improve lighting, etc.)     INTERDISPLINARY COMMUNICATION/COLLABORATION:  Physician, MSW, [place] and RN, CNA     NEW MEDICATION INITIATION DOCUMENTATION:N/A      Reason medication is being initiated:  N/A     MD / Provider name consulted re: change in status / initiation of new medication:  N/A     New Symptom(s):  N/A     New Order(s):  N/A     Name of the person notified of the changes:  N/A     Name of person being taught:  N/A     Instructions given:  N/A     Side Effects taught:  N/A     Response to teaching:  N/A        COMFORTABLE DYING MEASURE:  Is Patient/family satisfied with symptom level?  yes     DISCHARGE PLAN:Pt will remain at [facility] until she passes.

## 2020-01-17 NOTE — PROGRESS NOTES
0800-patient resting quietly in bed; eyes closed; pt did not respond to voice or touch; no dyspnea or grimacing; scheduled lorazepam administered  1207-scheduled lorazepam administered; patient unresponsive to voice or tactile stimuli; pt laying quietly, no facial grimacing, furrowed brow; respirations shallow, regular and non-labored  1549-patient resting in bed quietly; no grimacing, frowning, restlessness or dyspnea noted; respirations shallow, slow and unlabored; scheduled lorazepam administered via PICC line  1820-PRN robinol administered for upper airway secretions; pt continues to rest quietly; family at bedside

## 2020-01-17 NOTE — PROGRESS NOTES
Problem: Falls - Risk of  Goal: *Absence of Falls  Description  Document Johnnie Dorado Fall Risk and appropriate interventions in the flowsheet. Outcome: Not Progressing Towards Goal  Note: Fall Risk Interventions:  Mobility Interventions: Bed/chair exit alarm    Mentation Interventions: Adequate sleep, hydration, pain control, Bed/chair exit alarm, Door open when patient unattended, Evaluate medications/consider consulting pharmacy    Medication Interventions: Evaluate medications/consider consulting pharmacy    Elimination Interventions: Bed/chair exit alarm    History of Falls Interventions: Bed/chair exit alarm, Door open when patient unattended, Evaluate medications/consider consulting pharmacy         Problem: Pressure Injury - Risk of  Goal: *Prevention of pressure injury  Description  Document Yosef Scale and appropriate interventions in the flowsheet.   Outcome: Not Progressing Towards Goal  Note: Pressure Injury Interventions:  Sensory Interventions: Assess changes in LOC, Float heels, Keep linens dry and wrinkle-free    Moisture Interventions: Absorbent underpads    Activity Interventions: Pressure redistribution bed/mattress(bed type)    Mobility Interventions: HOB 30 degrees or less, Pressure redistribution bed/mattress (bed type)         Friction and Shear Interventions: Apply protective barrier, creams and emollients, HOB 30 degrees or less, Lift sheet, Minimize layers                Problem: Patient Education: Go to Patient Education Activity  Goal: Patient/Family Education  Outcome: Not Progressing Towards Goal     Problem: Emotional Support Needs  Goal: Patient/family is receiving emotional support  Description  Patient/family will receive emotional support during [facility] stay   Outcome: Progressing Towards Goal

## 2020-01-17 NOTE — PROGRESS NOTES
[facility]  [phone]    Patient Name: Brandon Quiñones  YOB: 1955    Date of Provider Hospice Visit: 01/17/20    Level of Care:   [x] General Inpatient (GIP)    [] Routine   [] Respite    Current Location of Care:  [] [facility] [] [facility] [] [facility] [] [facility] [x] Hospice House THE [facility], patient referred from:  [x] [facility] [] [facility] [] [facility] [] [facility] [] Home [] Other:     Date of Original Hospice Admission: 1/15/20  Hospice Medical Director at time of admission: Nancy Evans MD    Principle Hospice Diagnosis: Subdural hematoma  Diagnoses RELATED to the terminal prognosis:   Metabolic encephalopathy  Seizures  Other Diagnoses:   Down Syndrome  Hypothyroidism     HOSPICE SUMMARY     Brandon Quiñones is a 71y.o. year old who was admitted to [address] [place] [address]. She has down syndrome, chronic SDH, frequent falls, anxiety, hypothyroidism and was admitted to [facility] on 1/2/2020 from home due to [address] [place] [address] with a diagnosis of SDH now s/p craniotomy for left frontal SDH. Her post-operative course has been complicated by fluctuating mentation, seizures, agitation/restlessness, and dysphagia/aspiration. She was receiving supplemental nutrition with dobhoff tube until she pulled it out. The family met with Palliative to discuss [address]. Risks and beneftis of g-tube were discussed, and family believed she would be agitated by this and would likely pull it as she did the dobhoff. The family decided to pursue comfort-focused care with hospice. At time of my exam pt is unresponsive with PPS 10%, significant restlessness, intermittent apnea, and has an estimated prognosis of hours to days. The patient/family chose comfort measures with the support of Hospice. Objective information:   CT Head 1/8/20:  FINDINGS:  There is persistent shift of the midline to the right by 2.5 mm, previously 3.1  mm. There is mild hydrocephalus which is stable. There is no significant white  matter disease.  The fluid collection adjacent to the right frontal lobe measures  10.8 mm and previously measured 11.7 mm. The mixed subdural hematoma adjacent to  left frontal lobe measures 13.1 mm and previously measured 12.9 mm. The mixed  subdural hematoma adjacent to the left parietal lobe measures 12.2 mm and  previously measured 12.0 mm. Hematoma adjacent to left frontal lobe measures 9.5  mm and previously measured 9.3 mm. The basilar cisterns are open. No acute  infarct is identified. The bone windows demonstrate craniectomy. The visualized  portions of the paranasal sinuses and mastoid air cells are clear. IMPRESSION: Interval slight improvement in midline shift. HOSPICE ASSESSMENT     Active Symptoms and Issues:  -Anxiety/agitation/restlessness  -Seizures  -Apnea: intermittent  -Generalized pain:non verbal  -Secretions  -Decreased responsiveness/unresponsiveness     PLAN     1. Continue GIP level of care as pt still requiring close monitoring and symptoms management. 2. Continue current comfort medications including Morphine 2 mg IV every 15mins as needed for pain & air hunger. 3. Continue scheduled ativan 1 mg IV every 4 hours and every 15 mins as needed to help with seizures and anxiety/agitation/restlessness/respiratory distress. 4. Continue robinul as needed and scopolamine patch to help with airway secretions  5. Prn bisacodyl for constipation  6. Prn toradol and/or tylenol for fever  7.  and SW to support family needs  8. Disposition:home with hospice if stabilizes, unlikely as  She is nearing end  9. Hospice Plan of care was reviewed in detail and agree with current plan of care    Prognosis estimated based on 01/17/20 clinical assessment is:   [x] Hours to Days    [] Days to Weeks    [] Other:    Communicated plan of care with: Hospice Case Manager;  Hospice IDT; Care Team     GOALS OF CARE     Patient/Medical POA stated Goal of Care: optimize patient comfort    [x] I have reviewed and/or updated ACP information in the Advance Care Planning Navigator. This information is available in the [address] link in the patient's chart header. Primary Medical Decision Maker:   Primary Decision Maker (Active): Samuel Castro -  - [phone]    Resuscitation Status: DNR  If DNR is there a Durable DNR on file? : [x] Yes [] No (If no, complete Durable DNR)    HISTORY     History obtained from: chart review, hospice RN,  hospice SW    CHIEF COMPLAINT: patient is unresponsive  The patient is:   [] Verbal  [] Nonverbal  [x] Unresponsive    HPI/SUBJECTIVE:    Pt appears lethargic and unresponsive, appears comfortable  Pt got all scheduled lorazepam, 1 prn morphine, 1 prn robinul     REVIEW OF SYSTEMS     The following systems were: [] reviewed  [x] unable to be reviewed as pt is unresponsive    Adult Non-Verbal Pain Assessment Score: 4    Face  [x] 0   No particular expression or smile  [] 1   Occasional grimace, tearing, frowning, wrinkled forehead  [] 2   Frequent grimace, tearing, frowning, wrinkled forehead    Activity (movement)  [] 0   Lying quietly, normal position  [x] 1   Seeking attention through movement or slow, cautious movement  [] 2   Restless, excessive activity and/or withdrawal reflexes    Guarding  [x] 0   Lying quietly, no positioning of hands over areas of body  [] 1   Splinting areas of the body, tense  [] 2   Rigid, stiff    Physiology (vital signs)  [] 0   Stable vital signs  [] 1   Change in any of the following: SBP > 20mm Hg; HR > 20/minute  [x] 2   Change in any of the following: SBP > 30mm Hg; HR > 25/minute    Respiratory  [] 0   Baseline RR/SpO2, compliant with ventilator  [x] 1   RR > 10 above baseline, or 5% drop SpO2, mild asynchrony with ventilator  [] 2   RR > 20 above baseline, or 10% drop SpO2, asynchrony with ventilator     FUNCTIONAL ASSESSMENT     Palliative Performance Scale (PPS): 10       PSYCHOSOCIAL/SPIRITUAL ASSESSMENT     Active Problems:    * No active hospital problems.  *    Past Medical History:   Diagnosis Date    Endocrine disease     hypothyroidism    Psychiatric disorder     Down Syndrome      Past Surgical History:   Procedure Laterality Date    HX CHOLECYSTECTOMY        Social History     Tobacco Use    Smoking status: Never Smoker    Smokeless tobacco: Never Used   Substance Use Topics    Alcohol use: No     No family history on file.    No Known Allergies   Current Facility-Administered Medications   Medication Dose Route Frequency    bisacodyL (DULCOLAX) suppository 10 mg  10 mg Rectal DAILY PRN    LORazepam (ATIVAN) injection 1 mg  1 mg IntraVENous Q4H    LORazepam (ATIVAN) injection 2 mg  2 mg IntraVENous Q5MIN PRN    morphine injection 2 mg  2 mg IntraVENous Q15MIN PRN    ketorolac (TORADOL) injection 15 mg  15 mg IntraVENous Q6H PRN    glycopyrrolate (ROBINUL) injection 0.2 mg  0.2 mg IntraVENous Q6H PRN    scopolamine (TRANSDERM-SCOP) 1 mg over 3 days 1 Patch  1 Patch TransDERmal Q72H PRN    acetaminophen (TYLENOL) suppository 650 mg  650 mg Rectal Q4H PRN        PHYSICAL EXAM     Wt Readings from Last 3 Encounters:   01/16/20 61.9 kg (136 lb 7.4 oz)   01/15/20 56.2 kg (123 lb 14.4 oz)   01/02/20 66 kg (145 lb 8.1 oz)       Visit Vitals  BP (!) 68/38 (BP 1 Location: Left arm, BP Patient Position: At rest)   Pulse 68   Temp 96.7 °F (35.9 °C)   Resp 14   SpO2 (!) 14%       Supplemental O2  [] Yes  [x] NO  Last bowel movement: 1/12    Currently this patient has:  [] Peripheral IV [x] PICC  [] PORT [] ICD    [] Riojas Catheter [] NG Tube   [] PEG Tube    [] Rectal Tube [] Drain  [] Other: external female catheter; staples s/p surgery     Constitutional: patient is lethargic, unresponsive  Head: left scalp post-op with staples removed  Eyes: closed  ENMT: dry mucous membranes  Cardiovascular: distant heart sounds  Respiratory: decreased breath sounds, some upper chest secretions  Gastrointestinal: soft, nontender  Skin: warm, dry  Neurologic:unresponsive  Psychiatric: unable to assess as pt is unresponsive    Pertinent Lab and or Imaging Tests:  Lab Results   Component Value Date/Time    Sodium 134 (L) 01/15/2020 05:40 AM    Potassium 4.2 01/15/2020 05:40 AM    Chloride 99 01/15/2020 05:40 AM    CO2 29 01/15/2020 05:40 AM    Anion gap 6 01/15/2020 05:40 AM    Glucose 119 (H) 01/15/2020 05:40 AM    BUN 18 01/15/2020 05:40 AM    Creatinine 0.73 01/15/2020 05:40 AM    BUN/Creatinine ratio 25 (H) 01/15/2020 05:40 AM    GFR est AA >60 01/15/2020 05:40 AM    GFR est non-AA >60 01/15/2020 05:40 AM    Calcium 7.9 (L) 01/15/2020 05:40 AM     Lab Results   Component Value Date/Time    Protein, total 6.4 01/05/2020 12:21 AM    Albumin 2.5 (L) 01/05/2020 12:21 AM           Total time: 35 mins  Counseling / coordination time: 20  > 50% counseling / coordination?: yes    Paulino Albright MD  [facility]

## 2020-01-18 NOTE — PROGRESS NOTES
NAME OF PATIENT:  True Riley    LEVEL OF CARE:  GIP    REASON FOR GIP:   Medication adjustment that must be monitored 24/7 and Stabilizing treatment that cannot take place at home    *PATIENT REMAINS ELIGIBLE FOR GIP LEVEL OF CARE AS EVIDENCED BY: Need for scheduled and PRN IV medications for seizures, pain, fever, and secretions     REASON FOR RESPITE:  N/A    O2 SAFETY:  N/A    FALL INTERVENTIONS PROVIDED:   Implemented/recommended use of non-skid footwear, Implemented/recommended use of fall risk identification flag to all team members, Implemented/recommended environmental changes (remove hazards, lower bed, improve lighting, etc.) and Implemented/recommended increased supervision/assistance    INTERDISPLINARY COMMUNICATION/COLLABORATION:  Physician, MSW, [place] and RN, CNA    NEW MEDICATION INITIATION DOCUMENTATION:  N/A    Reason medication is being initiated: N/A    MD / Provider name consulted re: change in status / initiation of new medication:  N/A    New Symptom(s):  N/A    New Order(s):  N/A    Name of the person notified of the changes:  N/A    Name of person being taught:  N/A    Instructions given:  N/A    Side Effects taught:  N/A    Response to teaching:  N/A      COMFORTABLE DYING MEASURE:  Is Patient/family satisfied with symptom level? Yes    DISCHARGE PLAN:  Pending     0700 Report received from [facility].  6661 Patient given scheduled ativan, see MAR. Applied PRN scopolamine patch for noted secretions, will continue to monitor. 2155 Patient turned and repositioned in bed with help of IRLANDA Torres. Patient began having labored breathing after turn, gave PRN morphine. Will continue to monitor. 6645 Patient no longer has labored breathing at this time, will continue to monitor. 0845 Patient resting in bed quietly, respirations, shallow, even, and unlabored. Eyes are closed, neutral facial expression noted. 0930 Oral care performed on patient, patient tolerated activity well.  Lip balm applied to lips. Patient resting in bed quietly, respirations are even and unlabored, neutral facial expression noted. 3135 Patient given PRN robinul for secretions, will continue to monitor. 1005 Patient repositioned in bed for comfort, patient began having labored breathing after turn, gave PRN morphine, will continue to monitor. 1045 Patient resting in bed quietly, respirations are even and unlabored, neutral facial expression noted. 1107 Patient given scheduled lorazepam, see MAR.   1112 Patient noted to be using accessory muscles to breathe, gave PRN morphine, will continue to monitor. 1120 Patient's respirations seem less labored at this time. Will continue to monitor. 1212 Patient noted to have low grade fever of 99.0 axillary. Gave PRN Toradol, will continue to monitor. 1230 Patient suctioned via mouth. Secretions cleared, respirations are more even and unlabored. Patient's brother called for an update on patient. 1305 Patient resting in bed quietly, respirations are even and unlabored, neutral facial expression noted. 1315 Patient noted to be using accessory muscles to breathe, gave PRN morphine, see MAR. Patient given PRN robinul for secretions, will continue to monitor. 1400 Patient no longer using accessory muscles to breathe. Patient noted to have secretions, patient suctioned, secretions cleared. Patient tolerated activity well. 1425 Patient resting in bed quietly, respirations are even and unlabored, neutral facial expression noted. 1455 Patient having secretions, secretions suctioned, secretions cleared. Will continue to mentor. 1527 Patient given scheduled lorazepam and and PRN morphine for use of accessory muscles. Will continue to monitor. 1605 Patient turned and repositioned in bed with help of IRLANDA [employer]. [address] Patient resting in bed quietly, respirations are even and unlabored, neutral facial expression noted.    1725 Patient given PRN robinul for secretions, will continue to monitor. 1804 Patient noted to have low grade fever of 99.1, gave PRN Toradol, will continue to monitor.

## 2020-01-18 NOTE — PROGRESS NOTES
NAME OF PATIENT: Tip Warner  ? Verbal shift change report given to Gt Zhang (oncoming nurse) by Arias Bautista (offgoing nurse). Report included the following information SBAR and Kardex. LEVEL OF CARE: [facility] beginning on admission 1/16/2020.   ?   REASON FOR GIP:   Criteria for admission to [facility] - uncontrolled seizures, restlessness/ agitation, and pain. The \"precipitating event\": The patient was admitted to [facility] for subdural hematoma, where she had surgery and spent some time in the ICU. The patient's brother, her caregiver, chose to admit her to hospice when other treatment options had been exhausted. IDG decision to transfer to an appropriate setting: [facility] for management of symptoms and hospice care. Pain, despite numerous changes in medications, Terminal agitation, despite changes to medications, Medication adjustment that must be monitored 24/7 and Stabilizing treatment that cannot take place at home. The patient requires close monitoring, and frequent nursing assessment and reassessment of symptoms and response to the medication and treatments given. Stabilizing treatment, care, and symptom management cannot be managed in the home setting because the patient requires IV medications for symptom management. Patient continues to require close monitoring and treatment of symptoms (pain, restlessness, agitation, and secretions), with medication adjustments as necessary. Medication adjustments and additional medications remain necessary in order to stabilize the patient's symptoms. ?   *PATIENT REMAINS ELIGIBLE FOR [facility] LEVEL OF CARE AS EVIDENCED BY: (MUST BE ADDRESSED OF PATIENT GIP) his ongoing symptoms and treatments for those symptoms. Close monitoring and frequent medication administration; adjustments and use of IV meds. ?   O2 SAFETY:   The patient is on room air. Breathing is unlabored, shallow and slow. Occasional congested cough. 2010 - 96%. 2200 - 88%.   2245 - 88%.  2330 - 76%. 0000 - 93%. 0400 - 96%. ?   FALL INTERVENTIONS PROVIDED:   Implemented/recommended use of fall risk identification flag to all team members, Implemented/recommended assistive devices and encouraged their use, Implemented/recommended resources for alarm system (personal alarm, bed alarm, call bell, etc.) , Implemented/recommended environmental changes (remove hazards, lower bed, improve lighting, etc.) and Implemented/recommended increased supervision/assistance   Fall precautions maintained. Patient has a fall history. ?   INTERDISCIPLINARY COMMUNICATION/COLLABORATION:   Physician, MSW, [place] and RN, CNA   ? NEW MEDICATION INITIATION DOCUMENTATION:   No new medication at this time. ? ? New Symptom(s) actively being addressed and interventions to stabilize: No new symptoms at this time. Ongoing symptoms and interventions to stabilize include:   1. Increasing upper airway secretions, congested cough. Managed with prn Robinul IV, given x 1 tonight. 2. Seizures. Managed with scheduled Ativan given x 3 tonight. No seizure activity observed tonight. 3. Restlessness/ agitation. Managed with scheduled Ativan IV given x 3 tonight. 4. Generalized pain/ discomfort; non-verbal indicators since patient is unresponsive. Managed with prn Morphine IV, given x 1 tonight. Patient's response to interventions:  ?   New Order(s): No new orders at this time. ?   COMFORTABLE DYING MEASURE:   Is Patient/family satisfied with symptom level? Yes, signed DNR is on the patient's chart. ?   DISCHARGE PLAN:  The patient could possibly return home to be cared for by family when symptoms have stabilized and can be managed by the caregiver in the home. Possible end of life care here at [facility] because patient's condition continues to decline. Symptomatic imminent death that cannot be managed at home. [facility] home hospice care if and when discharged from [facility].      Night shift summary:   1900 - Shift report received. 1917 - PRN Morphine 2 mg IV given for non-verbal indicators of pain (grimacing). 1 - Patient's brother Baljinder Horowitz and sister-in-law Chas Brito are at bedside. 2010 - Patient is resting quietly. No audible bowel sounds. Supine. Palacios is patent and draining clear moshe urine. 2033 - Scheduled Ativan 1 mg IV given. No redness, swelling, or leaking at right upper arm triple lumen PICC line. 2100 - Occasional congested cough. Breathing is shallow. Patient is unresponsive, not opening her eyes. 2200 - Turned and repositioned to right side. No skin breakdown noticed. 2245 - No non-verbal indicators of pain or discomfort. 2330 - Breathing is even and unlabored. 2350 - Scheduled Ativan 1 mg IV given. 0000 - Breathing is shallow, regular. Baljinder Horowitz and Chas Brito remain at bedside. No seizure activity at this time. Repositioned in bed.   0100 - Remains unresponsive. No grimacing or moaning. Symptoms are managed at this time. 0200 - Eyes are closed and breathing is even and unlabored. 0310 - No changes. Turned and repositioned to left side. 2617 -  Scheduled Ativan 1 mg IV given. PRN Robinul 0.2 mg IV given for increasing upper airway secretions. 0500 - Total urine output from the palacios catheter = 250 ml.   0600 - Turned and repositioned; supine. Increasing upper airway secretions. 0700 - Shift report given.

## 2020-01-19 NOTE — PROGRESS NOTES
NAME OF PATIENT:  Boaz Vasquez    LEVEL OF CARE:  GIP    REASON FOR GIP:   Pain, despite numerous changes in medications, Terminal agitation, despite changes to medications, Medication adjustment that must be monitored 24/7 and Stabilizing treatment that cannot take place at home    *PATIENT REMAINS ELIGIBLE FOR [facility] LEVEL OF CARE AS EVIDENCED BY: (MUST BE ADDRESSED OF PATIENT GIP)      REASON FOR RESPITE:  NA    O2 SAFETY:  NA    FALL INTERVENTIONS PROVIDED:   Implemented/recommended use of non-skid footwear, Implemented/recommended use of fall risk identification flag to all team members, Implemented/recommended assistive devices and encouraged their use, Implemented/recommended resources for alarm system (personal alarm, bed alarm, call bell, etc.) , Implemented/recommended environmental changes (remove hazards, lower bed, improve lighting, etc.) and Implemented/recommended increased supervision/assistance    INTERDISPLINARY COMMUNICATION/COLLABORATION:  Physician, MSW, [place] and RN, CNA    NEW MEDICATION INITIATION DOCUMENTATION:  NA    Reason medication is being initiated:  BOONE    MD / Provider name consulted re: change in status / initiation of new medication:  NA    New Symptom(s):  NA    New Order(s):  NA    Name of the person notified of the changes:  NA    Name of person being taught:  NA    Instructions given:  NA    Side Effects taught:  NA    Response to teaching:  NA      COMFORTABLE DYING MEASURE:  Is Patient/family satisfied with symptom level?  yes    DISCHARGE PLAN:  End of life    19:00 Shift report received from [facility]  19:30 Patient lying in bed unresponsive to tactile and verbal stimulus, lung sounds coarse, respirations shallow with long periods of apnea, mottling noted on extremities, skin warm to touch. Family at bedside, emotional support provided. 20:00 Patient medicated with schedules Lorazepam and prn Morphine for generalized comfort. Will cont to monitor.   21:00 Patient resting quietly, no facial grimacing or moaning noted, respirations shallow with long periods of apnea. Family remains at bedside. 22:00 Patient turned and repositioned for comfort, mouth care provided, no facial grimacing or distress noted. Family remains at bedside. 00:15 Patient resting, respirations shallow with long periods of apnea, no signs of pain or seizure activity noted. 01:15 Patient remains unresponsive to verbal or tactile stimulus,no facial grimacing or distress noted. Family at bedside. 02:30 Patient remains unresponsive to verbal and tactile stimulus, no signs of pain or discomfort noted. 03:30 Patient resting quietly, no signs of pain or discomfort noted, respirations shallow with periods of apnea. 04:30 Patient repositioned for comfort, mouth care provided, no signs of pain or discomfort noted. 05:30 Patient unresponsive, no heart rate, no pulse, Pronounced by Rodolfo Concepcion RN, Family at bedside grieving appropriately. MD notified via e-mail.

## 2020-01-20 NOTE — HOSPICE
LCSW placed bereavement call to brother, Deana Rawls. No answer, left VM offering condolences.   Cam Dorado MSW, [facility]   [phone]

## 2020-01-21 NOTE — DISCHARGE SUMMARY
Discharge Summary [address] [place] [address] [facility] 
[phone] Date of Admission: 1/15/2020 Date of Discharge: 1/16/2020 Baljit Saldivar is a 71y.o. year old who was admitted to [address] [place] [address] at [facility] prior to later transfer to [facility] with a Hospice diagnosis of SDH (subdural hematoma) ([facility] [address] [S06.5X9A]. Pt was admitted for GIP level care. Per HPI: 
Tonya Martinez is a 71y.o. year old who was admitted to [address] [place] [address]. She has down syndrome, chronic SDH, frequent falls, anxiety, hypothyroidism and was admitted to [facility] on 1/2/2020 from home due to AMS with a diagnosis of SDH now s/p craniotomy for left frontal SDH. Her post-operative course has been complicated by fluctuating mentation, seizures, agitation/restlessness, and dysphagia/aspiration. She was receiving supplemental nutrition with dobhoff tube until she pulled it out. The family met with Palliative to discuss [address]. Risks and beneftis of g-tube were discussed, and family believed she would be agitated by this and would likely pull it as she did the dobhoff. The family decided to pursue comfort-focused care with hospice. At time of my exam pt is unresponsive with PPS 10%, significant restlessness, intermittent apnea, and has an estimated prognosis of hours to days. GIP level of care needed for progressive worsening symptoms of seizures, restlessness necessitating frequent skilled nursing assessment and administration of parenteral medications. Needs monitoring for need to titrate sx mgt regimen for optimization of comfort. Pt is at high risk of rapid decline and death due to terminal disease process. \" The patient's care was focused on comfort, and the patient was transferred to [facility] for ongoing GIP LOC on 1/16/2020.   
 
Fred Cobb MD

## 2020-02-12 NOTE — PROGRESS NOTES
Bedside shift change report given to Randee Atwood RN (oncoming nurse) by Joshua Toledo RN (offgoing nurse). Report included the following information SBAR, Kardex, MAR and Dual Neuro Assessment. Yes

## 2021-06-23 NOTE — PROGRESS NOTES
NIGHT SHIFT UPDATE:     [id number]: Maame Edmond with EEG on call paged. Will arrive to hospital as soon as he can. weight-bearing as tolerated

## 2022-10-16 NOTE — H&P
Patient was admitted to GIP LOC at [facility] on 1/15. She was transferred to [facility] for ongoing GIP LOC on 1/16. Please see initial H&P from 1/15 and subsequent progress notes for more details.     Kaylin Serna MD
Acuity of illness

## 2023-05-23 NOTE — PROCEDURES
[address]  EEG    Name:  Carolyn Berry  MR#:  [id number]  :  1955  ACCOUNT #:  [de-identified]  DATE OF SERVICE:  2020      REQUESTING PHYSICIAN:  Mellissa Urbina DO    HISTORY:  The patient is a 76-year-old female, who is being monitored on continuous video EEG monitoring for recurrent seizure-like activity. She has left frontal subdural hematoma, status post evacuation. DESCRIPTION:  This is a prolonged EEG video monitoring performed on 2020. The recording starts at 12:56 a.m. and continues until 09:22 a.m. on 2020. There is no clear obvious background rhythm. Most of the left frontal and right frontal/central electrodes appeared to be dislodged during entire part of this recording. There is a slowing seen in readable portions out of the left posterior temporal and right central head region. The patient was noted to be quite restless with some twitching seen in the right lower extremity between 01:20 a.m. and 01:40 a.m., but the readable electrodes did not show any seizure activity. EEG SUMMARY:  Technically limited study with readable portions showing slow background rhythms. CLINICAL INTERPRETATION:  This EEG was very limited due to dislodgement of several electrodes. The background continues to indicate generalized encephalopathic process. The patient was noted to be restless in the early hours of the morning around 01:30 a.m., but readable portions do not convincingly show a seizure activity.         Chelsey Kohli MD      [id number]  D:  2020 10:44  T:  2020 11:45  JOB #:  [id number] Received request via: Pharmacy    Was the patient seen in the last year in this department? Yes    Does the patient have an active prescription (recently filled or refills available) for medication(s) requested? No    Does the patient have custodial Plus and need 100 day supply (blood pressure, diabetes and cholesterol meds only)? Patient does not have SCP

## 2025-03-28 NOTE — PROGRESS NOTES
Bed: B04  Expected date: 3/27/25  Expected time: 11:28 PM  Means of arrival: [facility] Dept (03)  Comments:  65M SOB x 1 day  65% RA  120/84, 130, 20  16 dex, duoneb x2   [facility]    ICU TEAM Progress Note    Name: Birgit Blanton   : 1955   MRN: [id number]   Date: 2020      Subjective:   Progress Note: 2020    CC: Altered mental status  Reason for ICU Admission:   Chronic SDH  Craniotomy for drain SHD  Down Syndrome  Anxiety    Overnight Events: Somnolent this AM, did not receive any benzodiazepines overnight, had improvement in mental status towards the end of  evening, moves all extremities    S/P: Procedure(s):  CRANIOTOMY LEFT FRONTAL FORSUBDURAL HEMATOMA    Active Problem List:     Problem List  Date Reviewed: 2020          Codes Class    Seizures ([facility] ICD-10-CM: R56.9  ICD-9-CM: 780.39         * (Principal) SDH (subdural hematoma) ([facility] ICD-10-CM: M73.6T7Y  ICD-9-CM: 432.1         Down's syndrome (Chronic) ICD-10-CM: Q90.9  ICD-9-CM: 758.0         Acquired hypothyroidism (Chronic) ICD-10-CM: E03.9  ICD-9-CM: 244.9               Past Medical History:      has a past medical history of Endocrine disease and Psychiatric disorder. Past Surgical History:      has a past surgical history that includes hx cholecystectomy.     Medications:     Current Facility-Administered Medications   Medication Dose Route Frequency    potassium chloride 10 mEq in 50 ml IVPB  10 mEq IntraVENous Q1H    levETIRAcetam (KEPPRA) 1,500 mg in 0.9% sodium chloride 100 mL IVPB  1,500 mg IntraVENous Q12H    acetaminophen (TYLENOL) suppository 650 mg  650 mg Rectal Q4H PRN    [START ON 2020] levothyroxine (SYNTHROID) injection 56.25 mcg  56.25 mcg IntraVENous Q24H    LORazepam (ATIVAN) injection 2 mg  2 mg IntraVENous Q1H PRN    dextrose 10 % infusion 125-250 mL  125-250 mL IntraVENous PRN    famotidine (PF) (PEPCID) 20 mg in 0.9% sodium chloride 10 mL injection  20 mg IntraVENous Q12H    miconazole (MICOTIN) 2 % powder   Topical BID    lacosamide (VIMPAT) 100 mg in 0.9% sodium chloride 100 mL IVPB  100 mg IntraVENous Q12H    sodium chloride (NS) flush 5-40 mL 5-40 mL IntraVENous Q8H    sodium chloride (NS) flush 5-40 mL  5-40 mL IntraVENous PRN    0.9% sodium chloride infusion  75 mL/hr IntraVENous CONTINUOUS    acetaminophen (TYLENOL) solution 650 mg  650 mg Oral Q4H PRN    HYDROcodone-acetaminophen (NORCO) 5-325 mg per tablet 1 Tab  1 Tab Oral Q4H PRN    ondansetron (ZOFRAN) injection 4 mg  4 mg IntraVENous Q4H PRN    niCARdipine (CARDENE) 25 mg in 0.9% sodium chloride 250 mL infusion  5-15 mg/hr IntraVENous TITRATE    labetalol (NORMODYNE;TRANDATE) injection 10 mg  10 mg IntraVENous Q15MIN PRN    Or    hydrALAZINE (APRESOLINE) 20 mg/mL injection 10 mg  10 mg IntraVENous Q15MIN PRN    fentaNYL citrate (PF) injection 25-50 mcg  25-50 mcg IntraVENous Q1H PRN    sodium chloride (NS) flush 5-40 mL  5-40 mL IntraVENous Q8H    sodium chloride (NS) flush 5-40 mL  5-40 mL IntraVENous PRN    naloxone (NARCAN) injection 0.4 mg  0.4 mg IntraVENous PRN       Allergies/Social/Family History:     No Known Allergies   Social History     Tobacco Use    Smoking status: Never Smoker    Smokeless tobacco: Never Used   Substance Use Topics    Alcohol use: No      History reviewed. No pertinent family history. Review of Systems:     Review of systems not obtained due to patient factors.     Objective:   Vital Signs:  Visit Vitals  /53   Pulse 76   Temp 98.9 °F (37.2 °C)   Resp 12   Ht 4' 5\" (1.346 m)   Wt 62 kg (136 lb 11 oz)   SpO2 98%   BMI 34.21 kg/m²    O2 Flow Rate (L/min): 2 l/min O2 Device: Nasal cannula Temp (24hrs), Av.9 °F (37.2 °C), Min:98.2 °F (36.8 °C), Max:100.4 °F (38 °C)           Intake/Output:     Intake/Output Summary (Last 24 hours) at 2020 0749  Last data filed at 2020 0700  Gross per 24 hour   Intake 800 ml   Output 1250 ml   Net -450 ml       Physical Exam:    General:  female, Down Syndrome features  Eye:  conjunctivae/corneas clear  Neurologic:  Somnolent, does not open eyes, does not follow commands  Neck:  normal and neck supple and symmetrical.     Lungs:  clear to auscultation bilaterally, diminished breath sounds R base, L base  Heart:  RR, S1, S2 normal  Abdomen:  soft, non-tender  Skin:  Warm, dry     LABS AND  DATA: Personally reviewed  Recent Labs     01/06/20  0216 01/05/20  0432   WBC 8.2 6.8   HGB 12.9 14.6   HCT 40.0 47.0    192     Recent Labs     01/07/20  0429 01/06/20  0216 01/05/20  0021    137 138   K 3.3* 3.2* 3.6    105 103   CO2 29 25 28   BUN 7 7 9   CREA 0.50* 0.49* 0.74   GLU 92 80 83   CA 8.3* 7.4* 8.0*   MG  --   --  1.7     Recent Labs     01/05/20  0021   SGOT 21   AP 83   TP 6.4   ALB 2.5*   GLOB 3.9     No results for input(s): INR, PTP, APTT, INREXT, INREXT in the last 72 hours. No results for input(s): PHI, PCO2I, PO2I, FIO2I in the last 72 hours. No results for input(s): CPK, CKMB, TROIQ, BNPP in the last 72 hours. Hemodynamics:   PAP:   CO:     Wedge:   CI:     CVP:    SVR:       PVR:       Ventilator Settings:  Mode Rate Tidal Volume Pressure FiO2 PEEP                    Peak airway pressure:      Minute ventilation:          MEDS: Reviewed    CT head: personally reviewed and report checked      Assessment:     ICU Problems:  - Chronic SDH  - Tonic clonic Seizure  - Craniotomy for SDH decompression  - Down Syndrome  - Anxiety    ICU Comprehensive Plan of Care:   Plans for this Shift:   1. Continue q2h Neuro checks, continue to have SDH of mixed chronicity   2. Monitor neuro status, neurology consulted for seizure activity  3. 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 Electrolyte replacement   5. PT/OT/Speech tx  6. Hold all anxiolytics  7. Continue antiepileptics, if continue to observe seizure activity, will increase antiepileptic dosing  8. RT for pulmonary toileting, ante suction, CPT    Multidisciplinary Rounds Completed:   Yes    ABCDEF Bundle/Checklist  Pain Medications: Acetaminophen  Target RASS: N/A  Sedation Medications: None  CAM-ICU:  unable to assess  Mobility: Poor  PT/OT: PT consulted and on board, OT consulted and on board and Speech therapy consulted and on board   Restraints: Soft wrist restraints  Discussed Plan of Care (goals of care): Yes  Addressed Code Status: Full Code    CARDIOVASCULAR  Cardiac Gtts: None  SBP Goal of: < 140 mmHg  MAP Goal of: < 90 mmHg  Transfusion Trigger (Hgb): <7 g/dL    RESPIRATORY  Vent Goals:   Aggressive bronchopulmonary hygiene  DVT Prophylaxis (if no, list reason): SCD's or Sequential Compression Device   SPO2 Goal: > 92%  Pulmonary toilet: as best as possible     GI/  [place] Catheter Present: No  GI Prophylaxis: Pepcid (famotidine)   Nutrition: NGT placement    IVFs: discontinued  Bowel Movement: Yes  Bowel Regimen: MiraLax  Insulin:  Sliding scale PRN    ANTIBIOTICS  Antibiotics:  none    T/L/D  Tubes: Nasogastric Tube  Lines: Peripheral IV  Drains: None    SPECIAL EQUIPMENT  None    DISPOSITION  Stay in ICU    CRITICAL CARE CONSULTANT NOTE  I had a face to face encounter with the patient, reviewed and interpreted patient data including clinical events, labs, images, vital signs, I/O's, and examined patient. I have discussed the case and the plan and management of the patient's care with the consulting services, the bedside nurses and the respiratory therapist.    NOTE OF PERSONAL INVOLVEMENT IN CARE   This patient has a high probability of imminent, clinically significant deterioration, which requires the highest level of preparedness to intervene urgently. I participated in the decision-making and personally managed or directed the management of the following life and organ supporting interventions that required my frequent assessment to treat or prevent imminent deterioration. I personally spent 32 minutes of critical care time.  This is time spent at this critically ill patient's bedside actively involved in patient care as well as the coordination of care and discussions with the patient's family. This does not include any procedural time which has been billed separately.     Geneva Carlson MD  1/7/2020   3:17 PM

## (undated) DEVICE — STRAP,POSITIONING,KNEE/BODY,FOAM,4X60": Brand: MEDLINE

## (undated) DEVICE — INFECTION CONTROL KIT SYS

## (undated) DEVICE — HANDLE LT SNAP ON ULT DURABLE LENS FOR TRUMPF ALC DISPOSABLE

## (undated) DEVICE — SOLUTION IV 250ML 0.9% SOD CHL CLR INJ FLX BG CONT PRT CLSR

## (undated) DEVICE — STERILE POLYISOPRENE POWDER-FREE SURGICAL GLOVES: Brand: PROTEXIS

## (undated) DEVICE — TOOL 8TA11 LEGEND 8CM 1.1MM TA: Brand: MIDAS REX ™

## (undated) DEVICE — SPONGE GZ W4XL4IN COT 12 PLY TYP VII WVN C FLD DSGN

## (undated) DEVICE — GARMENT,MEDLINE,DVT,INT,CALF,MED, GEN2: Brand: MEDLINE

## (undated) DEVICE — TOOL 8TD126 LEGEND 8CM 1.2MM 6MM DEPTH: Brand: MIDAS REX ™

## (undated) DEVICE — AGENT HEMSTAT W2XL14IN OXIDIZED REGENERATED CELOS ABSRB FOR

## (undated) DEVICE — SURGICAL PROCEDURE KIT CRANIOTOMY

## (undated) DEVICE — SUTURE VCRL SZ 2-0 L18IN ABSRB UD L26MM CP-2 1/2 CIR REV J762D

## (undated) DEVICE — SOLUTION IV 1000ML 0.9% SOD CHL

## (undated) DEVICE — CANISTER, RIGID, 3000CC: Brand: MEDLINE INDUSTRIES, INC.

## (undated) DEVICE — RUBBERBAND FASTENING W0.25XL3.5IN 5 PER PK

## (undated) DEVICE — DRAIN SURG FLAT W7MMXL20CM FULL PERF

## (undated) DEVICE — CATH URETH FOL 2W SH 12FRX5ML -- CONVERT TO ITEM 363070

## (undated) DEVICE — KIT CG8901 CLIP GUN 10 PK: Brand: CLIP GUN

## (undated) DEVICE — DRAPE FLD WRM W44XL66IN C6L FOR INTRATEMP SYS THERMABASIN

## (undated) DEVICE — STOCKINETTE,DOUBLE PLY,6X54,STERILE: Brand: MEDLINE

## (undated) DEVICE — SUTURE NRLN SZ 4-0 L18IN NONABSORBABLE BLK L13MM TF 1/2 CIR C584D

## (undated) DEVICE — FLOSEAL HEMOSTATIC MATRIX, 10ML: Brand: FLOSEAL HEMOSTATIC MATRIX

## (undated) DEVICE — SUTURE VCRL SZ 0 L18IN ABSRB VLT L36MM CT-1 1/2 CIR J740D

## (undated) DEVICE — RESERVOIR,SUCTION,100CC,SILICONE: Brand: MEDLINE

## (undated) DEVICE — TOOL F2/8TA23 LEGEND 8CM 2.3MM TAPER: Brand: MIDAS REX™

## (undated) DEVICE — REM POLYHESIVE ADULT PATIENT RETURN ELECTRODE: Brand: VALLEYLAB